# Patient Record
Sex: MALE | Race: WHITE | Employment: FULL TIME | URBAN - METROPOLITAN AREA
[De-identification: names, ages, dates, MRNs, and addresses within clinical notes are randomized per-mention and may not be internally consistent; named-entity substitution may affect disease eponyms.]

---

## 2017-07-17 ENCOUNTER — OFFICE VISIT (OUTPATIENT)
Dept: FAMILY MEDICINE CLINIC | Age: 27
End: 2017-07-17

## 2017-07-17 VITALS
WEIGHT: 209 LBS | RESPIRATION RATE: 18 BRPM | SYSTOLIC BLOOD PRESSURE: 132 MMHG | TEMPERATURE: 98.1 F | DIASTOLIC BLOOD PRESSURE: 84 MMHG | HEIGHT: 66 IN | BODY MASS INDEX: 33.59 KG/M2 | OXYGEN SATURATION: 98 % | HEART RATE: 84 BPM

## 2017-07-17 DIAGNOSIS — F41.9 ANXIETY: Primary | ICD-10-CM

## 2017-07-17 PROCEDURE — 99213 OFFICE O/P EST LOW 20 MIN: CPT | Performed by: FAMILY MEDICINE

## 2017-07-17 RX ORDER — SERTRALINE HYDROCHLORIDE 100 MG/1
200 TABLET, FILM COATED ORAL DAILY
Qty: 180 TABLET | Refills: 3 | Status: SHIPPED | OUTPATIENT
Start: 2017-07-17 | End: 2018-02-17 | Stop reason: CLARIF

## 2017-07-17 ASSESSMENT — ENCOUNTER SYMPTOMS
ALLERGIC/IMMUNOLOGIC NEGATIVE: 1
GASTROINTESTINAL NEGATIVE: 1
SHORTNESS OF BREATH: 0
RESPIRATORY NEGATIVE: 1
EYES NEGATIVE: 1

## 2017-10-10 ENCOUNTER — OFFICE VISIT (OUTPATIENT)
Dept: FAMILY MEDICINE CLINIC | Age: 27
End: 2017-10-10

## 2017-10-10 VITALS
DIASTOLIC BLOOD PRESSURE: 88 MMHG | SYSTOLIC BLOOD PRESSURE: 156 MMHG | RESPIRATION RATE: 18 BRPM | TEMPERATURE: 98.1 F | WEIGHT: 223 LBS | BODY MASS INDEX: 35.84 KG/M2 | OXYGEN SATURATION: 97 % | HEART RATE: 84 BPM | HEIGHT: 66 IN

## 2017-10-10 DIAGNOSIS — I10 ESSENTIAL HYPERTENSION: ICD-10-CM

## 2017-10-10 DIAGNOSIS — G43.009 NONINTRACTABLE MIGRAINE, UNSPECIFIED MIGRAINE TYPE: Primary | ICD-10-CM

## 2017-10-10 PROCEDURE — 99213 OFFICE O/P EST LOW 20 MIN: CPT | Performed by: FAMILY MEDICINE

## 2017-10-10 RX ORDER — SUMATRIPTAN 100 MG/1
100 TABLET, FILM COATED ORAL
Qty: 9 TABLET | Refills: 3 | Status: SHIPPED | OUTPATIENT
Start: 2017-10-10 | End: 2018-04-24 | Stop reason: ALTCHOICE

## 2017-10-10 ASSESSMENT — PATIENT HEALTH QUESTIONNAIRE - PHQ9
1. LITTLE INTEREST OR PLEASURE IN DOING THINGS: 0
SUM OF ALL RESPONSES TO PHQ9 QUESTIONS 1 & 2: 0
2. FEELING DOWN, DEPRESSED OR HOPELESS: 0
SUM OF ALL RESPONSES TO PHQ QUESTIONS 1-9: 0

## 2017-10-10 ASSESSMENT — ENCOUNTER SYMPTOMS
SHORTNESS OF BREATH: 0
RESPIRATORY NEGATIVE: 1
EYES NEGATIVE: 1
GASTROINTESTINAL NEGATIVE: 1
ALLERGIC/IMMUNOLOGIC NEGATIVE: 1

## 2017-10-10 NOTE — PROGRESS NOTES
Patient is seen in follow up for   Chief Complaint   Patient presents with    Migraine     chronic but getting worse x 1 mon     HPIhere with chronic migraines worse the last month. bp is also elevated ct negative last month. Past Medical History:   Diagnosis Date    Depression     Essential hypertension 10/10/2017    Jejunal intussusception ([address]     Nonintractable migraine 10/10/2017     Patient Active Problem List    Diagnosis Date Noted    Essential hypertension 10/10/2017    Nonintractable migraine 10/10/2017    Anxiety 07/17/2017     No past surgical history on file. Family History   Problem Relation Age of Onset    High Blood Pressure Father     Mental Illness Father     Other Father      gout     Social History     Social History    Marital status: Single     Spouse name: N/A    Number of children: N/A    Years of education: N/A     Social History Main Topics    Smoking status: Current Every Day Smoker     Packs/day: 0.50     Years: 5.00    Smokeless tobacco: Never Used    Alcohol use No    Drug use: Unknown    Sexual activity: Not Asked     Other Topics Concern    None     Social History Narrative    None     Current Outpatient Prescriptions   Medication Sig Dispense Refill    verapamil (CALAN SR) 180 MG extended release tablet Take 1 tablet by mouth daily 30 tablet 3    SUMAtriptan (IMITREX) 100 MG tablet Take 1 tablet by mouth once as needed for Migraine 9 tablet 3    sertraline (ZOLOFT) 50 MG tablet 50 mg See Admin Instructions Take 3 tablets every morning      sertraline (ZOLOFT) 100 MG tablet Take 2 tablets by mouth daily 180 tablet 3    LORazepam (ATIVAN) 1 MG tablet Take 1 mg by mouth every 6 hours as needed for Anxiety       No current facility-administered medications for this visit.       Current Outpatient Prescriptions on File Prior to Visit   Medication Sig Dispense Refill    sertraline (ZOLOFT) 50 MG tablet 50 mg See Admin Instructions Take 3 tablets every morning  sertraline (ZOLOFT) 100 MG tablet Take 2 tablets by mouth daily 180 tablet 3    LORazepam (ATIVAN) 1 MG tablet Take 1 mg by mouth every 6 hours as needed for Anxiety       No current facility-administered medications on file prior to visit. Allergies   Allergen Reactions    Amoxil [Amoxicillin]      unknow to pt happens happens when he takes it     Health Maintenance   Topic Date Due    HIV screen  06/15/2005    Pneumococcal med risk (1 of 1 - PPSV23) 06/15/2009    Flu vaccine (1) 09/01/2017    DTaP/Tdap/Td vaccine (2 - Td) 01/01/2022       Review of Systems    Review of Systems   Constitutional: Negative for activity change, appetite change, chills, fever and unexpected weight change. HENT: Negative. Eyes: Negative. Respiratory: Negative. Negative for shortness of breath. Cardiovascular: Negative. Negative for chest pain and palpitations. Gastrointestinal: Negative. Endocrine: Negative. Genitourinary: Negative. Musculoskeletal: Negative. Skin: Negative. Allergic/Immunologic: Negative. Neurological: Negative. Hematological: Negative. Psychiatric/Behavioral: Negative. Physical Exam  Vitals:    10/10/17 1415 10/10/17 1424   BP: (!) 156/86 (!) 156/88   Pulse: 84    Resp: 18    Temp: 98.1 °F (36.7 °C)    TempSrc: Tympanic    SpO2: 97%    Weight: 223 lb (101.2 kg)    Height: 5' 6\" (1.676 m)        Physical Exam   Constitutional: He appears well-developed and well-nourished. HENT:   Right Ear: External ear normal.   Left Ear: External ear normal.   Eyes: Conjunctivae are normal. Pupils are equal, round, and reactive to light. Neck: Normal range of motion. Neck supple. No thyromegaly present. Cardiovascular: Normal rate, regular rhythm and normal heart sounds. No murmur heard. Pulmonary/Chest: Effort normal and breath sounds normal.   Abdominal: Soft. Bowel sounds are normal.   Musculoskeletal: Normal range of motion.  He exhibits no edema or tenderness. Lymphadenopathy:     He has no cervical adenopathy. Assessment  1. Nonintractable migraine, unspecified migraine type     2. Essential hypertension       Problem List     Essential hypertension    Relevant Medications    verapamil (CALAN SR) 180 MG extended release tablet    Nonintractable migraine - Primary    Relevant Medications    sertraline (ZOLOFT) 50 MG tablet    sertraline (ZOLOFT) 100 MG tablet    verapamil (CALAN SR) 180 MG extended release tablet    SUMAtriptan (IMITREX) 100 MG tablet          Plan  No orders of the defined types were placed in this encounter. Orders Placed This Encounter   Medications    verapamil (CALAN SR) 180 MG extended release tablet     Sig: Take 1 tablet by mouth daily     Dispense:  30 tablet     Refill:  3    SUMAtriptan (IMITREX) 100 MG tablet     Sig: Take 1 tablet by mouth once as needed for Migraine     Dispense:  9 tablet     Refill:  3     No Follow-up on file.   Tyra Breaux MD

## 2017-11-30 ENCOUNTER — OFFICE VISIT (OUTPATIENT)
Dept: FAMILY MEDICINE CLINIC | Age: 27
End: 2017-11-30

## 2017-11-30 VITALS
RESPIRATION RATE: 18 BRPM | DIASTOLIC BLOOD PRESSURE: 82 MMHG | HEART RATE: 74 BPM | WEIGHT: 227 LBS | TEMPERATURE: 97.2 F | OXYGEN SATURATION: 97 % | BODY MASS INDEX: 36.48 KG/M2 | SYSTOLIC BLOOD PRESSURE: 130 MMHG | HEIGHT: 66 IN

## 2017-11-30 DIAGNOSIS — F41.9 ANXIETY: ICD-10-CM

## 2017-11-30 DIAGNOSIS — I10 ESSENTIAL HYPERTENSION: Primary | ICD-10-CM

## 2017-11-30 PROCEDURE — G8484 FLU IMMUNIZE NO ADMIN: HCPCS | Performed by: FAMILY MEDICINE

## 2017-11-30 PROCEDURE — G8417 CALC BMI ABV UP PARAM F/U: HCPCS | Performed by: FAMILY MEDICINE

## 2017-11-30 PROCEDURE — 4004F PT TOBACCO SCREEN RCVD TLK: CPT | Performed by: FAMILY MEDICINE

## 2017-11-30 PROCEDURE — G8427 DOCREV CUR MEDS BY ELIG CLIN: HCPCS | Performed by: FAMILY MEDICINE

## 2017-11-30 PROCEDURE — 99213 OFFICE O/P EST LOW 20 MIN: CPT | Performed by: FAMILY MEDICINE

## 2017-11-30 RX ORDER — ALPRAZOLAM 0.25 MG/1
0.25 TABLET ORAL 3 TIMES DAILY PRN
Qty: 60 TABLET | Refills: 1 | Status: SHIPPED | OUTPATIENT
Start: 2017-11-30 | End: 2018-03-09 | Stop reason: SDUPTHER

## 2017-11-30 ASSESSMENT — ENCOUNTER SYMPTOMS
ALLERGIC/IMMUNOLOGIC NEGATIVE: 1
EYES NEGATIVE: 1
RESPIRATORY NEGATIVE: 1
SHORTNESS OF BREATH: 0
GASTROINTESTINAL NEGATIVE: 1

## 2018-01-11 ENCOUNTER — OFFICE VISIT (OUTPATIENT)
Dept: FAMILY MEDICINE CLINIC | Age: 28
End: 2018-01-11

## 2018-01-11 VITALS
HEART RATE: 98 BPM | HEIGHT: 66 IN | TEMPERATURE: 98 F | SYSTOLIC BLOOD PRESSURE: 154 MMHG | WEIGHT: 230 LBS | DIASTOLIC BLOOD PRESSURE: 96 MMHG | BODY MASS INDEX: 36.96 KG/M2 | RESPIRATION RATE: 18 BRPM | OXYGEN SATURATION: 97 %

## 2018-01-11 DIAGNOSIS — F41.9 ANXIETY: ICD-10-CM

## 2018-01-11 DIAGNOSIS — I10 ESSENTIAL HYPERTENSION: Primary | ICD-10-CM

## 2018-01-11 PROCEDURE — G8427 DOCREV CUR MEDS BY ELIG CLIN: HCPCS | Performed by: FAMILY MEDICINE

## 2018-01-11 PROCEDURE — G8484 FLU IMMUNIZE NO ADMIN: HCPCS | Performed by: FAMILY MEDICINE

## 2018-01-11 PROCEDURE — 99213 OFFICE O/P EST LOW 20 MIN: CPT | Performed by: FAMILY MEDICINE

## 2018-01-11 PROCEDURE — G8417 CALC BMI ABV UP PARAM F/U: HCPCS | Performed by: FAMILY MEDICINE

## 2018-01-11 PROCEDURE — 4004F PT TOBACCO SCREEN RCVD TLK: CPT | Performed by: FAMILY MEDICINE

## 2018-01-11 RX ORDER — QUETIAPINE FUMARATE 100 MG/1
100 TABLET, FILM COATED ORAL NIGHTLY
Qty: 60 TABLET | Refills: 3 | Status: SHIPPED | OUTPATIENT
Start: 2018-01-11 | End: 2018-02-17 | Stop reason: CLARIF

## 2018-01-11 ASSESSMENT — ENCOUNTER SYMPTOMS
RESPIRATORY NEGATIVE: 1
SHORTNESS OF BREATH: 0
ALLERGIC/IMMUNOLOGIC NEGATIVE: 1
EYES NEGATIVE: 1
GASTROINTESTINAL NEGATIVE: 1

## 2018-01-11 NOTE — PROGRESS NOTES
Bowel sounds are normal.   Musculoskeletal: Normal range of motion. He exhibits no edema or tenderness. Lymphadenopathy:     He has no cervical adenopathy. Assessment  1. Essential hypertension     2. Anxiety       Problem List     Anxiety    Relevant Medications    LORazepam (ATIVAN) injection 0.5 mg (Completed)    sertraline (ZOLOFT) 100 MG tablet    Essential hypertension - Primary    Relevant Medications    verapamil (CALAN SR) 180 MG extended release tablet          Plan  No orders of the defined types were placed in this encounter. Orders Placed This Encounter   Medications    verapamil (CALAN SR) 180 MG extended release tablet     Sig: Take 1 tablet by mouth 2 times daily     Dispense:  60 tablet     Refill:  3    QUEtiapine (SEROQUEL) 100 MG tablet     Sig: Take 1 tablet by mouth nightly     Dispense:  60 tablet     Refill:  3     No Follow-up on file.   Jeaneth Wynn MD

## 2018-01-27 ENCOUNTER — OFFICE VISIT (OUTPATIENT)
Dept: FAMILY MEDICINE CLINIC | Age: 28
End: 2018-01-27
Payer: COMMERCIAL

## 2018-01-27 VITALS
HEART RATE: 70 BPM | WEIGHT: 237 LBS | DIASTOLIC BLOOD PRESSURE: 110 MMHG | TEMPERATURE: 98.1 F | SYSTOLIC BLOOD PRESSURE: 160 MMHG | HEIGHT: 66 IN | BODY MASS INDEX: 38.09 KG/M2 | RESPIRATION RATE: 16 BRPM

## 2018-01-27 DIAGNOSIS — G43.909 MIGRAINE WITHOUT STATUS MIGRAINOSUS, NOT INTRACTABLE, UNSPECIFIED MIGRAINE TYPE: ICD-10-CM

## 2018-01-27 DIAGNOSIS — I10 ESSENTIAL HYPERTENSION: Primary | ICD-10-CM

## 2018-01-27 PROCEDURE — G8417 CALC BMI ABV UP PARAM F/U: HCPCS | Performed by: FAMILY MEDICINE

## 2018-01-27 PROCEDURE — 99213 OFFICE O/P EST LOW 20 MIN: CPT | Performed by: FAMILY MEDICINE

## 2018-01-27 PROCEDURE — 4004F PT TOBACCO SCREEN RCVD TLK: CPT | Performed by: FAMILY MEDICINE

## 2018-01-27 PROCEDURE — G8484 FLU IMMUNIZE NO ADMIN: HCPCS | Performed by: FAMILY MEDICINE

## 2018-01-27 PROCEDURE — G8427 DOCREV CUR MEDS BY ELIG CLIN: HCPCS | Performed by: FAMILY MEDICINE

## 2018-01-27 RX ORDER — VERAPAMIL HYDROCHLORIDE 240 MG/1
240 TABLET, FILM COATED, EXTENDED RELEASE ORAL DAILY
Qty: 90 TABLET | Refills: 3 | Status: SHIPPED | OUTPATIENT
Start: 2018-01-27 | End: 2018-09-25 | Stop reason: SDUPTHER

## 2018-01-27 RX ORDER — BLOOD PRESSURE TEST KIT-LARGE
1 KIT MISCELLANEOUS DAILY
Qty: 1 DEVICE | Refills: 0 | Status: SHIPPED | OUTPATIENT
Start: 2018-01-27 | End: 2018-09-25

## 2018-01-27 ASSESSMENT — ENCOUNTER SYMPTOMS
SHORTNESS OF BREATH: 0
RESPIRATORY NEGATIVE: 1
EYES NEGATIVE: 1
ALLERGIC/IMMUNOLOGIC NEGATIVE: 1
GASTROINTESTINAL NEGATIVE: 1

## 2018-01-27 NOTE — PROGRESS NOTES
Patient is seen in follow up for   Chief Complaint   Patient presents with    Medication Check     bp     HPI here for follow up on htn he had some nausea with the calan. Past Medical History:   Diagnosis Date    Depression     Essential hypertension 10/10/2017    Jejunal intussusception ([address]     Nonintractable migraine 10/10/2017     Patient Active Problem List    Diagnosis Date Noted    Essential hypertension 10/10/2017    Nonintractable migraine 10/10/2017    Anxiety 07/17/2017     No past surgical history on file. Family History   Problem Relation Age of Onset    High Blood Pressure Father     Mental Illness Father     Other Father      gout     Social History     Social History    Marital status: Single     Spouse name: N/A    Number of children: N/A    Years of education: N/A     Social History Main Topics    Smoking status: Current Every Day Smoker     Packs/day: 0.50     Years: 5.00    Smokeless tobacco: Never Used    Alcohol use No    Drug use: Unknown    Sexual activity: Not Asked     Other Topics Concern    None     Social History Narrative    None     Current Outpatient Prescriptions   Medication Sig Dispense Refill    verapamil (CALAN SR) 240 MG extended release tablet Take 1 tablet by mouth daily 90 tablet 3    Blood Pressure Monitoring (5 SERIES BP MONITOR) NESTOR 1 Device by Does not apply route Daily 1 Device 0    QUEtiapine (SEROQUEL) 100 MG tablet Take 1 tablet by mouth nightly 60 tablet 3    SUMAtriptan (IMITREX) 100 MG tablet Take 1 tablet by mouth once as needed for Migraine 9 tablet 3    sertraline (ZOLOFT) 100 MG tablet Take 2 tablets by mouth daily 180 tablet 3     No current facility-administered medications for this visit.       Current Outpatient Prescriptions on File Prior to Visit   Medication Sig Dispense Refill    QUEtiapine (SEROQUEL) 100 MG tablet Take 1 tablet by mouth nightly 60 tablet 3    SUMAtriptan (IMITREX) 100 MG tablet Take 1 tablet by mouth

## 2018-02-17 ENCOUNTER — OFFICE VISIT (OUTPATIENT)
Dept: FAMILY MEDICINE CLINIC | Age: 28
End: 2018-02-17
Payer: COMMERCIAL

## 2018-02-17 VITALS
WEIGHT: 240 LBS | BODY MASS INDEX: 38.57 KG/M2 | RESPIRATION RATE: 15 BRPM | DIASTOLIC BLOOD PRESSURE: 84 MMHG | HEART RATE: 86 BPM | HEIGHT: 66 IN | SYSTOLIC BLOOD PRESSURE: 136 MMHG | TEMPERATURE: 98.1 F

## 2018-02-17 DIAGNOSIS — I10 ESSENTIAL HYPERTENSION: Primary | ICD-10-CM

## 2018-02-17 DIAGNOSIS — F41.9 ANXIETY: ICD-10-CM

## 2018-02-17 PROCEDURE — G8417 CALC BMI ABV UP PARAM F/U: HCPCS | Performed by: FAMILY MEDICINE

## 2018-02-17 PROCEDURE — 99213 OFFICE O/P EST LOW 20 MIN: CPT | Performed by: FAMILY MEDICINE

## 2018-02-17 PROCEDURE — 4004F PT TOBACCO SCREEN RCVD TLK: CPT | Performed by: FAMILY MEDICINE

## 2018-02-17 PROCEDURE — G8427 DOCREV CUR MEDS BY ELIG CLIN: HCPCS | Performed by: FAMILY MEDICINE

## 2018-02-17 PROCEDURE — G8484 FLU IMMUNIZE NO ADMIN: HCPCS | Performed by: FAMILY MEDICINE

## 2018-02-17 RX ORDER — QUETIAPINE FUMARATE 25 MG/1
25 TABLET, FILM COATED ORAL NIGHTLY
Qty: 60 TABLET | Refills: 3 | Status: SHIPPED | OUTPATIENT
Start: 2018-02-17 | End: 2018-09-25

## 2018-02-17 ASSESSMENT — ENCOUNTER SYMPTOMS
SHORTNESS OF BREATH: 0
ALLERGIC/IMMUNOLOGIC NEGATIVE: 1
RESPIRATORY NEGATIVE: 1
EYES NEGATIVE: 1
GASTROINTESTINAL NEGATIVE: 1

## 2018-02-17 NOTE — PROGRESS NOTES
Patient is seen in follow up for   Chief Complaint   Patient presents with    Hypertension     HPIhere for follow up on anxiety to sedated with the seroquel    Past Medical History:   Diagnosis Date    Depression     Essential hypertension 10/10/2017    Jejunal intussusception ([address]     Nonintractable migraine 10/10/2017     Patient Active Problem List    Diagnosis Date Noted    Essential hypertension 10/10/2017    Nonintractable migraine 10/10/2017    Anxiety 07/17/2017     No past surgical history on file. Family History   Problem Relation Age of Onset    High Blood Pressure Father     Mental Illness Father     Other Father      gout     Social History     Social History    Marital status: Single     Spouse name: N/A    Number of children: N/A    Years of education: N/A     Social History Main Topics    Smoking status: Current Every Day Smoker     Packs/day: 0.50     Years: 5.00    Smokeless tobacco: Never Used    Alcohol use No    Drug use: Unknown    Sexual activity: Not Asked     Other Topics Concern    None     Social History Narrative    None     Current Outpatient Prescriptions   Medication Sig Dispense Refill    QUEtiapine (SEROQUEL) 25 MG tablet Take 1 tablet by mouth nightly 60 tablet 3    verapamil (CALAN SR) 240 MG extended release tablet Take 1 tablet by mouth daily 90 tablet 3    Blood Pressure Monitoring (5 SERIES BP MONITOR) NESTOR 1 Device by Does not apply route Daily 1 Device 0    SUMAtriptan (IMITREX) 100 MG tablet Take 1 tablet by mouth once as needed for Migraine 9 tablet 3     No current facility-administered medications for this visit.       Current Outpatient Prescriptions on File Prior to Visit   Medication Sig Dispense Refill    verapamil (CALAN SR) 240 MG extended release tablet Take 1 tablet by mouth daily 90 tablet 3    Blood Pressure Monitoring (5 SERIES BP MONITOR) NESTOR 1 Device by Does not apply route Daily 1 Device 0    SUMAtriptan (IMITREX) 100 MG tablet He has no cervical adenopathy. Assessment  1. Essential hypertension     2. Anxiety       Problem List     Anxiety    Relevant Medications    LORazepam (ATIVAN) injection 0.5 mg (Completed)    Essential hypertension - Primary    Relevant Medications    verapamil (CALAN SR) 240 MG extended release tablet          Plan  No orders of the defined types were placed in this encounter.     Orders Placed This Encounter   Medications    QUEtiapine (SEROQUEL) 25 MG tablet     Sig: Take 1 tablet by mouth nightly     Dispense:  60 tablet     Refill:  3   follow up in three weeks will wean off zoloft and follow up and may add wellbutrin  Saleem Slade MD

## 2018-03-09 ENCOUNTER — OFFICE VISIT (OUTPATIENT)
Dept: FAMILY MEDICINE CLINIC | Age: 28
End: 2018-03-09
Payer: COMMERCIAL

## 2018-03-09 VITALS
HEIGHT: 66 IN | BODY MASS INDEX: 38.57 KG/M2 | SYSTOLIC BLOOD PRESSURE: 130 MMHG | HEART RATE: 74 BPM | OXYGEN SATURATION: 97 % | RESPIRATION RATE: 18 BRPM | DIASTOLIC BLOOD PRESSURE: 88 MMHG | WEIGHT: 240 LBS | TEMPERATURE: 98.2 F

## 2018-03-09 DIAGNOSIS — G43.909 MIGRAINE WITHOUT STATUS MIGRAINOSUS, NOT INTRACTABLE, UNSPECIFIED MIGRAINE TYPE: ICD-10-CM

## 2018-03-09 DIAGNOSIS — I10 ESSENTIAL HYPERTENSION: Primary | ICD-10-CM

## 2018-03-09 DIAGNOSIS — F41.9 ANXIETY: ICD-10-CM

## 2018-03-09 PROCEDURE — G8417 CALC BMI ABV UP PARAM F/U: HCPCS | Performed by: FAMILY MEDICINE

## 2018-03-09 PROCEDURE — G8484 FLU IMMUNIZE NO ADMIN: HCPCS | Performed by: FAMILY MEDICINE

## 2018-03-09 PROCEDURE — G8427 DOCREV CUR MEDS BY ELIG CLIN: HCPCS | Performed by: FAMILY MEDICINE

## 2018-03-09 PROCEDURE — 99213 OFFICE O/P EST LOW 20 MIN: CPT | Performed by: FAMILY MEDICINE

## 2018-03-09 PROCEDURE — 4004F PT TOBACCO SCREEN RCVD TLK: CPT | Performed by: FAMILY MEDICINE

## 2018-03-09 RX ORDER — ALPRAZOLAM 0.25 MG/1
0.25 TABLET ORAL 3 TIMES DAILY PRN
Qty: 60 TABLET | Refills: 2 | Status: SHIPPED | OUTPATIENT
Start: 2018-03-09 | End: 2018-09-25 | Stop reason: SDUPTHER

## 2018-03-09 NOTE — PROGRESS NOTES
Patient is seen in follow up for   Chief Complaint   Patient presents with    Hypertension     3 mon f/u      Hypertension   This is a new problem. The current episode started more than 1 month ago. The problem has been gradually improving since onset. The problem is controlled. Pertinent negatives include no chest pain, palpitations or shortness of breath. There are no associated agents to hypertension. There are no known risk factors for coronary artery disease. Past treatments include calcium channel blockers. The current treatment provides moderate improvement. Past Medical History:   Diagnosis Date    Depression     Essential hypertension 10/10/2017    Jejunal intussusception ([address]     Nonintractable migraine 10/10/2017     Patient Active Problem List    Diagnosis Date Noted    Essential hypertension 10/10/2017    Nonintractable migraine 10/10/2017    Anxiety 07/17/2017     No past surgical history on file. Family History   Problem Relation Age of Onset    High Blood Pressure Father     Mental Illness Father     Other Father      gout     Social History     Social History    Marital status: Single     Spouse name: N/A    Number of children: N/A    Years of education: N/A     Social History Main Topics    Smoking status: Current Every Day Smoker     Packs/day: 0.50     Years: 5.00    Smokeless tobacco: Never Used    Alcohol use No    Drug use: Unknown    Sexual activity: Not Asked     Other Topics Concern    None     Social History Narrative    None     Current Outpatient Prescriptions   Medication Sig Dispense Refill    ALPRAZolam (XANAX) 0.25 MG tablet Take 1 tablet by mouth 3 times daily as needed for Anxiety for up to 30 days.  60 tablet 2    QUEtiapine (SEROQUEL) 25 MG tablet Take 1 tablet by mouth nightly 60 tablet 3    verapamil (CALAN SR) 240 MG extended release tablet Take 1 tablet by mouth daily 90 tablet 3    Blood Pressure Monitoring (5 SERIES BP MONITOR) NESTOR 1 Device

## 2018-04-24 ENCOUNTER — OFFICE VISIT (OUTPATIENT)
Dept: INTERNAL MEDICINE CLINIC | Age: 28
End: 2018-04-24
Payer: COMMERCIAL

## 2018-04-24 VITALS
WEIGHT: 252 LBS | HEART RATE: 103 BPM | RESPIRATION RATE: 18 BRPM | BODY MASS INDEX: 40.5 KG/M2 | OXYGEN SATURATION: 98 % | HEIGHT: 66 IN | TEMPERATURE: 98 F | SYSTOLIC BLOOD PRESSURE: 118 MMHG | DIASTOLIC BLOOD PRESSURE: 78 MMHG

## 2018-04-24 DIAGNOSIS — I10 ESSENTIAL HYPERTENSION: Primary | ICD-10-CM

## 2018-04-24 DIAGNOSIS — F41.9 ANXIETY: ICD-10-CM

## 2018-04-24 DIAGNOSIS — G43.909 MIGRAINE WITHOUT STATUS MIGRAINOSUS, NOT INTRACTABLE, UNSPECIFIED MIGRAINE TYPE: ICD-10-CM

## 2018-04-24 PROCEDURE — G8417 CALC BMI ABV UP PARAM F/U: HCPCS | Performed by: FAMILY MEDICINE

## 2018-04-24 PROCEDURE — G8427 DOCREV CUR MEDS BY ELIG CLIN: HCPCS | Performed by: FAMILY MEDICINE

## 2018-04-24 PROCEDURE — 99213 OFFICE O/P EST LOW 20 MIN: CPT | Performed by: FAMILY MEDICINE

## 2018-04-24 PROCEDURE — 1036F TOBACCO NON-USER: CPT | Performed by: FAMILY MEDICINE

## 2018-04-24 ASSESSMENT — ENCOUNTER SYMPTOMS
SHORTNESS OF BREATH: 0
ALLERGIC/IMMUNOLOGIC NEGATIVE: 1
GASTROINTESTINAL NEGATIVE: 1
EYES NEGATIVE: 1
RESPIRATORY NEGATIVE: 1

## 2018-09-25 ENCOUNTER — OFFICE VISIT (OUTPATIENT)
Dept: FAMILY MEDICINE CLINIC | Age: 28
End: 2018-09-25
Payer: COMMERCIAL

## 2018-09-25 VITALS
SYSTOLIC BLOOD PRESSURE: 122 MMHG | WEIGHT: 224.8 LBS | BODY MASS INDEX: 36.13 KG/M2 | TEMPERATURE: 99.1 F | HEART RATE: 84 BPM | OXYGEN SATURATION: 99 % | HEIGHT: 66 IN | DIASTOLIC BLOOD PRESSURE: 92 MMHG | RESPIRATION RATE: 14 BRPM

## 2018-09-25 DIAGNOSIS — F41.9 ANXIETY: ICD-10-CM

## 2018-09-25 DIAGNOSIS — F32.A DEPRESSION, UNSPECIFIED DEPRESSION TYPE: ICD-10-CM

## 2018-09-25 DIAGNOSIS — I10 ESSENTIAL HYPERTENSION: Primary | ICD-10-CM

## 2018-09-25 DIAGNOSIS — Z13.31 POSITIVE DEPRESSION SCREENING: ICD-10-CM

## 2018-09-25 PROCEDURE — G0444 DEPRESSION SCREEN ANNUAL: HCPCS | Performed by: FAMILY MEDICINE

## 2018-09-25 PROCEDURE — 99213 OFFICE O/P EST LOW 20 MIN: CPT | Performed by: FAMILY MEDICINE

## 2018-09-25 PROCEDURE — G8431 POS CLIN DEPRES SCRN F/U DOC: HCPCS | Performed by: FAMILY MEDICINE

## 2018-09-25 PROCEDURE — 4004F PT TOBACCO SCREEN RCVD TLK: CPT | Performed by: FAMILY MEDICINE

## 2018-09-25 PROCEDURE — G8427 DOCREV CUR MEDS BY ELIG CLIN: HCPCS | Performed by: FAMILY MEDICINE

## 2018-09-25 PROCEDURE — G8417 CALC BMI ABV UP PARAM F/U: HCPCS | Performed by: FAMILY MEDICINE

## 2018-09-25 RX ORDER — ALPRAZOLAM 0.25 MG/1
0.25 TABLET ORAL 2 TIMES DAILY PRN
Qty: 30 TABLET | Refills: 1 | Status: SHIPPED | OUTPATIENT
Start: 2018-09-25 | End: 2018-11-28 | Stop reason: SDUPTHER

## 2018-09-25 RX ORDER — VERAPAMIL HYDROCHLORIDE 240 MG/1
240 TABLET, FILM COATED, EXTENDED RELEASE ORAL DAILY
Qty: 90 TABLET | Refills: 1 | Status: SHIPPED | OUTPATIENT
Start: 2018-09-25 | End: 2019-04-16 | Stop reason: DRUGHIGH

## 2018-09-25 ASSESSMENT — PATIENT HEALTH QUESTIONNAIRE - PHQ9
2. FEELING DOWN, DEPRESSED OR HOPELESS: 2
10. IF YOU CHECKED OFF ANY PROBLEMS, HOW DIFFICULT HAVE THESE PROBLEMS MADE IT FOR YOU TO DO YOUR WORK, TAKE CARE OF THINGS AT HOME, OR GET ALONG WITH OTHER PEOPLE: 3
SUM OF ALL RESPONSES TO PHQ QUESTIONS 1-9: 18
SUM OF ALL RESPONSES TO PHQ9 QUESTIONS 1 & 2: 5
1. LITTLE INTEREST OR PLEASURE IN DOING THINGS: 3
7. TROUBLE CONCENTRATING ON THINGS, SUCH AS READING THE NEWSPAPER OR WATCHING TELEVISION: 0
8. MOVING OR SPEAKING SO SLOWLY THAT OTHER PEOPLE COULD HAVE NOTICED. OR THE OPPOSITE, BEING SO FIGETY OR RESTLESS THAT YOU HAVE BEEN MOVING AROUND A LOT MORE THAN USUAL: 0
3. TROUBLE FALLING OR STAYING ASLEEP: 3
5. POOR APPETITE OR OVEREATING: 3
4. FEELING TIRED OR HAVING LITTLE ENERGY: 3
6. FEELING BAD ABOUT YOURSELF - OR THAT YOU ARE A FAILURE OR HAVE LET YOURSELF OR YOUR FAMILY DOWN: 3
SUM OF ALL RESPONSES TO PHQ QUESTIONS 1-9: 18
9. THOUGHTS THAT YOU WOULD BE BETTER OFF DEAD, OR OF HURTING YOURSELF: 1

## 2018-09-25 NOTE — PROGRESS NOTES
Subjective  Eris Patel, 29 y.o. male presents today with:  Chief Complaint   Patient presents with    Depression     getting worse; previous doctor was Dr Rick Key as PCP and Pysch but stopped going (pt has seen 4 different pysch)     Anxiety     getting worse     Hypertension    Medication Refill     No hi/si  reqs genesight  Is depressed  Wants to wait  Hx ssris not liking them-zoloft stopped working    Has seen psychiatry        Past Medical History:   Diagnosis Date    Depression     Essential hypertension 10/10/2017    Jejunal intussusception ([address]     Nonintractable migraine 10/10/2017     No past surgical history on file. Social History     Social History    Marital status: Single     Spouse name: N/A    Number of children: N/A    Years of education: N/A     Occupational History    Not on file. Social History Main Topics    Smoking status: Current Every Day Smoker     Packs/day: 0.50     Years: 5.00     Last attempt to quit: 12/20/2017    Smokeless tobacco: Never Used    Alcohol use No    Drug use: Unknown    Sexual activity: Not on file     Other Topics Concern    Not on file     Social History Narrative    No narrative on file     Family History   Problem Relation Age of Onset    High Blood Pressure Father     Mental Illness Father     Other Father         gout     Allergies   Allergen Reactions    Amoxil [Amoxicillin]      unknow to pt happens happens when he takes it     Current Outpatient Prescriptions   Medication Sig Dispense Refill    verapamil (CALAN SR) 240 MG extended release tablet Take 1 tablet by mouth daily 90 tablet 3     No current facility-administered medications for this visit. The patient denies any history of      seizures,             heart attack or KNOWN CAD        or stroke. No chest pain, shortness of breath, paroxysmal nocturnal dyspnea. No nausea, vomiting, diarrhea, hematochezia or melena. No paresthesias or headaches.      No rebound. Normo active bowel sounds. EXTREMITIES:  No edema in any extremity. No cyanosis or clubbing. 2+ dorsalis pedis pulses bilaterally      Has 2 boys w him today  Admits overwhelmed at times occ panic-not lately      Assessment & Plan    Diagnosis Orders   1. Essential hypertension  verapamil (CALAN SR) 240 MG extended release tablet   2. Anxiety  ALPRAZolam (XANAX) 0.25 MG tablet   3. Depression, unspecified depression type       No orders of the defined types were placed in this encounter. No orders of the defined types were placed in this encounter. Medications Discontinued During This Encounter   Medication Reason    QUEtiapine (SEROQUEL) 25 MG tablet     Blood Pressure Monitoring (5 SERIES BP MONITOR) NESTOR      No Follow-up on file. No hi/si  Uses xanax sparingly  In  w jaqueline  Consider perhaps viibryd  Discussed risks, benefits, and alternatives of this medicine and advised to call and discontinue if concerning side effects. Good support system    Park Tompkins MD      On the basis of positive PHQ-9 screening (PHQ-9 Total Score: 18), the following plan was implemented: referral for psychotherapy provided to address external stressors. Patient will follow-up in 12 week(s) with PCP.

## 2018-09-25 NOTE — PATIENT INSTRUCTIONS
social groups, or volunteer to help others. Being alone sometimes makes things seem worse than they are. · Get at least 30 minutes of exercise on most days of the week to relieve stress. Walking is a good choice. You also may want to do other activities, such as running, swimming, cycling, or playing tennis or team sports. Relaxation techniques  Do relaxation exercises 10 to 20 minutes a day. You can play soothing, relaxing music while you do them, if you wish. · Tell others in your house that you are going to do your relaxation exercises. Ask them not to disturb you. · Find a comfortable place, away from all distractions and noise. · Lie down on your back, or sit with your back straight. · Focus on your breathing. Make it slow and steady. · Breathe in through your nose. Breathe out through either your nose or mouth. · Breathe deeply, filling up the area between your navel and your rib cage. Breathe so that your belly goes up and down. · Do not hold your breath. · Breathe like this for 5 to 10 minutes. Notice the feeling of calmness throughout your whole body. As you continue to breathe slowly and deeply, relax by doing the following for another 5 to 10 minutes:  · Tighten and relax each muscle group in your body. You can begin at your toes and work your way up to your head. · Imagine your muscle groups relaxing and becoming heavy. · Empty your mind of all thoughts. · Let yourself relax more and more deeply. · Become aware of the state of calmness that surrounds you. · When your relaxation time is over, you can bring yourself back to alertness by moving your fingers and toes and then your hands and feet and then stretching and moving your entire body. Sometimes people fall asleep during relaxation, but they usually wake up shortly afterward. · Always give yourself time to return to full alertness before you drive a car or do anything that might cause an accident if you are not fully alert.  Never exercises. Ask them not to disturb you. · Find a comfortable place, away from all distractions and noise. · Lie down on your back, or sit with your back straight. · Focus on your breathing. Make it slow and steady. · Breathe in through your nose. Breathe out through either your nose or mouth. · Breathe deeply, filling up the area between your navel and your rib cage. Breathe so that your belly goes up and down. · Do not hold your breath. · Breathe like this for 5 to 10 minutes. Notice the feeling of calmness throughout your whole body. As you continue to breathe slowly and deeply, relax by doing the following for another 5 to 10 minutes:  · Tighten and relax each muscle group in your body. You can begin at your toes and work your way up to your head. · Imagine your muscle groups relaxing and becoming heavy. · Empty your mind of all thoughts. · Let yourself relax more and more deeply. · Become aware of the state of calmness that surrounds you. · When your relaxation time is over, you can bring yourself back to alertness by moving your fingers and toes and then your hands and feet and then stretching and moving your entire body. Sometimes people fall asleep during relaxation, but they usually wake up shortly afterward. · Always give yourself time to return to full alertness before you drive a car or do anything that might cause an accident if you are not fully alert. Never play a relaxation tape while driving a car. When should you call for help? Call 911 anytime you think you may need emergency care. For example, call if:    · You feel you cannot stop from hurting yourself or someone else.    Watch closely for changes in your health, and be sure to contact your doctor if:    · Your panic attacks get worse.     · You have new or different anxiety.     · You are not getting better as expected. Where can you learn more? Go to https://[employer]. org and sign in to your [employer] account.

## 2018-10-31 ENCOUNTER — OFFICE VISIT (OUTPATIENT)
Dept: FAMILY MEDICINE CLINIC | Age: 28
End: 2018-10-31
Payer: COMMERCIAL

## 2018-10-31 VITALS
HEART RATE: 92 BPM | OXYGEN SATURATION: 98 % | BODY MASS INDEX: 36.32 KG/M2 | SYSTOLIC BLOOD PRESSURE: 122 MMHG | RESPIRATION RATE: 15 BRPM | HEIGHT: 66 IN | WEIGHT: 226 LBS | TEMPERATURE: 97.6 F | DIASTOLIC BLOOD PRESSURE: 88 MMHG

## 2018-10-31 DIAGNOSIS — N63.20 LEFT BREAST MASS: Primary | ICD-10-CM

## 2018-10-31 PROCEDURE — 99213 OFFICE O/P EST LOW 20 MIN: CPT | Performed by: INTERNAL MEDICINE

## 2018-10-31 PROCEDURE — G8417 CALC BMI ABV UP PARAM F/U: HCPCS | Performed by: INTERNAL MEDICINE

## 2018-10-31 PROCEDURE — G8484 FLU IMMUNIZE NO ADMIN: HCPCS | Performed by: INTERNAL MEDICINE

## 2018-10-31 PROCEDURE — G8427 DOCREV CUR MEDS BY ELIG CLIN: HCPCS | Performed by: INTERNAL MEDICINE

## 2018-10-31 PROCEDURE — 4004F PT TOBACCO SCREEN RCVD TLK: CPT | Performed by: INTERNAL MEDICINE

## 2018-10-31 ASSESSMENT — ENCOUNTER SYMPTOMS
BACK PAIN: 0
SHORTNESS OF BREATH: 0
ABDOMINAL PAIN: 0
EYE PAIN: 0

## 2018-10-31 NOTE — PROGRESS NOTES
Negative for chest pain. Gastrointestinal: Negative for abdominal pain. Genitourinary: Negative for hematuria. Musculoskeletal: Negative for back pain. Skin:        \"bump\" by left breast   Allergic/Immunologic: Negative for immunocompromised state. Neurological: Negative for headaches. Psychiatric/Behavioral: Negative for hallucinations. Objective:   /88 (Site: Right Upper Arm, Position: Sitting, Cuff Size: Large Adult)   Pulse 92   Temp 97.6 °F (36.4 °C) (Tympanic)   Resp 15   Ht 5' 6\" (1.676 m)   Wt 226 lb (102.5 kg)   SpO2 98%   BMI 36.48 kg/m²     Physical Exam   Constitutional: He is oriented to person, place, and time. He appears well-developed and well-nourished. Mobile subcutaneous nodule deep to left areola  No discharge  No fluctuance  No erythema   HENT:   Head: Normocephalic. Eyes: Pupils are equal, round, and reactive to light. Neck: No tracheal deviation present. Cardiovascular: Normal rate, regular rhythm and normal heart sounds. Exam reveals no gallop and no friction rub. No murmur heard. Pulmonary/Chest: No respiratory distress. Abdominal: Soft. Bowel sounds are normal. He exhibits no distension. There is no rebound. Musculoskeletal: He exhibits no edema. Neurological: He is oriented to person, place, and time. Skin: Skin is warm and dry. Assessment:       Diagnosis Orders   1.  Left breast mass  [address], [place] [facility]         Plan:   Most likely lipoma  Get US given FH though  Referral to surgery for removal as well given discomfort with touch, etc.   Call asap if any s/s of infection, getting larger, etc.   Orders Placed This Encounter   Procedures    US BREASt COMPLETE LEFT     Standing Status:   Future     Standing Expiration Date:   10/31/2019     Order Specific Question:   Reason for exam:     Answer:   soft tissue mass under left areola   [id number] [facility],

## 2018-11-05 ENCOUNTER — OFFICE VISIT (OUTPATIENT)
Dept: SURGERY | Age: 28
End: 2018-11-05
Payer: COMMERCIAL

## 2018-11-05 VITALS
WEIGHT: 225 LBS | BODY MASS INDEX: 36.16 KG/M2 | DIASTOLIC BLOOD PRESSURE: 60 MMHG | HEIGHT: 66 IN | SYSTOLIC BLOOD PRESSURE: 124 MMHG | TEMPERATURE: 97.9 F

## 2018-11-05 DIAGNOSIS — N63.42 SUBAREOLAR MASS OF LEFT BREAST: ICD-10-CM

## 2018-11-05 PROCEDURE — G8484 FLU IMMUNIZE NO ADMIN: HCPCS | Performed by: SURGERY

## 2018-11-05 PROCEDURE — 4004F PT TOBACCO SCREEN RCVD TLK: CPT | Performed by: SURGERY

## 2018-11-05 PROCEDURE — G8417 CALC BMI ABV UP PARAM F/U: HCPCS | Performed by: SURGERY

## 2018-11-05 PROCEDURE — 99201 PR OFFICE OUTPATIENT NEW 10 MINUTES: CPT | Performed by: SURGERY

## 2018-11-05 PROCEDURE — G8427 DOCREV CUR MEDS BY ELIG CLIN: HCPCS | Performed by: SURGERY

## 2018-11-12 DIAGNOSIS — N63.20 LEFT BREAST MASS: Primary | ICD-10-CM

## 2018-11-28 ENCOUNTER — CLINICAL DOCUMENTATION (OUTPATIENT)
Dept: FAMILY MEDICINE CLINIC | Age: 28
End: 2018-11-28

## 2018-11-28 DIAGNOSIS — F41.9 ANXIETY: ICD-10-CM

## 2018-11-28 RX ORDER — ALPRAZOLAM 0.25 MG/1
0.25 TABLET ORAL 2 TIMES DAILY PRN
Qty: 30 TABLET | Refills: 0 | Status: SHIPPED | OUTPATIENT
Start: 2018-11-28 | End: 2018-12-28

## 2018-12-25 ENCOUNTER — HOSPITAL ENCOUNTER (EMERGENCY)
Age: 28
Discharge: HOME | End: 2018-12-26
Payer: COMMERCIAL

## 2018-12-25 ENCOUNTER — APPOINTMENT (OUTPATIENT)
Dept: GENERAL RADIOLOGY | Age: 28
End: 2018-12-25
Payer: COMMERCIAL

## 2018-12-25 VITALS
DIASTOLIC BLOOD PRESSURE: 68 MMHG | RESPIRATION RATE: 18 BRPM | HEART RATE: 96 BPM | SYSTOLIC BLOOD PRESSURE: 108 MMHG | TEMPERATURE: 99.1 F | WEIGHT: 240 LBS | BODY MASS INDEX: 38.74 KG/M2 | OXYGEN SATURATION: 95 %

## 2018-12-25 DIAGNOSIS — B34.9 VIRAL ILLNESS: Primary | ICD-10-CM

## 2018-12-25 DIAGNOSIS — R19.7 NAUSEA VOMITING AND DIARRHEA: ICD-10-CM

## 2018-12-25 DIAGNOSIS — R11.2 NAUSEA VOMITING AND DIARRHEA: ICD-10-CM

## 2018-12-25 LAB
ANION GAP SERPL CALCULATED.3IONS-SCNC: 15 MEQ/L (ref 7–13)
BASOPHILS ABSOLUTE: 0.1 K/UL (ref 0–0.2)
BASOPHILS RELATIVE PERCENT: 0.6 %
BUN BLDV-MCNC: 15 MG/DL (ref 6–20)
CALCIUM SERPL-MCNC: 8.9 MG/DL (ref 8.6–10.2)
CHLORIDE BLD-SCNC: 100 MEQ/L (ref 98–107)
CO2: 23 MEQ/L (ref 22–29)
CREAT SERPL-MCNC: 0.92 MG/DL (ref 0.7–1.2)
EOSINOPHILS ABSOLUTE: 0.1 K/UL (ref 0–0.7)
EOSINOPHILS RELATIVE PERCENT: 0.5 %
GFR AFRICAN AMERICAN: >60
GFR NON-AFRICAN AMERICAN: >60
GLUCOSE BLD-MCNC: 96 MG/DL (ref 74–109)
HCT VFR BLD CALC: 43 % (ref 42–52)
HEMOGLOBIN: 14.6 G/DL (ref 14–18)
LYMPHOCYTES ABSOLUTE: 0.8 K/UL (ref 1–4.8)
LYMPHOCYTES RELATIVE PERCENT: 6.3 %
MCH RBC QN AUTO: 27.9 PG (ref 27–31.3)
MCHC RBC AUTO-ENTMCNC: 34 % (ref 33–37)
MCV RBC AUTO: 82.1 FL (ref 80–100)
MONOCYTES ABSOLUTE: 0.5 K/UL (ref 0.2–0.8)
MONOCYTES RELATIVE PERCENT: 3.5 %
NEUTROPHILS ABSOLUTE: 11.9 K/UL (ref 1.4–6.5)
NEUTROPHILS RELATIVE PERCENT: 89.1 %
PDW BLD-RTO: 13.8 % (ref 11.5–14.5)
PLATELET # BLD: 335 K/UL (ref 130–400)
POTASSIUM SERPL-SCNC: 3.5 MEQ/L (ref 3.5–5.1)
RAPID INFLUENZA  B AGN: NEGATIVE
RAPID INFLUENZA A AGN: NEGATIVE
RBC # BLD: 5.23 M/UL (ref 4.7–6.1)
SODIUM BLD-SCNC: 138 MEQ/L (ref 132–144)
WBC # BLD: 13.4 K/UL (ref 4.8–10.8)

## 2018-12-25 PROCEDURE — 36415 COLL VENOUS BLD VENIPUNCTURE: CPT

## 2018-12-25 PROCEDURE — 71045 X-RAY EXAM CHEST 1 VIEW: CPT

## 2018-12-25 PROCEDURE — 99284 EMERGENCY DEPT VISIT MOD MDM: CPT

## 2018-12-25 PROCEDURE — 6370000000 HC RX 637 (ALT 250 FOR IP): Performed by: PERSONAL EMERGENCY RESPONSE ATTENDANT

## 2018-12-25 PROCEDURE — 2580000003 HC RX 258: Performed by: PERSONAL EMERGENCY RESPONSE ATTENDANT

## 2018-12-25 PROCEDURE — 80048 BASIC METABOLIC PNL TOTAL CA: CPT

## 2018-12-25 PROCEDURE — 87804 INFLUENZA ASSAY W/OPTIC: CPT

## 2018-12-25 PROCEDURE — 85025 COMPLETE CBC W/AUTO DIFF WBC: CPT

## 2018-12-25 PROCEDURE — 6360000002 HC RX W HCPCS: Performed by: PERSONAL EMERGENCY RESPONSE ATTENDANT

## 2018-12-25 PROCEDURE — 96374 THER/PROPH/DIAG INJ IV PUSH: CPT

## 2018-12-25 RX ORDER — ONDANSETRON 2 MG/ML
4 INJECTION INTRAMUSCULAR; INTRAVENOUS ONCE
Status: COMPLETED | OUTPATIENT
Start: 2018-12-25 | End: 2018-12-25

## 2018-12-25 RX ORDER — ONDANSETRON 4 MG/1
4 TABLET, ORALLY DISINTEGRATING ORAL EVERY 8 HOURS PRN
Qty: 20 TABLET | Refills: 0 | Status: SHIPPED | OUTPATIENT
Start: 2018-12-25 | End: 2019-04-03 | Stop reason: ALTCHOICE

## 2018-12-25 RX ORDER — 0.9 % SODIUM CHLORIDE 0.9 %
1000 INTRAVENOUS SOLUTION INTRAVENOUS ONCE
Status: COMPLETED | OUTPATIENT
Start: 2018-12-25 | End: 2018-12-25

## 2018-12-25 RX ORDER — IBUPROFEN 800 MG/1
800 TABLET ORAL ONCE
Status: COMPLETED | OUTPATIENT
Start: 2018-12-25 | End: 2018-12-25

## 2018-12-25 RX ADMIN — IBUPROFEN 800 MG: 800 TABLET, FILM COATED ORAL at 23:06

## 2018-12-25 RX ADMIN — SODIUM CHLORIDE 1000 ML: 9 INJECTION, SOLUTION INTRAVENOUS at 23:03

## 2018-12-25 RX ADMIN — ONDANSETRON 4 MG: 2 INJECTION INTRAMUSCULAR; INTRAVENOUS at 23:03

## 2018-12-25 ASSESSMENT — PAIN SCALES - GENERAL
PAINLEVEL_OUTOF10: 7
PAINLEVEL_OUTOF10: 7

## 2018-12-25 ASSESSMENT — ENCOUNTER SYMPTOMS
BLOOD IN STOOL: 0
SHORTNESS OF BREATH: 0
RHINORRHEA: 0
SORE THROAT: 0
NAUSEA: 1
DIARRHEA: 1
COLOR CHANGE: 0
COUGH: 0
ABDOMINAL PAIN: 1
VOMITING: 1

## 2018-12-25 ASSESSMENT — PAIN DESCRIPTION - PAIN TYPE: TYPE: ACUTE PAIN

## 2018-12-25 ASSESSMENT — PAIN DESCRIPTION - FREQUENCY: FREQUENCY: CONTINUOUS

## 2018-12-25 ASSESSMENT — PAIN DESCRIPTION - LOCATION: LOCATION: GENERALIZED

## 2018-12-25 ASSESSMENT — PAIN DESCRIPTION - DESCRIPTORS: DESCRIPTORS: ACHING

## 2018-12-26 ENCOUNTER — CLINICAL DOCUMENTATION (OUTPATIENT)
Dept: FAMILY MEDICINE CLINIC | Age: 28
End: 2018-12-26

## 2018-12-26 ENCOUNTER — OFFICE VISIT (OUTPATIENT)
Dept: FAMILY MEDICINE CLINIC | Age: 28
End: 2018-12-26
Payer: COMMERCIAL

## 2018-12-26 VITALS
TEMPERATURE: 98.1 F | HEIGHT: 66 IN | HEART RATE: 88 BPM | SYSTOLIC BLOOD PRESSURE: 106 MMHG | BODY MASS INDEX: 36.64 KG/M2 | RESPIRATION RATE: 16 BRPM | DIASTOLIC BLOOD PRESSURE: 64 MMHG | WEIGHT: 228 LBS

## 2018-12-26 DIAGNOSIS — F41.9 ANXIETY: ICD-10-CM

## 2018-12-26 DIAGNOSIS — F32.A DEPRESSION, UNSPECIFIED DEPRESSION TYPE: ICD-10-CM

## 2018-12-26 DIAGNOSIS — I10 ESSENTIAL HYPERTENSION: Primary | ICD-10-CM

## 2018-12-26 PROCEDURE — G8484 FLU IMMUNIZE NO ADMIN: HCPCS | Performed by: FAMILY MEDICINE

## 2018-12-26 PROCEDURE — G8427 DOCREV CUR MEDS BY ELIG CLIN: HCPCS | Performed by: FAMILY MEDICINE

## 2018-12-26 PROCEDURE — G8417 CALC BMI ABV UP PARAM F/U: HCPCS | Performed by: FAMILY MEDICINE

## 2018-12-26 PROCEDURE — 4004F PT TOBACCO SCREEN RCVD TLK: CPT | Performed by: FAMILY MEDICINE

## 2018-12-26 PROCEDURE — 99214 OFFICE O/P EST MOD 30 MIN: CPT | Performed by: FAMILY MEDICINE

## 2018-12-26 RX ORDER — BUSPIRONE HYDROCHLORIDE 10 MG/1
10 TABLET ORAL 2 TIMES DAILY
Qty: 60 TABLET | Refills: 3 | Status: SHIPPED | OUTPATIENT
Start: 2018-12-26 | End: 2019-01-25

## 2018-12-26 ASSESSMENT — PAIN DESCRIPTION - DESCRIPTORS: DESCRIPTORS: ACHING

## 2018-12-26 ASSESSMENT — PAIN DESCRIPTION - PAIN TYPE: TYPE: ACUTE PAIN

## 2018-12-26 ASSESSMENT — PAIN DESCRIPTION - LOCATION: LOCATION: GENERALIZED

## 2018-12-26 ASSESSMENT — PAIN SCALES - GENERAL: PAINLEVEL_OUTOF10: 3

## 2018-12-26 NOTE — PROGRESS NOTES
paresthesias or headaches. No dysuria, frequency or hematuria. Last labs  Admission on 12/25/2018, Discharged on 12/26/2018   Component Date Value Ref Range Status    Rapid Influenza A Ag 12/25/2018 Negative  Negative Final    Rapid Influenza B Ag 12/25/2018 Negative  Negative Final    Sodium 12/25/2018 138  132 - 144 mEq/L Final    Potassium 12/25/2018 3.5  3.5 - 5.1 mEq/L Final    Chloride 12/25/2018 100  98 - 107 mEq/L Final    CO2 12/25/2018 23  22 - 29 mEq/L Final    Anion Gap 12/25/2018 15* 7 - 13 mEq/L Final    Glucose 12/25/2018 96  74 - 109 mg/dL Final    BUN 12/25/2018 15  6 - 20 mg/dL Final    CREATININE 12/25/2018 0.92  0.70 - 1.20 mg/dL Final    GFR Non- 12/25/2018 >60.0  >60 Final    Comment: >60 mL/min/1.73m2 EGFR, calc. for ages 25 and older using the  MDRD formula (not corrected for weight), is valid for stable  renal function.  GFR  12/25/2018 >60.0  >60 Final    Comment: >60 mL/min/1.73m2 EGFR, calc. for ages 25 and older using the  MDRD formula (not corrected for weight), is valid for stable  renal function.       Calcium 12/25/2018 8.9  8.6 - 10.2 mg/dL Final    WBC 12/25/2018 13.4* 4.8 - 10.8 K/uL Final    RBC 12/25/2018 5.23  4.70 - 6.10 M/uL Final    Hemoglobin 12/25/2018 14.6  14.0 - 18.0 g/dL Final    Hematocrit 12/25/2018 43.0  42.0 - 52.0 % Final    MCV 12/25/2018 82.1  80.0 - 100.0 fL Final    MCH 12/25/2018 27.9  27.0 - 31.3 pg Final    MCHC 12/25/2018 34.0  33.0 - 37.0 % Final    RDW 12/25/2018 13.8  11.5 - 14.5 % Final    Platelets 25/16/3085 335  130 - 400 K/uL Final    Neutrophils % 12/25/2018 89.1  % Final    Lymphocytes % 12/25/2018 6.3  % Final    Monocytes % 12/25/2018 3.5  % Final    Eosinophils % 12/25/2018 0.5  % Final    Basophils % 12/25/2018 0.6  % Final    Neutrophils # 12/25/2018 11.9* 1.4 - 6.5 K/uL Final    Lymphocytes # 12/25/2018 0.8* 1.0 - 4.8 K/uL Final    Monocytes # 12/25/2018 0.5  0.2 - rebound. Normo active bowel sounds. EXTREMITIES:  No edema in any extremity. No cyanosis or clubbing. 2+ dorsalis pedis pulses bilaterally          Assessment & Plan    Diagnosis Orders   1. Essential hypertension     2. Anxiety     3. Depression, unspecified depression type       No orders of the defined types were placed in this encounter. No orders of the defined types were placed in this encounter. There are no discontinued medications. No Follow-up on file. Likely viral gastro and caused likely wbc elevation  Offered to recheck in a few mo  Hx zoloft  Mood issues  Often anxiety  More anxiety than anything else  Offered counseling  Start buspar qd bid prn  Discussed risks, benefits, and alternatives of this medicine and advised to call and discontinue if concerning side effects.   No hi/si    Aliyah Adorno MD

## 2018-12-26 NOTE — ED NOTES
Patient discharge instructions reviewed with patient and significant other at this time. Patient and significant other verbalized understanding of the instructions reviewed with them, need for further follow up, medications prescribed, and when to return to the ER for worsening or persistent symptoms. No distress noted at this time. Patient denies any further comments, questions, or concerns at this time.       Blanche Ingram RN  12/26/18 [id number]

## 2019-01-04 ENCOUNTER — HOSPITAL ENCOUNTER (EMERGENCY)
Age: 29
Discharge: HOME | End: 2019-01-05
Attending: EMERGENCY MEDICINE
Payer: COMMERCIAL

## 2019-01-04 VITALS
HEART RATE: 89 BPM | RESPIRATION RATE: 16 BRPM | BODY MASS INDEX: 35.36 KG/M2 | WEIGHT: 220 LBS | TEMPERATURE: 98.1 F | OXYGEN SATURATION: 97 % | DIASTOLIC BLOOD PRESSURE: 86 MMHG | HEIGHT: 66 IN | SYSTOLIC BLOOD PRESSURE: 123 MMHG

## 2019-01-04 DIAGNOSIS — L24.0 CONTACT DERMATITIS AND ECZEMA DUE TO DETERGENTS: Primary | ICD-10-CM

## 2019-01-04 PROCEDURE — 99282 EMERGENCY DEPT VISIT SF MDM: CPT

## 2019-01-04 RX ORDER — ALPRAZOLAM 0.5 MG/1
0.5 TABLET ORAL NIGHTLY PRN
COMMUNITY
End: 2019-03-26 | Stop reason: SDUPTHER

## 2019-01-04 RX ORDER — TRIAMCINOLONE ACETONIDE 1 MG/G
CREAM TOPICAL
Qty: 1 TUBE | Refills: 0 | Status: SHIPPED | OUTPATIENT
Start: 2019-01-04 | End: 2019-04-03 | Stop reason: ALTCHOICE

## 2019-01-04 RX ORDER — METHYLPREDNISOLONE SODIUM SUCCINATE 125 MG/2ML
80 INJECTION, POWDER, LYOPHILIZED, FOR SOLUTION INTRAMUSCULAR; INTRAVENOUS ONCE
Status: COMPLETED | OUTPATIENT
Start: 2019-01-04 | End: 2019-01-05

## 2019-01-04 ASSESSMENT — PAIN DESCRIPTION - DESCRIPTORS: DESCRIPTORS: CONSTANT;BURNING

## 2019-01-04 ASSESSMENT — PAIN DESCRIPTION - PAIN TYPE: TYPE: ACUTE PAIN

## 2019-01-04 ASSESSMENT — PAIN DESCRIPTION - LOCATION: LOCATION: HAND

## 2019-01-04 ASSESSMENT — PAIN DESCRIPTION - ORIENTATION: ORIENTATION: RIGHT;LEFT

## 2019-01-04 ASSESSMENT — PAIN SCALES - GENERAL: PAINLEVEL_OUTOF10: 7

## 2019-01-05 PROCEDURE — 96372 THER/PROPH/DIAG INJ SC/IM: CPT

## 2019-01-05 PROCEDURE — 6360000002 HC RX W HCPCS: Performed by: EMERGENCY MEDICINE

## 2019-01-05 RX ADMIN — METHYLPREDNISOLONE SODIUM SUCCINATE 80 MG: 125 INJECTION, POWDER, FOR SOLUTION INTRAMUSCULAR; INTRAVENOUS at 00:14

## 2019-02-25 ENCOUNTER — TELEPHONE (OUTPATIENT)
Dept: FAMILY MEDICINE CLINIC | Age: 29
End: 2019-02-25

## 2019-03-26 DIAGNOSIS — G47.00 INSOMNIA, UNSPECIFIED TYPE: Primary | ICD-10-CM

## 2019-03-26 RX ORDER — ALPRAZOLAM 0.25 MG/1
0.25 TABLET ORAL NIGHTLY PRN
Qty: 5 TABLET | Refills: 0 | Status: SHIPPED | OUTPATIENT
Start: 2019-03-26 | End: 2019-04-11 | Stop reason: SDUPTHER

## 2019-03-26 RX ORDER — ALPRAZOLAM 0.5 MG/1
0.5 TABLET ORAL NIGHTLY PRN
Qty: 5 TABLET | Refills: 0 | Status: SHIPPED | OUTPATIENT
Start: 2019-03-26 | End: 2019-03-26 | Stop reason: DRUGHIGH

## 2019-04-03 ENCOUNTER — OFFICE VISIT (OUTPATIENT)
Dept: FAMILY MEDICINE CLINIC | Age: 29
End: 2019-04-03
Payer: COMMERCIAL

## 2019-04-03 VITALS
SYSTOLIC BLOOD PRESSURE: 130 MMHG | WEIGHT: 226.2 LBS | OXYGEN SATURATION: 98 % | DIASTOLIC BLOOD PRESSURE: 88 MMHG | HEART RATE: 85 BPM | BODY MASS INDEX: 36.35 KG/M2 | RESPIRATION RATE: 15 BRPM | TEMPERATURE: 98.5 F | HEIGHT: 66 IN

## 2019-04-03 DIAGNOSIS — F41.9 ANXIETY: ICD-10-CM

## 2019-04-03 DIAGNOSIS — F32.A DEPRESSION, UNSPECIFIED DEPRESSION TYPE: Primary | ICD-10-CM

## 2019-04-03 DIAGNOSIS — R03.0 BLOOD PRESSURE ELEVATED WITHOUT HISTORY OF HTN: ICD-10-CM

## 2019-04-03 DIAGNOSIS — E66.09 CLASS 2 OBESITY DUE TO EXCESS CALORIES WITHOUT SERIOUS COMORBIDITY IN ADULT, UNSPECIFIED BMI: ICD-10-CM

## 2019-04-03 PROCEDURE — 99214 OFFICE O/P EST MOD 30 MIN: CPT | Performed by: INTERNAL MEDICINE

## 2019-04-03 PROCEDURE — G8417 CALC BMI ABV UP PARAM F/U: HCPCS | Performed by: INTERNAL MEDICINE

## 2019-04-03 PROCEDURE — 4004F PT TOBACCO SCREEN RCVD TLK: CPT | Performed by: INTERNAL MEDICINE

## 2019-04-03 PROCEDURE — G8427 DOCREV CUR MEDS BY ELIG CLIN: HCPCS | Performed by: INTERNAL MEDICINE

## 2019-04-03 RX ORDER — BUSPIRONE HYDROCHLORIDE 7.5 MG/1
7.5 TABLET ORAL 3 TIMES DAILY
Qty: 90 TABLET | Refills: 0 | Status: SHIPPED | OUTPATIENT
Start: 2019-04-03 | End: 2019-05-04 | Stop reason: SDUPTHER

## 2019-04-03 ASSESSMENT — ENCOUNTER SYMPTOMS
EYE PAIN: 0
BACK PAIN: 0
ABDOMINAL PAIN: 0
SHORTNESS OF BREATH: 0

## 2019-04-03 NOTE — PROGRESS NOTES
Subjective:      Patient ID: Esa Monzon is a 29 y.o. male who presents today with:  Chief Complaint   Patient presents with    Establish Care     prev PCP Estuardo Barba Hypertension    Depression    Anxiety    Discuss Medications     wnats to discuss his BP meds        HPI    Anxiety and depression-He takes xanax prn. No side effects. Mentions is not taking buspirone due to fear of side effects. Does not see other mental health professionals. No known manic symptoms. Has failed sertaline in the past.     elevated blood pressure-Intermittent issue, known obesity with BMI of 36.5. Not on treatment. Has co-existing anxiety and depression. Denies sweating, weight loss. Past Medical History:   Diagnosis Date    Anxiety     Depression     Essential hypertension 10/10/2017    Jejunal intussusception ([facility] [address]     Nonintractable migraine 10/10/2017     No past surgical history on file.   Social History     Socioeconomic History    Marital status: Single     Spouse name: Not on file    Number of children: Not on file    Years of education: Not on file    Highest education level: Not on file   Occupational History    Not on file   Social Needs    Financial resource strain: Not on file    Food insecurity:     Worry: Not on file     Inability: Not on file    Transportation needs:     Medical: Not on file     Non-medical: Not on file   Tobacco Use    Smoking status: Current Every Day Smoker     Packs/day: 0.50     Years: 5.00     Pack years: 2.50     Last attempt to quit: 2017     Years since quittin.2    Smokeless tobacco: Never Used   Substance and Sexual Activity    Alcohol use: No    Drug use: No    Sexual activity: Not on file   Lifestyle    Physical activity:     Days per week: Not on file     Minutes per session: Not on file    Stress: Not on file   Relationships    Social connections:     Talks on phone: Not on file     Gets together: Not on file     Attends Orthodox service: Not on file     Active member of club or organization: Not on file     Attends meetings of clubs or organizations: Not on file     Relationship status: Not on file    Intimate partner violence:     Fear of current or ex partner: Not on file     Emotionally abused: Not on file     Physically abused: Not on file     Forced sexual activity: Not on file   Other Topics Concern    Not on file   Social History Narrative    Not on file     Allergies   Allergen Reactions    Amoxil [Amoxicillin]      unknow to pt happens happens when he takes it     Current Outpatient Medications on File Prior to Visit   Medication Sig Dispense Refill    verapamil (CALAN SR) 240 MG extended release tablet Take 1 tablet by mouth daily 90 tablet 1     No current facility-administered medications on file prior to visit. I have personally reviewed the ROS, PMH, PFH, and social history     Review of Systems   Constitutional: Negative for chills. HENT: Negative for congestion. Eyes: Negative for pain. Respiratory: Negative for shortness of breath. Cardiovascular: Negative for chest pain. Gastrointestinal: Negative for abdominal pain. Genitourinary: Negative for hematuria. Musculoskeletal: Negative for back pain. Allergic/Immunologic: Negative for immunocompromised state. Neurological: Negative for headaches. Psychiatric/Behavioral: Negative for hallucinations. The patient is nervous/anxious. Objective:   /88 (Site: Left Upper Arm, Position: Sitting, Cuff Size: Large Adult)   Pulse 85   Temp 98.5 °F (36.9 °C) (Tympanic)   Resp 15   Ht 5' 6\" (1.676 m)   Wt 226 lb 3.2 oz (102.6 kg)   SpO2 98%   BMI 36.51 kg/m²     Physical Exam   Constitutional: He is oriented to person, place, and time. He appears well-developed and well-nourished. HENT:   Head: Normocephalic. Eyes: Pupils are equal, round, and reactive to light. Neck: No tracheal deviation present.    Cardiovascular: Normal rate, regular rhythm and normal heart sounds. Exam reveals no gallop and no friction rub. No murmur heard. Pulmonary/Chest: No respiratory distress. Abdominal: Soft. Bowel sounds are normal. He exhibits no distension. There is no rebound. Musculoskeletal: He exhibits no edema. Neurological: He is oriented to person, place, and time. Skin: Skin is warm and dry. Assessment:       Diagnosis Orders   1. Depression, unspecified depression type     2. Anxiety     3. Blood pressure elevated without history of HTN     4. Class 2 obesity due to excess calories without serious comorbidity in adult, unspecified BMI  [facility]         Plan:   CSA signed for xanax just in case  Risks explained of addiction, wd, seizure, death, etc.   Start buspirone (he had been reluctant to start given interactions and potential side effects)   Failed sertraline (2 years) kept increasing the doseage because it wasn't working. Start viibrid   NO si/hi  Follow up in 4 weeks   Understands SSI can increase SI and to call immediately if this should occur. Monitor bp for now,keep a log and call me in 2 weeks with a log   No treatment  Working on weight loss. Labs next visit Dietician referral.   Wants to wait on psychaitry. Orders Placed This Encounter   Procedures   [id number]     Referral Priority:   Routine     Referral Type:   Eval and Treat     Referral Reason:   Specialty Services Required     Requested Specialty:   Nutrition     Number of Visits Requested:   1     Orders Placed This Encounter   Medications    Vilazodone HCl 10 & 20 MG KIT     Sig: Use as directed on box     Dispense:  1 kit     Refill:  0    busPIRone (BUSPAR) 7.5 MG tablet     Sig: Take 1 tablet by mouth 3 times daily     Dispense:  90 tablet     Refill:  0       Return in about 1 month (around 5/1/2019) for regularly scheduled appointment with PCP, worsening symptoms, call ASAP for appointment.       Huey Das Janae Berry MD    If anything should change or worsen call ASAP, don't wait for next scheduled appointment.

## 2019-04-03 NOTE — PATIENT INSTRUCTIONS
Patient Education        vilazodone  Pronunciation:  emily [place] oh done  Brand:  Pabloveena Liraelle  What is the most important information I should know about vilazodone? You should not use vilazodone if you are being treated with methylene blue injection. Do not use this medicine if you have used an MAO inhibitor in the past 14 days, such as isocarboxazid, linezolid, methylene blue injection, phenelzine, rasagiline, selegiline, or tranylcypromine. Some young people have thoughts about suicide when first taking an antidepressant. Stay alert to changes in your mood or symptoms. Report any new or worsening symptoms to your doctor. Vilazodone is not approved for use in children. What is vilazodone? Vilazodone is an antidepressant in a group of drugs called selective serotonin reuptake inhibitors (SSRIs). Vilazodone is used to treat major depressive disorder (MDD). Vilazodone may also be used for purposes not listed in this medication guide. What should I discuss with my healthcare provider before taking vilazodone? You should not use vilazodone if you are being treated with methylene blue injection. Do not use vilazodone if you have used an MAO inhibitor in the past 14 days. A dangerous drug interaction could occur. MAO inhibitors include isocarboxazid, linezolid, methylene blue injection, phenelzine, rasagiline, selegiline, tranylcypromine, and others. After you stop taking vilazodone, you must wait at least 14 days before you start taking an MAOI. Some medicines can interact with vilazodone and cause a serious condition called serotonin syndrome. Be sure your doctor knows about all other medicines you use. Ask your doctor before making any changes in how or when you take your medications.    To make sure vilazodone is safe for you, tell your doctor if you have:  · liver or kidney disease;  · a bleeding or blood clotting disorder;  · narrow-angle glaucoma;  · seizures or epilepsy;  · bipolar disorder (manic depression);  · a history of drug abuse or suicidal thoughts; or  · if you drink alcohol. Some young people have thoughts about suicide when first taking an antidepressant. Your doctor should check your progress at regular visits. Your family or other caregivers should also be alert to changes in your mood or symptoms. Taking an SSRI antidepressant during pregnancy may cause serious lung problems or other complications in the baby. However, you may have a relapse of depression if you stop taking your antidepressant. Tell your doctor right away if you become pregnant. Do not start or stop taking this medicine during pregnancy without your doctor's advice. It is not known whether vilazodone passes into breast milk or if it could harm a nursing baby. Tell your doctor if you are breast-feeding a baby. Do not give this medicine to anyone under 25years old without medical advice. Vilazodone is not approved for use in children. How should I take vilazodone? Follow all directions on your prescription label. Your doctor may occasionally change your dose to make sure you get the best results. Do not take this medicine in larger or smaller amounts or for longer than recommended. Vilazodone works best if you take it with food. It may take several weeks or months before your symptoms improve. Keep using the medication as directed and tell your doctor if your symptoms do not improve, or if they get worse. Do not stop using vilazodone suddenly, or you could have unpleasant withdrawal symptoms. Ask your doctor how to safely stop using vilazodone. Store at room temperature away from moisture and heat. What happens if I miss a dose? Take the missed dose as soon as you remember. Skip the missed dose if it is almost time for your next scheduled dose. Do not take extra medicine to make up the missed dose. What happens if I overdose? Seek emergency medical attention or call the Poison Help line at 1-554.446.7278.  An overdose of vilazodone can be fatal.  What should I avoid while taking vilazodone? Drinking alcohol with this medicine can cause side effects. Ask your doctor before taking a nonsteroidal anti-inflammatory drug (NSAID) for pain, arthritis, fever, or swelling. This includes aspirin, ibuprofen (Advil, Motrin), naproxen (Aleve), celecoxib (Celebrex), diclofenac, indomethacin, meloxicam, and others. Using an NSAID with vilazodone may cause you to bruise or bleed easily. This medication may impair your thinking or reactions. Be careful if you drive or do anything that requires you to be alert. What are the possible side effects of vilazodone? Get emergency medical help if you have signs of an allergic reaction: skin rash or hives; difficulty breathing; swelling of your face, lips, tongue, or throat. Report any new or worsening symptoms to your doctor, such as: mood or behavior changes, anxiety, panic attacks, trouble sleeping, or if you feel impulsive, irritable, agitated, hostile, aggressive, restless, hyperactive (mentally or physically), more depressed, or have thoughts about suicide or hurting yourself. Call your doctor at once if you have:  · a seizure (convulsions);  · blurred vision, tunnel vision, eye pain or swelling, or seeing halos around lights;  · easy bruising, unusual bleeding (nose, mouth, vagina, or rectum), blood in your urine or stools, purple or red pinpoint spots under your skin;  · racing thoughts, unusual risk-taking behavior, decreased inhibitions, feelings of extreme happiness or sadness; or  · high levels of serotonin in the body --agitation, hallucinations, fever, fast heart rate, overactive reflexes, nausea, vomiting, diarrhea, fainting; or  · low levels of sodium in the body --headache, confusion, slurred speech, severe weakness, loss of coordination, feeling unsteady. Common side effects may include:  · vision changes;  · diarrhea, mild nausea; or  · sleep problems (insomnia).   This is not a complete list of side effects and others may occur. Call your doctor for medical advice about side effects. You may report side effects to FDA at 3-450-FDA-8204. What other drugs will affect vilazodone? Taking vilazodone with other drugs that make you sleepy or slow your breathing can cause dangerous side effects or death. Ask your doctor before taking a sleeping pill, narcotic pain medicine, prescription cough medicine, a muscle relaxer, or medicine for anxiety, depression, or seizures. Many drugs can interact with vilazodone. Not all possible interactions are listed here. Tell your doctor about all your current medicines and any you start or stop using, especially:  · any other antidepressant;  · a diuretic or \"water pill\";  · mephenytoin;  · Pippa's wort;  · a blood thinner --warfarin, Coumadin, Jantoven;  · medicine to treat anxiety, mood disorders, thought disorders, or mental illness --amitriptyline, buspirone, desipramine, lithium, nortriptyline, and many others;  · medicine to treat ADHD or narcolepsy --Adderall, Concerta, Ritalin, Vyvanse, Zenzedi, and others;  · migraine headache medicine --rizatriptan, sumatriptan, zolmitriptan, and others; or  · narcotic pain medicine --fentanyl, tramadol. This list is not complete and many other drugs can interact with vilazodone. This includes prescription and over-the-counter medicines, vitamins, and herbal products. Give a list of all your medicines to any healthcare provider who treats you. Where can I get more information? Your pharmacist can provide more information about vilazodone. Remember, keep this and all other medicines out of the reach of children, never share your medicines with others, and use this medication only for the indication prescribed. Every effort has been made to ensure that the information provided by [address] is accurate, up-to-date, and complete, but no guarantee is made to that effect.  Drug information contained herein may be time sensitive. [facility] information has been compiled for use by healthcare practitioners and consumers in the [facility] and therefore [facility] does not warrant that uses outside of the [facility] are appropriate, unless specifically indicated otherwise. [facility] drug information does not endorse drugs, diagnose patients or recommend therapy. [facility] drug information is an informational resource designed to assist licensed healthcare practitioners in caring for their patients and/or to serve consumers viewing this service as a supplement to, and not a substitute for, the expertise, skill, knowledge and judgment of healthcare practitioners. The absence of a warning for a given drug or drug combination in no way should be construed to indicate that the drug or drug combination is safe, effective or appropriate for any given patient. [facility] does not assume any responsibility for any aspect of healthcare administered with the aid of information [facility] provides. The information contained herein is not intended to cover all possible uses, directions, precautions, warnings, drug interactions, allergic reactions, or adverse effects. If you have questions about the drugs you are taking, check with your doctor, nurse or pharmacist.  Copyright 9820-2041 [address]. Version: 5.01. Revision date: 3/8/2017. Care instructions adapted under license by [facility] [address] If you have questions about a medical condition or this instruction, always ask your healthcare professional. [place] [address] any warranty or liability for your use of this information. Patient Education        buspirone  Pronunciation:  gaye rosario  Brand: BuSpar  What is the most important information I should know about buspirone? Do not use buspirone if you have taken an MAO inhibitor in the past 14 days. A dangerous drug interaction could occur.  MAO inhibitors include isocarboxazid, complete list of side effects and others may occur. Call your doctor for medical advice about side effects. You may report side effects to FDA at 5-440-JKG-3962. What other drugs will affect buspirone? Taking this medicine with other drugs that make you sleepy or slow your breathing can worsen these effects. Ask your doctor before taking buspirone with a sleeping pill, narcotic pain medicine, muscle relaxer, or medicine for anxiety, depression, or seizures. Other drugs may interact with buspirone, including prescription and over-the-counter medicines, vitamins, and herbal products. Tell each of your health care providers about all medicines you use now and any medicine you start or stop using. Where can I get more information? Your pharmacist can provide more information about buspirone. Remember, keep this and all other medicines out of the reach of children, never share your medicines with others, and use this medication only for the indication prescribed. Every effort has been made to ensure that the information provided by [address] is accurate, up-to-date, and complete, but no guarantee is made to that effect. Drug information contained herein may be time sensitive. [facility] information has been compiled for use by healthcare practitioners and consumers in the [place] and therefore [facility] does not warrant that uses outside of the [place] are appropriate, unless specifically indicated otherwise. [facility]'s drug information does not endorse drugs, diagnose patients or recommend therapy. [facility] drug information is an informational resource designed to assist licensed healthcare practitioners in caring for their patients and/or to serve consumers viewing this service as a supplement to, and not a substitute for, the expertise, skill, knowledge and judgment of healthcare practitioners.  The absence of a warning for a given drug or drug combination in no way should be construed to indicate that the drug or drug combination is safe, effective or appropriate for any given patient. [facility] does not assume any responsibility for any aspect of healthcare administered with the aid of information [facility] provides. The information contained herein is not intended to cover all possible uses, directions, precautions, warnings, drug interactions, allergic reactions, or adverse effects. If you have questions about the drugs you are taking, check with your doctor, nurse or pharmacist.  Copyright 3455-6781 [address]. Version: 5.01. Revision date: 12/14/2015. Care instructions adapted under license by [facility] ([facility]). If you have questions about a medical condition or this instruction, always ask your healthcare professional. [place] [address] any warranty or liability for your use of this information.

## 2019-04-10 ENCOUNTER — HOSPITAL ENCOUNTER (EMERGENCY)
Age: 29
Discharge: HOME | End: 2019-04-10
Attending: EMERGENCY MEDICINE
Payer: COMMERCIAL

## 2019-04-10 VITALS
DIASTOLIC BLOOD PRESSURE: 90 MMHG | RESPIRATION RATE: 19 BRPM | SYSTOLIC BLOOD PRESSURE: 118 MMHG | OXYGEN SATURATION: 96 % | BODY MASS INDEX: 36.32 KG/M2 | HEIGHT: 66 IN | HEART RATE: 61 BPM | TEMPERATURE: 98.5 F | WEIGHT: 226 LBS

## 2019-04-10 DIAGNOSIS — R07.89 ATYPICAL CHEST PAIN: Primary | ICD-10-CM

## 2019-04-10 DIAGNOSIS — F41.1 ANXIETY STATE: ICD-10-CM

## 2019-04-10 LAB
EKG ATRIAL RATE: 59 BPM
EKG P AXIS: 27 DEGREES
EKG P-R INTERVAL: 142 MS
EKG Q-T INTERVAL: 418 MS
EKG QRS DURATION: 90 MS
EKG QTC CALCULATION (BAZETT): 413 MS
EKG R AXIS: -4 DEGREES
EKG T AXIS: 5 DEGREES
EKG VENTRICULAR RATE: 59 BPM

## 2019-04-10 PROCEDURE — 99285 EMERGENCY DEPT VISIT HI MDM: CPT

## 2019-04-10 PROCEDURE — 93005 ELECTROCARDIOGRAM TRACING: CPT

## 2019-04-10 ASSESSMENT — ENCOUNTER SYMPTOMS
SHORTNESS OF BREATH: 0
ABDOMINAL PAIN: 0
NAUSEA: 0
SORE THROAT: 0
VOMITING: 0
CHEST TIGHTNESS: 1
EYE PAIN: 0

## 2019-04-10 ASSESSMENT — PAIN DESCRIPTION - PAIN TYPE: TYPE: ACUTE PAIN

## 2019-04-10 ASSESSMENT — PAIN SCALES - GENERAL: PAINLEVEL_OUTOF10: 0

## 2019-04-10 ASSESSMENT — PAIN DESCRIPTION - LOCATION: LOCATION: CHEST

## 2019-04-10 NOTE — ED PROVIDER NOTES
[address] [facility] ED  eMERGENCY dEPARTMENTeNCOUnter      Pt Name: Dejan Mcfadden  MRN: [id number]  [place] 1990  Date ofevaluation: 4/10/2019  Provider: Roland Hall DO    CHIEF COMPLAINT       Chief Complaint   Patient presents with    Chest Pain         HISTORY OF PRESENT ILLNESS   (Location/Symptom, Timing/Onset,Context/Setting, Quality, Duration, Modifying Factors, Severity)  Note limiting factors. Dejan Mcfadden is a 29 y.o. male who presents to the emergency department . Patient came in with mid chest discomfort which was transient. Then he got very nervous and started taking his pulse and thought his pulse was slow. He then panicked. That is why he is here. Patient has history of depression and anxiety. HPI    NursingNotes were reviewed. REVIEW OF SYSTEMS    (2-9 systems for level 4, 10 or more for level 5)     Review of Systems   Constitutional: Negative for activity change, appetite change and fatigue. HENT: Negative for congestion and sore throat. Eyes: Negative for pain and visual disturbance. Respiratory: Positive for chest tightness. Negative for shortness of breath. Cardiovascular: Negative for chest pain. Gastrointestinal: Negative for abdominal pain, nausea and vomiting. Endocrine: Negative for polydipsia. Genitourinary: Negative for flank pain and urgency. Musculoskeletal: Negative for gait problem and neck stiffness. Skin: Negative for rash. Neurological: Negative for weakness, light-headedness and headaches. Psychiatric/Behavioral: Negative for confusion and sleep disturbance. Except as noted above the remainder of the review of systems was reviewed and negative.        PAST MEDICAL HISTORY     Past Medical History:   Diagnosis Date    Anxiety     Depression     Essential hypertension 10/10/2017    Jejunal intussusception ([facility] [address]     Nonintractable migraine 10/10/2017         SURGICALHISTORY     No past surgical history on file.      CURRENT MEDICATIONS       Previous Medications    BUSPIRONE (BUSPAR) 7.5 MG TABLET    Take 1 tablet by mouth 3 times daily    VERAPAMIL (CALAN SR) 240 MG EXTENDED RELEASE TABLET    Take 1 tablet by mouth daily    VILAZODONE HCL 10 & 20 MG KIT    Use as directed on box       ALLERGIES     Amoxil [amoxicillin]    FAMILY HISTORY       Family History   Problem Relation Age of Onset    High Blood Pressure Father     Mental Illness Father     Other Father         gout          SOCIAL HISTORY       Social History     Socioeconomic History    Marital status: Single     Spouse name: Not on file    Number of children: Not on file    Years of education: Not on file    Highest education level: Not on file   Occupational History    Not on file   Social Needs    Financial resource strain: Not on file    Food insecurity:     Worry: Not on file     Inability: Not on file    Transportation needs:     Medical: Not on file     Non-medical: Not on file   Tobacco Use    Smoking status: Current Every Day Smoker     Packs/day: 0.50     Years: 5.00     Pack years: 2.50     Last attempt to quit: 2017     Years since quittin.3    Smokeless tobacco: Never Used   Substance and Sexual Activity    Alcohol use: No    Drug use: No    Sexual activity: Not on file   Lifestyle    Physical activity:     Days per week: Not on file     Minutes per session: Not on file    Stress: Not on file   Relationships    Social connections:     Talks on phone: Not on file     Gets together: Not on file     Attends Congregation service: Not on file     Active member of club or organization: Not on file     Attends meetings of clubs or organizations: Not on file     Relationship status: Not on file    Intimate partner violence:     Fear of current or ex partner: Not on file     Emotionally abused: Not on file     Physically abused: Not on file     Forced sexual activity: Not on file   Other Topics Concern    Not on file   Social History Narrative    Not on file       SCREENINGS              PHYSICAL EXAM    (up to 7 for level 4, 8 or more for level 5)     ED Triage Vitals [04/10/19 1346]   BP Temp Temp Source Pulse Resp SpO2 Height Weight   (!) 154/102 98.5 °F (36.9 °C) Oral 68 22 99 % 5' 6\" (1.676 m) 226 lb (102.5 kg)       Physical Exam   Constitutional: He is oriented to person, place, and time. He appears well-developed and well-nourished. No distress. HENT:   Head: Normocephalic and atraumatic. Right Ear: External ear normal.   Left Ear: External ear normal.   Mouth/Throat: Oropharynx is clear and moist. No oropharyngeal exudate. Eyes: Pupils are equal, round, and reactive to light. Conjunctivae are normal.   Neck: Normal range of motion. Neck supple. No JVD present. No tracheal deviation present. No thyromegaly present. Cardiovascular: Normal rate and normal heart sounds. No murmur heard. Pulmonary/Chest: Effort normal and breath sounds normal. No respiratory distress. He has no wheezes. Abdominal: Soft. Bowel sounds are normal. There is no tenderness. There is no guarding. Musculoskeletal: Normal range of motion. He exhibits no edema. Neurological: He is alert and oriented to person, place, and time. No cranial nerve deficit. Skin: Skin is warm and dry. No rash noted. He is not diaphoretic. Psychiatric: He has a normal mood and affect.  His behavior is normal.       DIAGNOSTIC RESULTS     EKG: All EKG's are interpreted by the Emergency Department Physician who either signs or Co-signsthis chart in the absence of a cardiologist.        RADIOLOGY:   Darliss Snow such as CT, Ultrasound and MRI are read by the radiologist. Plain radiographic images are visualized and preliminarily interpreted by the emergency physician with the below findings:        Interpretation per the Radiologist below, if available at the time ofthis note:    No orders to display         ED BEDSIDE ULTRASOUND:   Performed by ED

## 2019-04-11 ENCOUNTER — TELEPHONE (OUTPATIENT)
Dept: FAMILY MEDICINE CLINIC | Age: 29
End: 2019-04-11

## 2019-04-11 DIAGNOSIS — G47.00 INSOMNIA, UNSPECIFIED TYPE: ICD-10-CM

## 2019-04-11 NOTE — TELEPHONE ENCOUNTER
Pt said Xanax is working for him as a trial; however, had severe episode yesterday & went to ED and asking for 30-day supply.

## 2019-04-12 ENCOUNTER — TELEPHONE (OUTPATIENT)
Dept: FAMILY MEDICINE CLINIC | Age: 29
End: 2019-04-12

## 2019-04-12 PROCEDURE — 93010 ELECTROCARDIOGRAM REPORT: CPT | Performed by: INTERNAL MEDICINE

## 2019-04-12 RX ORDER — ALPRAZOLAM 0.25 MG/1
TABLET ORAL
Qty: 30 TABLET | Refills: 0 | Status: SHIPPED | OUTPATIENT
Start: 2019-04-12 | End: 2020-04-13 | Stop reason: SDUPTHER

## 2019-04-12 NOTE — TELEPHONE ENCOUNTER
oarrs reviewed. Xanax sent, he should have csa but don't see in chart, will need it if not, needs to follow up within 30 days.

## 2019-04-16 ENCOUNTER — OFFICE VISIT (OUTPATIENT)
Dept: FAMILY MEDICINE CLINIC | Age: 29
End: 2019-04-16
Payer: COMMERCIAL

## 2019-04-16 VITALS
SYSTOLIC BLOOD PRESSURE: 138 MMHG | HEART RATE: 112 BPM | DIASTOLIC BLOOD PRESSURE: 84 MMHG | TEMPERATURE: 98.7 F | WEIGHT: 223 LBS | BODY MASS INDEX: 35.84 KG/M2 | OXYGEN SATURATION: 98 % | HEIGHT: 66 IN | RESPIRATION RATE: 15 BRPM

## 2019-04-16 DIAGNOSIS — I10 ESSENTIAL HYPERTENSION: ICD-10-CM

## 2019-04-16 DIAGNOSIS — E66.09 CLASS 2 OBESITY DUE TO EXCESS CALORIES WITHOUT SERIOUS COMORBIDITY WITH BODY MASS INDEX (BMI) OF 35.0 TO 35.9 IN ADULT: ICD-10-CM

## 2019-04-16 DIAGNOSIS — R07.9 CHEST PAIN, UNSPECIFIED TYPE: ICD-10-CM

## 2019-04-16 DIAGNOSIS — K21.9 GASTROESOPHAGEAL REFLUX DISEASE WITHOUT ESOPHAGITIS: ICD-10-CM

## 2019-04-16 DIAGNOSIS — F32.A DEPRESSION, UNSPECIFIED DEPRESSION TYPE: ICD-10-CM

## 2019-04-16 DIAGNOSIS — F41.9 ANXIETY: Primary | ICD-10-CM

## 2019-04-16 PROCEDURE — G8417 CALC BMI ABV UP PARAM F/U: HCPCS | Performed by: INTERNAL MEDICINE

## 2019-04-16 PROCEDURE — 4004F PT TOBACCO SCREEN RCVD TLK: CPT | Performed by: INTERNAL MEDICINE

## 2019-04-16 PROCEDURE — 99214 OFFICE O/P EST MOD 30 MIN: CPT | Performed by: INTERNAL MEDICINE

## 2019-04-16 PROCEDURE — G8427 DOCREV CUR MEDS BY ELIG CLIN: HCPCS | Performed by: INTERNAL MEDICINE

## 2019-04-16 RX ORDER — AMLODIPINE BESYLATE 2.5 MG/1
2.5 TABLET ORAL DAILY
Qty: 14 TABLET | Refills: 0 | Status: SHIPPED | OUTPATIENT
Start: 2019-04-16 | End: 2019-04-29 | Stop reason: SDUPTHER

## 2019-04-16 RX ORDER — RANITIDINE 150 MG/1
150 TABLET ORAL 2 TIMES DAILY
Qty: 60 TABLET | Refills: 1 | Status: SHIPPED | OUTPATIENT
Start: 2019-04-16 | End: 2020-04-06 | Stop reason: CLARIF

## 2019-04-16 ASSESSMENT — ENCOUNTER SYMPTOMS
ABDOMINAL PAIN: 0
EYE PAIN: 0
BACK PAIN: 0
SHORTNESS OF BREATH: 0

## 2019-04-16 NOTE — PROGRESS NOTES
Orders Placed This Encounter   Procedures    (Gxt) Stress Test Exercise W Out Myoview     Standing Status:   Future     Standing Expiration Date:   4/16/2020     Order Specific Question:   Reason for Exam?     Answer:   Chest pain     Orders Placed This Encounter   Medications    amLODIPine (NORVASC) 2.5 MG tablet     Sig: Take 1 tablet by mouth daily for 14 days     Dispense:  14 tablet     Refill:  0    ranitidine (ZANTAC) 150 MG tablet     Sig: Take 1 tablet by mouth 2 times daily     Dispense:  60 tablet     Refill:  1       Return in about 6 weeks (around 5/28/2019) for regularly scheduled appointment with PCP, worsening symptoms, call ASAP for appointment.       Claudeen Fleming, MD

## 2019-04-22 ENCOUNTER — NURSE ONLY (OUTPATIENT)
Dept: FAMILY MEDICINE CLINIC | Age: 29
End: 2019-04-22
Payer: COMMERCIAL

## 2019-04-22 VITALS — DIASTOLIC BLOOD PRESSURE: 80 MMHG | SYSTOLIC BLOOD PRESSURE: 132 MMHG

## 2019-04-22 DIAGNOSIS — I10 ESSENTIAL HYPERTENSION: Primary | ICD-10-CM

## 2019-04-22 PROCEDURE — 99211 OFF/OP EST MAY X REQ PHY/QHP: CPT | Performed by: INTERNAL MEDICINE

## 2019-04-29 DIAGNOSIS — I10 ESSENTIAL HYPERTENSION: ICD-10-CM

## 2019-04-29 RX ORDER — AMLODIPINE BESYLATE 2.5 MG/1
2.5 TABLET ORAL DAILY
Qty: 90 TABLET | Refills: 0 | Status: SHIPPED | OUTPATIENT
Start: 2019-04-29 | End: 2019-05-28 | Stop reason: SDUPTHER

## 2019-04-29 NOTE — TELEPHONE ENCOUNTER
LAST OV 04/16/2019  NEXT APPT 05/28/2019    Pt states no adverse effects on New Rx AMLODIPINE  and will F/U as advised

## 2019-05-04 DIAGNOSIS — F41.9 ANXIETY: Primary | ICD-10-CM

## 2019-05-04 RX ORDER — BUSPIRONE HYDROCHLORIDE 7.5 MG/1
TABLET ORAL
Qty: 90 TABLET | Refills: 0 | Status: SHIPPED | OUTPATIENT
Start: 2019-05-04 | End: 2019-06-11 | Stop reason: SDUPTHER

## 2019-05-28 ENCOUNTER — OFFICE VISIT (OUTPATIENT)
Dept: FAMILY MEDICINE CLINIC | Age: 29
End: 2019-05-28
Payer: COMMERCIAL

## 2019-05-28 ENCOUNTER — TELEPHONE (OUTPATIENT)
Dept: FAMILY MEDICINE CLINIC | Age: 29
End: 2019-05-28

## 2019-05-28 VITALS
DIASTOLIC BLOOD PRESSURE: 78 MMHG | TEMPERATURE: 98.6 F | HEIGHT: 66 IN | OXYGEN SATURATION: 98 % | BODY MASS INDEX: 35.36 KG/M2 | HEART RATE: 98 BPM | RESPIRATION RATE: 15 BRPM | WEIGHT: 220 LBS | SYSTOLIC BLOOD PRESSURE: 120 MMHG

## 2019-05-28 DIAGNOSIS — Z72.0 TOBACCO USE: ICD-10-CM

## 2019-05-28 DIAGNOSIS — M79.602 LEFT ARM PAIN: ICD-10-CM

## 2019-05-28 DIAGNOSIS — E66.01 CLASS 2 SEVERE OBESITY DUE TO EXCESS CALORIES WITH SERIOUS COMORBIDITY AND BODY MASS INDEX (BMI) OF 35.0 TO 35.9 IN ADULT (HCC): ICD-10-CM

## 2019-05-28 DIAGNOSIS — R07.9 CHEST PAIN, UNSPECIFIED TYPE: Primary | ICD-10-CM

## 2019-05-28 DIAGNOSIS — I10 ESSENTIAL HYPERTENSION: Primary | ICD-10-CM

## 2019-05-28 PROCEDURE — 4004F PT TOBACCO SCREEN RCVD TLK: CPT | Performed by: INTERNAL MEDICINE

## 2019-05-28 PROCEDURE — G8427 DOCREV CUR MEDS BY ELIG CLIN: HCPCS | Performed by: INTERNAL MEDICINE

## 2019-05-28 PROCEDURE — 99214 OFFICE O/P EST MOD 30 MIN: CPT | Performed by: INTERNAL MEDICINE

## 2019-05-28 PROCEDURE — G8417 CALC BMI ABV UP PARAM F/U: HCPCS | Performed by: INTERNAL MEDICINE

## 2019-05-28 RX ORDER — ACETAMINOPHEN 500 MG
500 TABLET ORAL EVERY 6 HOURS PRN
Qty: 120 TABLET | Refills: 1 | Status: SHIPPED | OUTPATIENT
Start: 2019-05-28 | End: 2020-01-13 | Stop reason: SDUPTHER

## 2019-05-28 RX ORDER — AMLODIPINE BESYLATE 2.5 MG/1
2.5 TABLET ORAL DAILY
Qty: 90 TABLET | Refills: 1 | Status: SHIPPED | OUTPATIENT
Start: 2019-05-28 | End: 2019-09-23 | Stop reason: DRUGHIGH

## 2019-05-28 ASSESSMENT — ENCOUNTER SYMPTOMS
EYE PAIN: 0
ABDOMINAL PAIN: 0
BACK PAIN: 0
SHORTNESS OF BREATH: 0

## 2019-05-28 NOTE — TELEPHONE ENCOUNTER
Patient had stress test ordered on 4/16/19 as hospital performed,patient never contacted, can you please reorder and contact patient.

## 2019-05-28 NOTE — PROGRESS NOTES
Subjective:      Patient ID: Jose Luis Stroud is a 29 y.o. male who presents today with:  Chief Complaint   Patient presents with    Follow-up     has not had his stress test schedule as of yet     Hypertension    Arm Pain     left side arm pain intermittently for a few years but recently has become a sharp shooting pain     Nicotine Dependence       HPI     Hypertension-Chronic, essential. Not compliant with low sodium diet. Known obesity. Improved with amlodipine 2.5 mg once daily. Known obesity. Obesity-Chronic, BMI of 35   Not exercising regularly. Known co-existing hypertension. Not exercising. He mentions he gets some exercise at work. He is still smoking, he is trying to quit. He is down to only vaping a few times per day if that. Not interested in pharmacotherapy at this time. Left arm comes and goes. No chest pain currently. No left arm pain currently. Past Medical History:   Diagnosis Date    Anxiety     Depression     Essential hypertension 10/10/2017    Jejunal intussusception ([facility] [address]     Nonintractable migraine 10/10/2017     No past surgical history on file.   Social History     Socioeconomic History    Marital status: Single     Spouse name: Not on file    Number of children: Not on file    Years of education: Not on file    Highest education level: Not on file   Occupational History    Not on file   Social Needs    Financial resource strain: Not on file    Food insecurity:     Worry: Not on file     Inability: Not on file    Transportation needs:     Medical: Not on file     Non-medical: Not on file   Tobacco Use    Smoking status: Current Every Day Smoker     Packs/day: 0.50     Years: 5.00     Pack years: 2.50     Last attempt to quit: 2017     Years since quittin.4    Smokeless tobacco: Never Used   Substance and Sexual Activity    Alcohol use: No    Drug use: No    Sexual activity: Not on file   Lifestyle    Physical activity:     Days per week: Not on file 98%   BMI 35.51 kg/m²     Physical Exam   Constitutional: He is oriented to person, place, and time. He appears well-developed and well-nourished. HENT:   Head: Normocephalic. Eyes: Pupils are equal, round, and reactive to light. Neck: No tracheal deviation present. Cardiovascular: Normal rate, regular rhythm and normal heart sounds. Exam reveals no gallop and no friction rub. No murmur heard. 2 + radial pulse bl    Pulmonary/Chest: No respiratory distress. Abdominal: Soft. Bowel sounds are normal. He exhibits no distension. There is no rebound. Musculoskeletal: He exhibits no edema. Neurological: He is oriented to person, place, and time. 5/5 diffuse muscle strength in left upper extremity  2+ triceps on left  1+ br and biceps on left   Small area of sensory loss over proximal dorsal forearm to light touch and prick. Negative tinel and phalen's sign    Skin: Skin is warm and dry. Assessment:       Diagnosis Orders   1. Essential hypertension  amLODIPine (NORVASC) 2.5 MG tablet   2. Class 2 severe obesity due to excess calories with serious comorbidity and body mass index (BMI) of 35.0 to 35.9 in adult ([facility] [address]     3. Tobacco use     4. Left arm pain  EMG    acetaminophen (TYLENOL) 500 MG tablet         Plan:   Continue chronic meds  He's working on tobacco and weight loss  Will get EMG and duplex artery left upper extremity (will have staff help me order)  Work on weight loss  Follow up in 6 weeks  Also having staff re-reach out stress test  If you don't hear about all 3 tests in 2 weeks call my office. (Including stress test)   Follow up in 6 weeks. If he has some scalene hypertrophy? CTS? Reason for different bp in both arms? Orders Placed This Encounter   Procedures    EMG     Standing Status:   Future     Standing Expiration Date:   7/27/2019     Order Specific Question:   Which body part?      Answer:   left upper extremity     Orders Placed This Encounter   Medications  amLODIPine (NORVASC) 2.5 MG tablet     Sig: Take 1 tablet by mouth daily     Dispense:  90 tablet     Refill:  1    acetaminophen (TYLENOL) 500 MG tablet     Sig: Take 1 tablet by mouth every 6 hours as needed for Pain     Dispense:  120 tablet     Refill:  1       Return in about 6 weeks (around 7/9/2019) for regularly scheduled appointment with PCP, worsening symptoms, call ASAP for appointment. Ashleigh Presley MD    If anything should change or worsen call ASAP, don't wait for next scheduled appointment.

## 2019-06-11 DIAGNOSIS — F41.9 ANXIETY: ICD-10-CM

## 2019-06-11 RX ORDER — BUSPIRONE HYDROCHLORIDE 7.5 MG/1
TABLET ORAL
Qty: 90 TABLET | Refills: 2 | Status: SHIPPED | OUTPATIENT
Start: 2019-06-11 | End: 2019-09-23

## 2019-08-12 ENCOUNTER — TELEPHONE (OUTPATIENT)
Dept: FAMILY MEDICINE CLINIC | Age: 29
End: 2019-08-12

## 2019-08-12 ENCOUNTER — OFFICE VISIT (OUTPATIENT)
Dept: FAMILY MEDICINE CLINIC | Age: 29
End: 2019-08-12
Payer: COMMERCIAL

## 2019-08-12 VITALS
OXYGEN SATURATION: 97 % | HEART RATE: 82 BPM | RESPIRATION RATE: 15 BRPM | HEIGHT: 66 IN | SYSTOLIC BLOOD PRESSURE: 132 MMHG | DIASTOLIC BLOOD PRESSURE: 94 MMHG | WEIGHT: 225 LBS | TEMPERATURE: 98.2 F | BODY MASS INDEX: 36.16 KG/M2

## 2019-08-12 DIAGNOSIS — E66.09 CLASS 2 OBESITY DUE TO EXCESS CALORIES WITHOUT SERIOUS COMORBIDITY WITH BODY MASS INDEX (BMI) OF 36.0 TO 36.9 IN ADULT: ICD-10-CM

## 2019-08-12 DIAGNOSIS — M54.2 NECK PAIN: ICD-10-CM

## 2019-08-12 DIAGNOSIS — M25.512 LEFT SHOULDER PAIN, UNSPECIFIED CHRONICITY: ICD-10-CM

## 2019-08-12 DIAGNOSIS — I10 ESSENTIAL HYPERTENSION: Primary | ICD-10-CM

## 2019-08-12 PROCEDURE — 99214 OFFICE O/P EST MOD 30 MIN: CPT | Performed by: INTERNAL MEDICINE

## 2019-08-12 PROCEDURE — G8427 DOCREV CUR MEDS BY ELIG CLIN: HCPCS | Performed by: INTERNAL MEDICINE

## 2019-08-12 PROCEDURE — G8417 CALC BMI ABV UP PARAM F/U: HCPCS | Performed by: INTERNAL MEDICINE

## 2019-08-12 PROCEDURE — 4004F PT TOBACCO SCREEN RCVD TLK: CPT | Performed by: INTERNAL MEDICINE

## 2019-08-12 ASSESSMENT — ENCOUNTER SYMPTOMS
ABDOMINAL PAIN: 0
EYE PAIN: 0
SHORTNESS OF BREATH: 0
BACK PAIN: 0

## 2019-08-12 NOTE — PROGRESS NOTES
(36.8 °C) (Tympanic)   Resp 15   Ht 5' 6\" (1.676 m)   Wt 225 lb (102.1 kg)   SpO2 97%   BMI 36.32 kg/m²     Physical Exam   Constitutional: He is oriented to person, place, and time. He appears well-developed and well-nourished. HENT:   Head: Normocephalic. Eyes: Pupils are equal, round, and reactive to light. Neck: No tracheal deviation present. Cardiovascular: Normal rate, regular rhythm and normal heart sounds. Exam reveals no gallop and no friction rub. No murmur heard. 2+ radial pulse    Pulmonary/Chest: No respiratory distress. Abdominal: Soft. Bowel sounds are normal. He exhibits no distension. There is no rebound. Musculoskeletal: He exhibits no edema. Left shoulder without erythema, warmth or swelling  Negative drop arm test on left  No tenderness over spinous processes over cervical spine  Pain with giron  Negative job's test  Negative spurling test   Neurological: He is oriented to person, place, and time. He displays normal reflexes. No sensory deficit. He exhibits normal muscle tone. Skin: Skin is warm and dry. Assessment:       Diagnosis Orders   1. Essential hypertension     2. Class 2 obesity due to excess calories without serious comorbidity with body mass index (BMI) of 36.0 to 36.9 in adult     3. Neck pain  XR CERVICAL SPINE (4-5 VIEWS)    XR SHOULDER LEFT (MIN 2 VIEWS)    Ambulatory referral to Physical Therapy    EMG   4. Left shoulder pain, unspecified chronicity  XR CERVICAL SPINE (4-5 VIEWS)    XR SHOULDER LEFT (MIN 2 VIEWS)    Ambulatory referral to Physical Therapy    EMG         Plan:    Patient gave me permission to speak in front of friends/family, etc.   Will pursue stress testing. (Risk of MI explained) If you get cp, dyspnea go to ER. Left neck/shoulder pain still  Also missed last 2 doses of amlodipine (restart and come in 5 to 7 days for bp check)   MA visit in 5 to 7 days for bp check. Likely MSK or radicular  Will start with PT.    Orders per

## 2019-09-23 ENCOUNTER — OFFICE VISIT (OUTPATIENT)
Dept: FAMILY MEDICINE CLINIC | Age: 29
End: 2019-09-23
Payer: COMMERCIAL

## 2019-09-23 ENCOUNTER — TELEPHONE (OUTPATIENT)
Dept: FAMILY MEDICINE CLINIC | Age: 29
End: 2019-09-23

## 2019-09-23 VITALS
TEMPERATURE: 97.9 F | HEART RATE: 88 BPM | OXYGEN SATURATION: 98 % | WEIGHT: 227 LBS | BODY MASS INDEX: 36.48 KG/M2 | RESPIRATION RATE: 16 BRPM | DIASTOLIC BLOOD PRESSURE: 86 MMHG | HEIGHT: 66 IN | SYSTOLIC BLOOD PRESSURE: 108 MMHG

## 2019-09-23 DIAGNOSIS — M25.512 CHRONIC LEFT SHOULDER PAIN: Primary | ICD-10-CM

## 2019-09-23 DIAGNOSIS — F41.9 ANXIETY: ICD-10-CM

## 2019-09-23 DIAGNOSIS — I10 ESSENTIAL HYPERTENSION: ICD-10-CM

## 2019-09-23 DIAGNOSIS — G89.29 CHRONIC LEFT SHOULDER PAIN: Primary | ICD-10-CM

## 2019-09-23 PROCEDURE — G8427 DOCREV CUR MEDS BY ELIG CLIN: HCPCS | Performed by: INTERNAL MEDICINE

## 2019-09-23 PROCEDURE — G8417 CALC BMI ABV UP PARAM F/U: HCPCS | Performed by: INTERNAL MEDICINE

## 2019-09-23 PROCEDURE — 4004F PT TOBACCO SCREEN RCVD TLK: CPT | Performed by: INTERNAL MEDICINE

## 2019-09-23 PROCEDURE — 99214 OFFICE O/P EST MOD 30 MIN: CPT | Performed by: INTERNAL MEDICINE

## 2019-09-23 RX ORDER — AMLODIPINE BESYLATE 5 MG/1
5 TABLET ORAL DAILY
Qty: 14 TABLET | Refills: 0 | Status: SHIPPED | OUTPATIENT
Start: 2019-09-23 | End: 2019-09-27 | Stop reason: DRUGHIGH

## 2019-09-23 RX ORDER — ESCITALOPRAM OXALATE 10 MG/1
10 TABLET ORAL DAILY
Qty: 30 TABLET | Refills: 1 | Status: SHIPPED | OUTPATIENT
Start: 2019-09-23 | End: 2020-04-06 | Stop reason: CLARIF

## 2019-09-23 ASSESSMENT — ENCOUNTER SYMPTOMS
EYE PAIN: 0
SHORTNESS OF BREATH: 0
BACK PAIN: 0
ABDOMINAL PAIN: 0

## 2019-09-23 NOTE — PROGRESS NOTES
Subjective:      Patient ID: Susi Kellogg is a 34 y.o. male who presents today with:  Chief Complaint   Patient presents with    Follow-up    Hypertension    Anxiety    Neck Pain    Shoulder Pain       HPI     Anxiety-Chrnic, uncontrolled, stopped buspirone 2/2 to side effects. Viibrid not covered past starter pack per patient. No si/hi. Hypertension-Chronic, essential. Not compliant with low sodium diet. Known obesity. Male Sex. Compliant with therapy. Improved with amlodipine 2.5 mgonc edialy. Chronic shoulder and mostly left shoulder pain-Hasn't starte pt yet and then needs EMG. NO FALLS, no trauma, no new symptoms. Past Medical History:   Diagnosis Date    Anxiety     Depression     Essential hypertension 10/10/2017    Jejunal intussusception ([facility] [address]     Nonintractable migraine 10/10/2017     No past surgical history on file.   Social History     Socioeconomic History    Marital status:      Spouse name: Not on file    Number of children: Not on file    Years of education: Not on file    Highest education level: Not on file   Occupational History    Not on file   Social Needs    Financial resource strain: Not on file    Food insecurity:     Worry: Not on file     Inability: Not on file    Transportation needs:     Medical: Not on file     Non-medical: Not on file   Tobacco Use    Smoking status: Current Every Day Smoker     Packs/day: 0.50     Years: 5.00     Pack years: 2.50     Last attempt to quit: 2017     Years since quittin.7    Smokeless tobacco: Never Used   Substance and Sexual Activity    Alcohol use: No    Drug use: No    Sexual activity: Not on file   Lifestyle    Physical activity:     Days per week: Not on file     Minutes per session: Not on file    Stress: Not on file   Relationships    Social connections:     Talks on phone: Not on file     Gets together: Not on file     Attends Episcopal service: Not on file     Active member of club or

## 2019-09-27 ENCOUNTER — TELEPHONE (OUTPATIENT)
Dept: FAMILY MEDICINE CLINIC | Age: 29
End: 2019-09-27

## 2019-09-27 ENCOUNTER — NURSE ONLY (OUTPATIENT)
Dept: FAMILY MEDICINE CLINIC | Age: 29
End: 2019-09-27

## 2019-09-27 VITALS — DIASTOLIC BLOOD PRESSURE: 82 MMHG | SYSTOLIC BLOOD PRESSURE: 118 MMHG

## 2019-09-27 RX ORDER — AMLODIPINE BESYLATE 2.5 MG/1
2.5 TABLET ORAL DAILY
Qty: 90 TABLET | Refills: 0 | Status: SHIPPED | OUTPATIENT
Start: 2019-09-27 | End: 2020-03-24

## 2019-10-23 ENCOUNTER — TELEPHONE (OUTPATIENT)
Dept: FAMILY MEDICINE CLINIC | Age: 29
End: 2019-10-23

## 2019-10-23 ENCOUNTER — OFFICE VISIT (OUTPATIENT)
Dept: FAMILY MEDICINE CLINIC | Age: 29
End: 2019-10-23
Payer: COMMERCIAL

## 2019-10-23 VITALS
DIASTOLIC BLOOD PRESSURE: 80 MMHG | BODY MASS INDEX: 36.48 KG/M2 | RESPIRATION RATE: 15 BRPM | HEART RATE: 80 BPM | WEIGHT: 227 LBS | TEMPERATURE: 98.3 F | OXYGEN SATURATION: 99 % | HEIGHT: 66 IN | SYSTOLIC BLOOD PRESSURE: 110 MMHG

## 2019-10-23 DIAGNOSIS — Z23 ENCOUNTER FOR IMMUNIZATION: ICD-10-CM

## 2019-10-23 DIAGNOSIS — R07.9 CHEST PAIN, UNSPECIFIED TYPE: ICD-10-CM

## 2019-10-23 DIAGNOSIS — G89.29 CHRONIC LEFT SHOULDER PAIN: ICD-10-CM

## 2019-10-23 DIAGNOSIS — E66.09 CLASS 2 OBESITY DUE TO EXCESS CALORIES WITHOUT SERIOUS COMORBIDITY WITH BODY MASS INDEX (BMI) OF 36.0 TO 36.9 IN ADULT: ICD-10-CM

## 2019-10-23 DIAGNOSIS — I10 ESSENTIAL HYPERTENSION: Primary | ICD-10-CM

## 2019-10-23 DIAGNOSIS — F41.9 ANXIETY: ICD-10-CM

## 2019-10-23 DIAGNOSIS — M25.512 CHRONIC LEFT SHOULDER PAIN: ICD-10-CM

## 2019-10-23 DIAGNOSIS — I10 ESSENTIAL HYPERTENSION: ICD-10-CM

## 2019-10-23 LAB
ALBUMIN SERPL-MCNC: 4.7 G/DL (ref 3.5–4.6)
ALP BLD-CCNC: 20 U/L (ref 35–104)
ALT SERPL-CCNC: 19 U/L (ref 0–41)
ANION GAP SERPL CALCULATED.3IONS-SCNC: 14 MEQ/L (ref 9–15)
AST SERPL-CCNC: 17 U/L (ref 0–40)
BASOPHILS ABSOLUTE: 0.1 K/UL (ref 0–0.2)
BASOPHILS RELATIVE PERCENT: 1 %
BILIRUB SERPL-MCNC: 0.3 MG/DL (ref 0.2–0.7)
BUN BLDV-MCNC: 13 MG/DL (ref 6–20)
CALCIUM SERPL-MCNC: 9.4 MG/DL (ref 8.5–9.9)
CHLORIDE BLD-SCNC: 100 MEQ/L (ref 95–107)
CHOLESTEROL, TOTAL: 155 MG/DL (ref 0–199)
CO2: 26 MEQ/L (ref 20–31)
CREAT SERPL-MCNC: 0.72 MG/DL (ref 0.7–1.2)
EOSINOPHILS ABSOLUTE: 0.1 K/UL (ref 0–0.7)
EOSINOPHILS RELATIVE PERCENT: 1.1 %
GFR AFRICAN AMERICAN: >60
GFR NON-AFRICAN AMERICAN: >60
GLOBULIN: 3 G/DL (ref 2.3–3.5)
GLUCOSE BLD-MCNC: 92 MG/DL (ref 70–99)
HBA1C MFR BLD: 5.9 % (ref 4.8–5.9)
HCT VFR BLD CALC: 44.5 % (ref 42–52)
HDLC SERPL-MCNC: 33 MG/DL (ref 40–59)
HEMOGLOBIN: 14.7 G/DL (ref 14–18)
LDL CHOLESTEROL CALCULATED: 104 MG/DL (ref 0–129)
LYMPHOCYTES ABSOLUTE: 2 K/UL (ref 1–4.8)
LYMPHOCYTES RELATIVE PERCENT: 25.1 %
MCH RBC QN AUTO: 27.7 PG (ref 27–31.3)
MCHC RBC AUTO-ENTMCNC: 33 % (ref 33–37)
MCV RBC AUTO: 83.9 FL (ref 80–100)
MONOCYTES ABSOLUTE: 0.8 K/UL (ref 0.2–0.8)
MONOCYTES RELATIVE PERCENT: 9.6 %
NEUTROPHILS ABSOLUTE: 5 K/UL (ref 1.4–6.5)
NEUTROPHILS RELATIVE PERCENT: 63.2 %
PDW BLD-RTO: 14.3 % (ref 11.5–14.5)
PLATELET # BLD: 393 K/UL (ref 130–400)
POTASSIUM SERPL-SCNC: 4.1 MEQ/L (ref 3.4–4.9)
RBC # BLD: 5.3 M/UL (ref 4.7–6.1)
SODIUM BLD-SCNC: 140 MEQ/L (ref 135–144)
TOTAL PROTEIN: 7.7 G/DL (ref 6.3–8)
TRIGL SERPL-MCNC: 90 MG/DL (ref 0–150)
TSH REFLEX: 2.28 UIU/ML (ref 0.44–3.86)
VITAMIN D 25-HYDROXY: 14.3 NG/ML (ref 30–100)
WBC # BLD: 7.9 K/UL (ref 4.8–10.8)

## 2019-10-23 PROCEDURE — 4004F PT TOBACCO SCREEN RCVD TLK: CPT | Performed by: INTERNAL MEDICINE

## 2019-10-23 PROCEDURE — G8417 CALC BMI ABV UP PARAM F/U: HCPCS | Performed by: INTERNAL MEDICINE

## 2019-10-23 PROCEDURE — 90471 IMMUNIZATION ADMIN: CPT | Performed by: INTERNAL MEDICINE

## 2019-10-23 PROCEDURE — 90688 IIV4 VACCINE SPLT 0.5 ML IM: CPT | Performed by: INTERNAL MEDICINE

## 2019-10-23 PROCEDURE — G8482 FLU IMMUNIZE ORDER/ADMIN: HCPCS | Performed by: INTERNAL MEDICINE

## 2019-10-23 PROCEDURE — G8427 DOCREV CUR MEDS BY ELIG CLIN: HCPCS | Performed by: INTERNAL MEDICINE

## 2019-10-23 PROCEDURE — 99214 OFFICE O/P EST MOD 30 MIN: CPT | Performed by: INTERNAL MEDICINE

## 2019-10-23 ASSESSMENT — ENCOUNTER SYMPTOMS
BACK PAIN: 0
ABDOMINAL PAIN: 0
EYE PAIN: 0
SHORTNESS OF BREATH: 0

## 2019-10-28 DIAGNOSIS — E55.9 VITAMIN D DEFICIENCY: Primary | ICD-10-CM

## 2019-10-28 RX ORDER — ERGOCALCIFEROL 1.25 MG/1
50000 CAPSULE ORAL WEEKLY
Qty: 12 CAPSULE | Refills: 0 | Status: SHIPPED | OUTPATIENT
Start: 2019-10-28 | End: 2020-04-06 | Stop reason: CLARIF

## 2020-01-15 ENCOUNTER — OFFICE VISIT (OUTPATIENT)
Dept: FAMILY MEDICINE CLINIC | Age: 30
End: 2020-01-15
Payer: COMMERCIAL

## 2020-01-15 VITALS
TEMPERATURE: 98.1 F | HEIGHT: 66 IN | DIASTOLIC BLOOD PRESSURE: 88 MMHG | BODY MASS INDEX: 36.8 KG/M2 | SYSTOLIC BLOOD PRESSURE: 122 MMHG | HEART RATE: 94 BPM | OXYGEN SATURATION: 98 % | WEIGHT: 229 LBS

## 2020-01-15 PROCEDURE — 96372 THER/PROPH/DIAG INJ SC/IM: CPT | Performed by: FAMILY MEDICINE

## 2020-01-15 PROCEDURE — G8427 DOCREV CUR MEDS BY ELIG CLIN: HCPCS | Performed by: FAMILY MEDICINE

## 2020-01-15 PROCEDURE — G8482 FLU IMMUNIZE ORDER/ADMIN: HCPCS | Performed by: FAMILY MEDICINE

## 2020-01-15 PROCEDURE — G8417 CALC BMI ABV UP PARAM F/U: HCPCS | Performed by: FAMILY MEDICINE

## 2020-01-15 PROCEDURE — 99214 OFFICE O/P EST MOD 30 MIN: CPT | Performed by: FAMILY MEDICINE

## 2020-01-15 PROCEDURE — 4004F PT TOBACCO SCREEN RCVD TLK: CPT | Performed by: FAMILY MEDICINE

## 2020-01-15 RX ORDER — ACETAMINOPHEN 500 MG
500 TABLET ORAL EVERY 6 HOURS PRN
Qty: 120 TABLET | Refills: 5 | Status: SHIPPED | OUTPATIENT
Start: 2020-01-15 | End: 2021-07-20 | Stop reason: CLARIF

## 2020-01-15 RX ORDER — METHYLPREDNISOLONE ACETATE 80 MG/ML
80 INJECTION, SUSPENSION INTRA-ARTICULAR; INTRALESIONAL; INTRAMUSCULAR; SOFT TISSUE ONCE
Status: COMPLETED | OUTPATIENT
Start: 2020-01-15 | End: 2020-01-15

## 2020-01-15 RX ADMIN — METHYLPREDNISOLONE ACETATE 80 MG: 80 INJECTION, SUSPENSION INTRA-ARTICULAR; INTRALESIONAL; INTRAMUSCULAR; SOFT TISSUE at 13:05

## 2020-01-15 NOTE — PROGRESS NOTES
Chief Complaint   Patient presents with    Back Pain     pt has back pain towards his Rt side , pt was seen with [facility] , he was prescribed flexeril that did not help        HPI: Srinivasan Rivas 34 y.o. male presenting for     Back Pain  Patient presents for evaluation of low back problems. Symptoms have been present for 3 days  and include pain in left lower back  (aching and throbbing in character; 8/10 in severity). 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 . Symptoms are worse with movement. Alleviating factors identifiable by the patient are none. Aggravating factors identifiable by the patient are bending backwards, bending forwards and bending sideways. Treatments initiated by the patient: muscle relaxants, NSAIDS. Previous lower back problems: none. Previous work up: none. Previous treatments: Muscle relaxants and NSAIDs. [place] NSAIDs hurt his stomach. Current Outpatient Medications   Medication Sig Dispense Refill    acetaminophen (TYLENOL) 500 MG tablet Take 1 tablet by mouth every 6 hours as needed for Pain 120 tablet 5    vitamin D (ERGOCALCIFEROL) 1.25 MG (28398 UT) CAPS capsule Take 1 capsule by mouth once a week 12 capsule 0    amLODIPine (NORVASC) 2.5 MG tablet Take 1 tablet by mouth daily 90 tablet 0    escitalopram (LEXAPRO) 10 MG tablet Take 1 tablet by mouth daily 30 tablet 1    ranitidine (ZANTAC) 150 MG tablet Take 1 tablet by mouth 2 times daily 60 tablet 1     No current facility-administered medications for this visit. ROS  CONSTITUTIONAL: The patient denies fevers, chills, sweats and body ache. HEENT: Denies headache, blurry vision, eye pain, tinnitus, vertigo,  sore throat, neck or thyroid masses. RESPIRATORY: Denies cough, sputum, hemoptysis. CARDIAC: Denies chest pain, pressure, palpitations, Denies lower extremity edema.   GASTROINTESTINAL: Denies abdominal pain, constipation, diarrhea, bleeding in the stools,   GENITOURINARY: Denies dysuria, hematuria, nocturia or frequency, urinary incontinence. NEUROLOGIC: Denies headaches, dizziness, syncope, weakness  MUSCULOSKELETAL: Reports lower back pain. ENDOCRINOLOGY: Denies heat or cold intolerance. HEMATOLOGY: Denies easy bleeding or blood transfusion,anemia  DERMATOLOGY: Denies changes in moles or pigmentation changes. PSYCHIATRY: Denies depression, agitation or anxiety. Past Medical History:   Diagnosis Date    Anxiety     Depression     Essential hypertension 10/10/2017    Jejunal intussusception ([facility] [address]     Nonintractable migraine 10/10/2017        No past surgical history on file.      Family History   Problem Relation Age of Onset    High Blood Pressure Father     Mental Illness Father     Other Father         gout        Social History     Socioeconomic History    Marital status:      Spouse name: Not on file    Number of children: Not on file    Years of education: Not on file    Highest education level: Not on file   Occupational History    Not on file   Social Needs    Financial resource strain: Not on file    Food insecurity:     Worry: Not on file     Inability: Not on file    Transportation needs:     Medical: Not on file     Non-medical: Not on file   Tobacco Use    Smoking status: Current Every Day Smoker     Packs/day: 0.50     Years: 5.00     Pack years: 2.50     Last attempt to quit: 2017     Years since quittin.0    Smokeless tobacco: Never Used   Substance and Sexual Activity    Alcohol use: No    Drug use: No    Sexual activity: Not on file   Lifestyle    Physical activity:     Days per week: Not on file     Minutes per session: Not on file    Stress: Not on file   Relationships    Social connections:     Talks on phone: Not on file     Gets together: Not on file     Attends Yazidi service: Not on file     Active member of club or organization: Not on file     Attends meetings of clubs or organizations: Not on file     Relationship status: Not on

## 2020-01-15 NOTE — PATIENT INSTRUCTIONS
and sign in to your [employer] account. Enter D850 in the [employer] box to learn more about \"Back Stretches: Exercises. \"     If you do not have an account, please click on the \"Sign Up Now\" link. Current as of: June 26, 2019  Content Version: 12.3  © 1013-1625 [employer]. Care instructions adapted under license by [facility] ([facility]). If you have questions about a medical condition or this instruction, always ask your healthcare professional. [address] any warranty or liability for your use of this information.

## 2020-03-24 ENCOUNTER — VIRTUAL VISIT (OUTPATIENT)
Dept: FAMILY MEDICINE CLINIC | Age: 30
End: 2020-03-24
Payer: COMMERCIAL

## 2020-03-24 PROCEDURE — G8427 DOCREV CUR MEDS BY ELIG CLIN: HCPCS | Performed by: INTERNAL MEDICINE

## 2020-03-24 PROCEDURE — 99214 OFFICE O/P EST MOD 30 MIN: CPT | Performed by: INTERNAL MEDICINE

## 2020-03-24 RX ORDER — FEXOFENADINE HCL 180 MG/1
180 TABLET ORAL DAILY
Qty: 30 TABLET | Refills: 1 | Status: SHIPPED | OUTPATIENT
Start: 2020-03-24 | End: 2020-03-24

## 2020-03-24 RX ORDER — VILAZODONE HYDROCHLORIDE 10 MG/1
10 TABLET ORAL DAILY
Qty: 14 TABLET | Refills: 0 | Status: SHIPPED | OUTPATIENT
Start: 2020-03-24 | End: 2020-04-06 | Stop reason: DRUGHIGH

## 2020-03-24 RX ORDER — FLUTICASONE PROPIONATE 50 MCG
1 SPRAY, SUSPENSION (ML) NASAL DAILY
Qty: 1 BOTTLE | Refills: 1 | Status: SHIPPED | OUTPATIENT
Start: 2020-03-24 | End: 2020-04-06 | Stop reason: CLARIF

## 2020-03-24 RX ORDER — AMLODIPINE BESYLATE 2.5 MG/1
2.5 TABLET ORAL DAILY
Qty: 90 TABLET | Refills: 3 | Status: SHIPPED | OUTPATIENT
Start: 2020-03-24 | End: 2020-12-24 | Stop reason: SDUPTHER

## 2020-03-24 RX ORDER — FEXOFENADINE HCL 180 MG/1
180 TABLET ORAL DAILY
Qty: 30 TABLET | Refills: 1 | Status: SHIPPED | OUTPATIENT
Start: 2020-03-24 | End: 2020-04-06 | Stop reason: CLARIF

## 2020-03-24 RX ORDER — FLUTICASONE PROPIONATE 50 MCG
1 SPRAY, SUSPENSION (ML) NASAL DAILY
Qty: 1 BOTTLE | Refills: 1 | Status: SHIPPED | OUTPATIENT
Start: 2020-03-24 | End: 2020-03-24

## 2020-03-24 RX ORDER — AMLODIPINE BESYLATE 2.5 MG/1
2.5 TABLET ORAL DAILY
Qty: 90 TABLET | Refills: 3 | Status: SHIPPED | OUTPATIENT
Start: 2020-03-24 | End: 2020-03-24

## 2020-03-24 ASSESSMENT — ENCOUNTER SYMPTOMS
EYE PAIN: 0
SORE THROAT: 0
ABDOMINAL PAIN: 0
SHORTNESS OF BREATH: 0
SINUS PAIN: 0
SINUS PRESSURE: 0
BACK PAIN: 0

## 2020-03-24 NOTE — PROGRESS NOTES
Subjective:      Patient ID: Marc Day is a 34 y.o. male who presents today with:  No chief complaint on file. HPI    Cough and \"runny nose\". Cough only in the morning when he first wakes up. \"mucesey\". No fevers or chills. Anxiety as well. He mentions he will get short of breath only when he is anxious. No chest pains. No si/hi. Hypertension-Chronic, essential. Not compliant with low sodium diet. Known obesity. Male Sex. Compliant with therapy. Improved with amlodipine. He is not taking \"anything\" for anxiety. Felt groggy on lexapro. Used to be on buspar. (same side effect)   Past Medical History:   Diagnosis Date    Anxiety     Depression     Essential hypertension 10/10/2017    Jejunal intussusception ([facility] [address]     Nonintractable migraine 10/10/2017     No past surgical history on file.   Social History     Socioeconomic History    Marital status:      Spouse name: Not on file    Number of children: Not on file    Years of education: Not on file    Highest education level: Not on file   Occupational History    Not on file   Social Needs    Financial resource strain: Not on file    Food insecurity     Worry: Not on file     Inability: Not on file    Transportation needs     Medical: Not on file     Non-medical: Not on file   Tobacco Use    Smoking status: Current Every Day Smoker     Packs/day: 0.50     Years: 5.00     Pack years: 2.50     Last attempt to quit: 2017     Years since quittin.2    Smokeless tobacco: Never Used   Substance and Sexual Activity    Alcohol use: No    Drug use: No    Sexual activity: Not on file   Lifestyle    Physical activity     Days per week: Not on file     Minutes per session: Not on file    Stress: Not on file   Relationships    Social connections     Talks on phone: Not on file     Gets together: Not on file     Attends Yarsanism service: Not on file     Active member of club or organization: Not on file     Attends meetings of clubs or 3/24/2021    Vitamin D 25 Hydroxy     Standing Status:   Future     Standing Expiration Date:   3/24/2021    CBC Auto Differential     Standing Status:   Future     Standing Expiration Date:   3/24/2021    Comprehensive Metabolic Panel     Standing Status:   Future     Standing Expiration Date:   3/24/2021    Hemoglobin A1C     Standing Status:   Future     Standing Expiration Date:   3/24/2021    Lipid Panel     Standing Status:   Future     Standing Expiration Date:   3/24/2021     Order Specific Question:   Is Patient Fasting?/# of Hours     Answer:   10     Orders Placed This Encounter   Medications    DISCONTD: fexofenadine (ALLEGRA) 180 MG tablet     Sig: Take 1 tablet by mouth daily     Dispense:  30 tablet     Refill:  1    DISCONTD: fluticasone (FLONASE) 50 MCG/ACT nasal spray     Si spray by Each Nostril route daily     Dispense:  1 Bottle     Refill:  1    DISCONTD: amLODIPine (NORVASC) 2.5 MG tablet     Sig: Take 1 tablet by mouth daily     Dispense:  90 tablet     Refill:  3    amLODIPine (NORVASC) 2.5 MG tablet     Sig: Take 1 tablet by mouth daily     Dispense:  90 tablet     Refill:  3    fexofenadine (ALLEGRA) 180 MG tablet     Sig: Take 1 tablet by mouth daily     Dispense:  30 tablet     Refill:  1    fluticasone (FLONASE) 50 MCG/ACT nasal spray     Si spray by Each Nostril route daily     Dispense:  1 Bottle     Refill:  1    vilazodone HCl (VILAZODONE HCL) 10 MG TABS     Sig: Take 1 tablet by mouth daily for 14 days     Dispense:  14 tablet     Refill:  0       No follow-ups on file.       Roslyn Boss MD

## 2020-04-03 ENCOUNTER — E-VISIT (OUTPATIENT)
Dept: FAMILY MEDICINE CLINIC | Age: 30
End: 2020-04-03

## 2020-04-06 ENCOUNTER — VIRTUAL VISIT (OUTPATIENT)
Dept: FAMILY MEDICINE CLINIC | Age: 30
End: 2020-04-06
Payer: COMMERCIAL

## 2020-04-06 ENCOUNTER — TELEPHONE (OUTPATIENT)
Dept: FAMILY MEDICINE CLINIC | Age: 30
End: 2020-04-06

## 2020-04-06 PROCEDURE — G8427 DOCREV CUR MEDS BY ELIG CLIN: HCPCS | Performed by: INTERNAL MEDICINE

## 2020-04-06 PROCEDURE — 99214 OFFICE O/P EST MOD 30 MIN: CPT | Performed by: INTERNAL MEDICINE

## 2020-04-06 RX ORDER — GUAIFENESIN 600 MG/1
600 TABLET, EXTENDED RELEASE ORAL 2 TIMES DAILY
Qty: 30 TABLET | Refills: 0 | Status: SHIPPED | OUTPATIENT
Start: 2020-04-06 | End: 2020-04-21

## 2020-04-06 RX ORDER — VILAZODONE HYDROCHLORIDE 20 MG/1
20 TABLET ORAL DAILY
Qty: 30 TABLET | Refills: 1 | Status: SHIPPED | OUTPATIENT
Start: 2020-04-06 | End: 2020-04-20 | Stop reason: CLARIF

## 2020-04-06 RX ORDER — FEXOFENADINE HCL 180 MG/1
180 TABLET ORAL DAILY
Qty: 30 TABLET | Refills: 0 | Status: SHIPPED | OUTPATIENT
Start: 2020-04-06 | End: 2020-05-06

## 2020-04-06 RX ORDER — FLUTICASONE PROPIONATE 50 MCG
1 SPRAY, SUSPENSION (ML) NASAL DAILY
Qty: 1 BOTTLE | Refills: 0 | Status: SHIPPED | OUTPATIENT
Start: 2020-04-06 | End: 2021-07-20 | Stop reason: CLARIF

## 2020-04-06 ASSESSMENT — ENCOUNTER SYMPTOMS
ABDOMINAL PAIN: 0
BACK PAIN: 0
SHORTNESS OF BREATH: 0
EYE PAIN: 0

## 2020-04-06 NOTE — TELEPHONE ENCOUNTER
Please prepare a letter stating he has anxiety and depression and is being treated by me. Prepare letter and then contact him with where he wants it sent to.

## 2020-04-06 NOTE — PROGRESS NOTES
Subjective:      Patient ID: Joel Acevedo is a 34 y.o. male who presents today with:  No chief complaint on file. HPI        Anxiety-Chronic. On Viibrid. Feels a little better, mentions some fatigue from it. He mentions it has been helping with his anxiety. Mentions feels like a sinus headache 5 minutes before his call. No fevers. No falls, no trauma. Not worst head ache of your life. Swallowing okay. Tylenol does help. No fevers, no chills. Past Medical History:   Diagnosis Date    Anxiety     Depression     Essential hypertension 10/10/2017    Jejunal intussusception ([facility] [address]     Nonintractable migraine 10/10/2017     No past surgical history on file.   Social History     Socioeconomic History    Marital status:      Spouse name: Not on file    Number of children: Not on file    Years of education: Not on file    Highest education level: Not on file   Occupational History    Not on file   Social Needs    Financial resource strain: Not on file    Food insecurity     Worry: Not on file     Inability: Not on file    Transportation needs     Medical: Not on file     Non-medical: Not on file   Tobacco Use    Smoking status: Current Every Day Smoker     Packs/day: 0.50     Years: 5.00     Pack years: 2.50     Last attempt to quit: 2017     Years since quittin.2    Smokeless tobacco: Never Used   Substance and Sexual Activity    Alcohol use: No    Drug use: No    Sexual activity: Not on file   Lifestyle    Physical activity     Days per week: Not on file     Minutes per session: Not on file    Stress: Not on file   Relationships    Social connections     Talks on phone: Not on file     Gets together: Not on file     Attends Restorationism service: Not on file     Active member of club or organization: Not on file     Attends meetings of clubs or organizations: Not on file     Relationship status: Not on file    Intimate partner violence     Fear of current or ex partner: Not on Plan:   No SI/HI  Mild depression. Given gene sight results would either stick with off label use of viibrid vs switching to pristique  Sinus symptoms started 5 minutes before I called him  If they don't improve call me, call asap if gets worse or any fevers, etc.   He mentions some headache tinnitus recommends in office visit this week  Feels \"more sinusey\"  Will add flonase he never took it and mucinex (he never took them from last visit)   If it's not better get in to be seen asap. To ER if it worsen. Video visit done because of coronavirus pandemic. Visit done via doxy. me   Telephone visit done because of coronavirus pandemic.   explained billeable visit, he understands and agrees to continue  I was at home and patient was at home during this visit. Understands SSI can increase SI and to call immediately if this should occur. Never completed stress test from last year. (risk of MI)   emg not completed   Duplex arterial not done   Labs after pandemic. Pneumovax 23 to pharmacy. No orders of the defined types were placed in this encounter. Orders Placed This Encounter   Medications    fluticasone (FLONASE) 50 MCG/ACT nasal spray     Si spray by Nasal route daily     Dispense:  1 Bottle     Refill:  0    fexofenadine (ALLEGRA) 180 MG tablet     Sig: Take 1 tablet by mouth daily     Dispense:  30 tablet     Refill:  0    guaiFENesin (MUCINEX) 600 MG extended release tablet     Sig: Take 1 tablet by mouth 2 times daily for 15 days     Dispense:  30 tablet     Refill:  0    vilazodone HCl (VIIBRYD) 20 MG TABS     Sig: Take 1 tablet by mouth daily     Dispense:  30 tablet     Refill:  1       Return in about 6 weeks (around 2020) for Chronic condition management/appointment, worsening symptoms, call ASAP for appointment. Guilherme Santoyo MD    If anything should change or worsen call ASAP, don't wait for next scheduled appointment.

## 2020-04-08 RX ORDER — DESVENLAFAXINE 25 MG/1
25 TABLET, EXTENDED RELEASE ORAL DAILY
Qty: 30 TABLET | Refills: 0 | Status: SHIPPED | OUTPATIENT
Start: 2020-04-08 | End: 2020-06-22

## 2020-04-09 ENCOUNTER — TELEPHONE (OUTPATIENT)
Dept: ADMINISTRATIVE | Age: 30
End: 2020-04-09

## 2020-04-09 NOTE — TELEPHONE ENCOUNTER
Patient has ringing in his ears and wanted to know if he was able to have an office appt instead of a video/telephone appointment. Please advise and contact the patient if this would be okay.

## 2020-04-10 ENCOUNTER — OFFICE VISIT (OUTPATIENT)
Dept: FAMILY MEDICINE CLINIC | Age: 30
End: 2020-04-10
Payer: COMMERCIAL

## 2020-04-10 VITALS
HEIGHT: 66 IN | SYSTOLIC BLOOD PRESSURE: 124 MMHG | RESPIRATION RATE: 18 BRPM | HEART RATE: 98 BPM | WEIGHT: 221 LBS | OXYGEN SATURATION: 98 % | TEMPERATURE: 98.5 F | BODY MASS INDEX: 35.52 KG/M2 | DIASTOLIC BLOOD PRESSURE: 88 MMHG

## 2020-04-10 PROCEDURE — 99213 OFFICE O/P EST LOW 20 MIN: CPT | Performed by: NURSE PRACTITIONER

## 2020-04-10 PROCEDURE — G8427 DOCREV CUR MEDS BY ELIG CLIN: HCPCS | Performed by: NURSE PRACTITIONER

## 2020-04-10 PROCEDURE — 4004F PT TOBACCO SCREEN RCVD TLK: CPT | Performed by: NURSE PRACTITIONER

## 2020-04-10 PROCEDURE — G8417 CALC BMI ABV UP PARAM F/U: HCPCS | Performed by: NURSE PRACTITIONER

## 2020-04-10 RX ORDER — CITALOPRAM 10 MG/1
10 TABLET ORAL DAILY
Qty: 30 TABLET | Refills: 0 | Status: SHIPPED | OUTPATIENT
Start: 2020-04-10 | End: 2020-05-04 | Stop reason: SDUPTHER

## 2020-04-10 ASSESSMENT — ENCOUNTER SYMPTOMS
SINUS PAIN: 0
BACK PAIN: 0
SHORTNESS OF BREATH: 0
SINUS PRESSURE: 0
COUGH: 0
SORE THROAT: 0
WHEEZING: 0
RHINORRHEA: 1

## 2020-04-13 ENCOUNTER — VIRTUAL VISIT (OUTPATIENT)
Dept: FAMILY MEDICINE CLINIC | Age: 30
End: 2020-04-13
Payer: COMMERCIAL

## 2020-04-13 PROCEDURE — G8428 CUR MEDS NOT DOCUMENT: HCPCS | Performed by: FAMILY MEDICINE

## 2020-04-13 PROCEDURE — 99442 PR PHYS/QHP TELEPHONE EVALUATION 11-20 MIN: CPT | Performed by: FAMILY MEDICINE

## 2020-04-13 RX ORDER — ESOMEPRAZOLE MAGNESIUM 40 MG/1
40 CAPSULE, DELAYED RELEASE ORAL DAILY
Qty: 30 CAPSULE | Refills: 3 | Status: SHIPPED | OUTPATIENT
Start: 2020-04-13 | End: 2020-05-29 | Stop reason: ALTCHOICE

## 2020-04-13 RX ORDER — ALPRAZOLAM 0.25 MG/1
TABLET ORAL
Qty: 30 TABLET | Refills: 0 | Status: SHIPPED | OUTPATIENT
Start: 2020-04-13 | End: 2020-09-14

## 2020-04-13 ASSESSMENT — ENCOUNTER SYMPTOMS
RESPIRATORY NEGATIVE: 1
EYES NEGATIVE: 1
ALLERGIC/IMMUNOLOGIC NEGATIVE: 1
HEARTBURN: 1
SHORTNESS OF BREATH: 0

## 2020-04-20 ENCOUNTER — TELEMEDICINE (OUTPATIENT)
Dept: FAMILY MEDICINE CLINIC | Age: 30
End: 2020-04-20
Payer: COMMERCIAL

## 2020-04-20 ENCOUNTER — TELEPHONE (OUTPATIENT)
Dept: FAMILY MEDICINE CLINIC | Age: 30
End: 2020-04-20

## 2020-04-20 PROCEDURE — G8427 DOCREV CUR MEDS BY ELIG CLIN: HCPCS | Performed by: INTERNAL MEDICINE

## 2020-04-20 PROCEDURE — 99214 OFFICE O/P EST MOD 30 MIN: CPT | Performed by: INTERNAL MEDICINE

## 2020-04-20 RX ORDER — FAMOTIDINE 40 MG/1
TABLET, FILM COATED ORAL
Qty: 90 TABLET | Refills: 0 | Status: SHIPPED | OUTPATIENT
Start: 2020-04-20 | End: 2020-07-14

## 2020-04-20 ASSESSMENT — ENCOUNTER SYMPTOMS
EYE PAIN: 0
SHORTNESS OF BREATH: 0
ABDOMINAL PAIN: 0
BACK PAIN: 0

## 2020-04-20 NOTE — PROGRESS NOTES
Not on file     Active member of club or organization: Not on file     Attends meetings of clubs or organizations: Not on file     Relationship status: Not on file    Intimate partner violence     Fear of current or ex partner: Not on file     Emotionally abused: Not on file     Physically abused: Not on file     Forced sexual activity: Not on file   Other Topics Concern    Not on file   Social History Narrative    Not on file     Allergies   Allergen Reactions    Amoxil [Amoxicillin]      unknow to pt happens happens when he takes it   [place] Buspar [Buspirone]      Groggy     Lexapro [Escitalopram Oxalate]      groggy    Zoloft [Sertraline Hcl]      Lost effectiveness      Current Outpatient Medications on File Prior to Visit   Medication Sig Dispense Refill    esomeprazole ([address]) 40 MG delayed release capsule Take 1 capsule by mouth daily 30 capsule 3    ALPRAZolam (XANAX) 0.25 MG tablet Take 1 tab po once daily as needed for anxiety 30 tablet 0    citalopram (CELEXA) 10 MG tablet Take 1 tablet by mouth daily 30 tablet 0    fluticasone (FLONASE) 50 MCG/ACT nasal spray 1 spray by Nasal route daily 1 Bottle 0    fexofenadine (ALLEGRA) 180 MG tablet Take 1 tablet by mouth daily 30 tablet 0    guaiFENesin (MUCINEX) 600 MG extended release tablet Take 1 tablet by mouth 2 times daily for 15 days 30 tablet 0    amLODIPine (NORVASC) 2.5 MG tablet Take 1 tablet by mouth daily 90 tablet 3    desvenlafaxine succinate (PRISTIQ) 25 MG TB24 extended release tablet Take 1 tablet by mouth daily 30 tablet 0    acetaminophen (TYLENOL) 500 MG tablet Take 1 tablet by mouth every 6 hours as needed for Pain 120 tablet 5     No current facility-administered medications on file prior to visit. I have personally reviewed the ROS, PMH, PFH, and social history     Review of Systems   Constitutional: Negative for chills. HENT: Negative for congestion. Eyes: Negative for pain.    Respiratory: Negative for shortness of

## 2020-05-04 ENCOUNTER — VIRTUAL VISIT (OUTPATIENT)
Dept: FAMILY MEDICINE CLINIC | Age: 30
End: 2020-05-04
Payer: COMMERCIAL

## 2020-05-04 PROCEDURE — G8428 CUR MEDS NOT DOCUMENT: HCPCS | Performed by: FAMILY MEDICINE

## 2020-05-04 PROCEDURE — 99213 OFFICE O/P EST LOW 20 MIN: CPT | Performed by: FAMILY MEDICINE

## 2020-05-04 RX ORDER — CITALOPRAM 10 MG/1
10 TABLET ORAL DAILY
Qty: 90 TABLET | Refills: 3 | Status: SHIPPED | OUTPATIENT
Start: 2020-05-04 | End: 2021-05-10

## 2020-05-04 ASSESSMENT — ENCOUNTER SYMPTOMS
EYES NEGATIVE: 1
ALLERGIC/IMMUNOLOGIC NEGATIVE: 1
SHORTNESS OF BREATH: 0
RESPIRATORY NEGATIVE: 1
GASTROINTESTINAL NEGATIVE: 1

## 2020-05-04 ASSESSMENT — PATIENT HEALTH QUESTIONNAIRE - PHQ9
SUM OF ALL RESPONSES TO PHQ QUESTIONS 1-9: 0
SUM OF ALL RESPONSES TO PHQ9 QUESTIONS 1 & 2: 0
1. LITTLE INTEREST OR PLEASURE IN DOING THINGS: 0
2. FEELING DOWN, DEPRESSED OR HOPELESS: 0
SUM OF ALL RESPONSES TO PHQ QUESTIONS 1-9: 0

## 2020-05-04 NOTE — PROGRESS NOTES
Patient is seen in follow up for   Chief Complaint   Patient presents with    Depression     HPIFollow up on celexa doing well. Past Medical History:   Diagnosis Date    Anxiety     Depression     Essential hypertension 10/10/2017    Jejunal intussusception ([address]     Nonintractable migraine 10/10/2017     Patient Active Problem List    Diagnosis Date Noted    Depression 2018    Subareolar mass of left breast 2018    Essential hypertension 10/10/2017    Nonintractable migraine 10/10/2017    Anxiety 2017     No past surgical history on file.   Family History   Problem Relation Age of Onset    High Blood Pressure Father     Mental Illness Father     Other Father         gout     Social History     Socioeconomic History    Marital status:      Spouse name: Not on file    Number of children: Not on file    Years of education: Not on file    Highest education level: Not on file   Occupational History    Not on file   Social Needs    Financial resource strain: Not on file    Food insecurity     Worry: Not on file     Inability: Not on file    Transportation needs     Medical: Not on file     Non-medical: Not on file   Tobacco Use    Smoking status: Current Every Day Smoker     Packs/day: 0.50     Years: 5.00     Pack years: 2.50     Last attempt to quit: 2017     Years since quittin.3    Smokeless tobacco: Never Used   Substance and Sexual Activity    Alcohol use: No    Drug use: No    Sexual activity: Not on file   Lifestyle    Physical activity     Days per week: Not on file     Minutes per session: Not on file    Stress: Not on file   Relationships    Social connections     Talks on phone: Not on file     Gets together: Not on file     Attends Christianity service: Not on file     Active member of club or organization: Not on file     Attends meetings of clubs or organizations: Not on file     Relationship status: Not on file    Intimate partner violence tablet by mouth daily 90 tablet 3    acetaminophen (TYLENOL) 500 MG tablet Take 1 tablet by mouth every 6 hours as needed for Pain 120 tablet 5     No current facility-administered medications on file prior to visit. Allergies   Allergen Reactions    Amoxil [Amoxicillin]      unknow to pt happens happens when he takes it    Buspar [Buspirone]      Groggy     Lexapro [Escitalopram Oxalate]      groggy    Zoloft [Sertraline Hcl]      Lost effectiveness      Health Maintenance   Topic Date Due    Varicella vaccine (1 of 2 - 2-dose childhood series) 06/15/1991    Pneumococcal 0-64 years Vaccine (1 of 1 - PPSV23) 06/15/1996    HIV screen  06/15/2005    A1C test (Diabetic or Prediabetic)  10/23/2020    Potassium monitoring  10/23/2020    Creatinine monitoring  10/23/2020    DTaP/Tdap/Td vaccine (2 - Td) 01/01/2022    Flu vaccine  Completed    Hepatitis A vaccine  Aged Out    Hepatitis B vaccine  Aged Out    Hib vaccine  Aged Out    Meningococcal (ACWY) vaccine  Aged Out       Review of Systems     Review of Systems   Constitutional: Negative for activity change, appetite change, chills, fever and unexpected weight change. HENT: Negative. Eyes: Negative. Respiratory: Negative. Negative for shortness of breath. Cardiovascular: Negative. Negative for chest pain and palpitations. Gastrointestinal: Negative. Endocrine: Negative. Genitourinary: Negative. Musculoskeletal: Negative. Skin: Negative. Allergic/Immunologic: Negative. Neurological: Negative. Hematological: Negative. Psychiatric/Behavioral: Negative. Physical Exam  There were no vitals filed for this visit. Physical Exam    Assessment   Diagnosis Orders   1. Depression, unspecified depression type     2.  Anxiety       Problem List     Anxiety    Relevant Medications    desvenlafaxine succinate (PRISTIQ) 25 MG TB24 extended release tablet    ALPRAZolam (XANAX) 0.25 MG tablet    citalopram (CELEXA) 10

## 2020-05-07 ENCOUNTER — TELEPHONE (OUTPATIENT)
Dept: FAMILY MEDICINE CLINIC | Age: 30
End: 2020-05-07

## 2020-05-29 ENCOUNTER — HOSPITAL ENCOUNTER (OUTPATIENT)
Dept: NON INVASIVE DIAGNOSTICS | Age: 30
Discharge: HOME | End: 2020-05-29
Payer: COMMERCIAL

## 2020-05-29 PROCEDURE — 93017 CV STRESS TEST TRACING ONLY: CPT

## 2020-05-29 PROCEDURE — 93226 XTRNL ECG REC<48 HR SCAN A/R: CPT

## 2020-05-29 PROCEDURE — 93225 XTRNL ECG REC<48 HRS REC: CPT

## 2020-05-29 NOTE — PROGRESS NOTES
11:04 Hx,allergies and medications reviewed. Procedure explained and informed consent obtained. Tolerated treadmill well for 9:30 minutes. Reached 95 % target HR. SOB noted. Returned to baseline in recovery. Denies chest pain or pressure. EKG shows No ectopy.

## 2020-06-01 NOTE — PROCEDURES
[address]                      [facility], [address] [facility]                              CARDIAC STRESS TEST    PATIENT NAME: Jillian Gupta                    :        1990  MED REC NO:   [id number]                            ROOM:  ACCOUNT NO:   [de-identified]                           ADMIT DATE: 2020  PROVIDER:     Arlin Ace DO    CARDIOVASCULAR DIAGNOSTIC DEPARTMENT    DATE OF STUDY:  2020    TREADMILL STRESS EKG    ORDERING PROVIDER:  Lilly Powell MD    PRIMARY CARE PROVIDER:  Ryan Horne MD    REASON FOR EXAM:  Chest pain. DESCRIPTION OF PROCEDURE:  The patient was exercised on Vitor protocol  for 9 minutes and 30 seconds achieving 10.5 METs of activity. Resting  heart rate of 83 and maximum heart rate of 182 beats per minute, which  represents 95% of maximum age-predicted heart rate. Resting blood  pressure was 127/90, maximum blood pressure of 150/75. Exercise stress  test was stopped secondary to fatigue and end of protocol. There was no  chest pain, chest pressure, or syncope reported. Baseline EKG showed  normal sinus rhythm, heart rate of 86 beats per minute with nonspecific  ST changes. EKG at peak exercise in recovery did not show any  significant changes from baseline. There was no evidence of any ST  elevation, ST depression, or T-wave inversion. There was evidence of  any malignant tachy or bradyarrhythmias. IMPRESSION:  1. Negative maximal treadmill stress EKG. 2.  Good functional capacity for age. 3.  Normal hemodynamic response to exercise. 4.  No evidence of any malignant tachy or bradyarrhythmias. 5.  No reported chest pain, chest pressure, or syncope.         Leslie Barnhart DO    D: 2020 #8:17:29       T: 2020 9:21:51     [email or web address]  Job#: [id number]     Doc#: [id number]    CC:

## 2020-06-23 ENCOUNTER — VIRTUAL VISIT (OUTPATIENT)
Dept: FAMILY MEDICINE CLINIC | Age: 30
End: 2020-06-23
Payer: COMMERCIAL

## 2020-06-23 PROCEDURE — 99213 OFFICE O/P EST LOW 20 MIN: CPT | Performed by: FAMILY MEDICINE

## 2020-06-23 PROCEDURE — G8427 DOCREV CUR MEDS BY ELIG CLIN: HCPCS | Performed by: FAMILY MEDICINE

## 2020-06-23 ASSESSMENT — ENCOUNTER SYMPTOMS
RESPIRATORY NEGATIVE: 1
EYES NEGATIVE: 1
GASTROINTESTINAL NEGATIVE: 1
SHORTNESS OF BREATH: 0
ALLERGIC/IMMUNOLOGIC NEGATIVE: 1

## 2020-06-23 ASSESSMENT — PATIENT HEALTH QUESTIONNAIRE - PHQ9
SUM OF ALL RESPONSES TO PHQ QUESTIONS 1-9: 0
SUM OF ALL RESPONSES TO PHQ QUESTIONS 1-9: 0
SUM OF ALL RESPONSES TO PHQ9 QUESTIONS 1 & 2: 0
2. FEELING DOWN, DEPRESSED OR HOPELESS: 0
1. LITTLE INTEREST OR PLEASURE IN DOING THINGS: 0

## 2020-06-23 NOTE — PROGRESS NOTES
file   Occupational History    Not on file   Social Needs    Financial resource strain: Not on file    Food insecurity     Worry: Not on file     Inability: Not on file    Transportation needs     Medical: Not on file     Non-medical: Not on file   Tobacco Use    Smoking status: Former Smoker     Packs/day: 0.50     Years: 5.00     Pack years: 2.50     Last attempt to quit: 2019     Years since quittin.5    Smokeless tobacco: Never Used   Substance and Sexual Activity    Alcohol use: No    Drug use: No    Sexual activity: Not on file   Lifestyle    Physical activity     Days per week: Not on file     Minutes per session: Not on file    Stress: Not on file   Relationships    Social connections     Talks on phone: Not on file     Gets together: Not on file     Attends Anglican service: Not on file     Active member of club or organization: Not on file     Attends meetings of clubs or organizations: Not on file     Relationship status: Not on file    Intimate partner violence     Fear of current or ex partner: Not on file     Emotionally abused: Not on file     Physically abused: Not on file     Forced sexual activity: Not on file   Other Topics Concern    Not on file   Social History Narrative    Not on file     Current Outpatient Medications   Medication Sig Dispense Refill    citalopram (CELEXA) 10 MG tablet Take 1 tablet by mouth daily 90 tablet 3    famotidine (PEPCID) 40 MG tablet Take 1/2 tab every 12 hours as needed for acid reflux 90 tablet 0    fluticasone (FLONASE) 50 MCG/ACT nasal spray 1 spray by Nasal route daily 1 Bottle 0    amLODIPine (NORVASC) 2.5 MG tablet Take 1 tablet by mouth daily 90 tablet 3    acetaminophen (TYLENOL) 500 MG tablet Take 1 tablet by mouth every 6 hours as needed for Pain 120 tablet 5     No current facility-administered medications for this visit.       Current Outpatient Medications on File Prior to Visit   Medication Sig Dispense Refill    citalopram (CELEXA) 10 MG tablet Take 1 tablet by mouth daily 90 tablet 3    famotidine (PEPCID) 40 MG tablet Take 1/2 tab every 12 hours as needed for acid reflux 90 tablet 0    fluticasone (FLONASE) 50 MCG/ACT nasal spray 1 spray by Nasal route daily 1 Bottle 0    amLODIPine (NORVASC) 2.5 MG tablet Take 1 tablet by mouth daily 90 tablet 3    acetaminophen (TYLENOL) 500 MG tablet Take 1 tablet by mouth every 6 hours as needed for Pain 120 tablet 5     No current facility-administered medications on file prior to visit. Allergies   Allergen Reactions    Amoxil [Amoxicillin]      unknow to pt happens happens when he takes it    Buspar [Buspirone]      Groggy     Lexapro [Escitalopram Oxalate]      groggy    Zoloft [Sertraline Hcl]      Lost effectiveness      Health Maintenance   Topic Date Due    Varicella vaccine (1 of 2 - 2-dose childhood series) 06/15/1991    HIV screen  06/15/2005    A1C test (Diabetic or Prediabetic)  10/23/2020    Potassium monitoring  10/23/2020    Creatinine monitoring  10/23/2020    DTaP/Tdap/Td vaccine (2 - Td) 01/01/2022    Flu vaccine  Completed    Hepatitis A vaccine  Aged Out    Hepatitis B vaccine  Aged Out    Hib vaccine  Aged Out    Meningococcal (ACWY) vaccine  Aged Out    Pneumococcal 0-64 years Vaccine  Aged Out       Review of Systems     Review of Systems   Constitutional: Positive for fatigue. Negative for activity change, appetite change, chills, fever and unexpected weight change. HENT: Negative. Eyes: Negative. Respiratory: Negative. Negative for shortness of breath. Cardiovascular: Positive for palpitations. Negative for chest pain. Gastrointestinal: Negative. Endocrine: Negative. Genitourinary: Negative. Musculoskeletal: Negative. Skin: Negative. Allergic/Immunologic: Negative. Neurological: Negative. Hematological: Negative. Psychiatric/Behavioral: Negative.         Physical Exam  There were no vitals filed for this visit. Physical Exam    Assessment   Diagnosis Orders   1. Fatigue, unspecified type  Sleep Study with PAP Titration   2. Tachy-jae syndrome ([facility] [address]       Problem List     None          Plan  Orders Placed This Encounter   Procedures    Sleep Study with PAP Titration     Patients with abnormal results will be assessed and referred to the sleep  as needed. Standing Status:   Future     Standing Expiration Date:   8/23/2020     Scheduling Instructions:      Scheduling and Pre-Certification: [phone]      The following must be completed and FAXED to [phone]      1) Sleep Evaluation Orders Form      2) Office note justifying study      3) Demographic info / insurance card     Order Specific Question:   Sleep Study Titration Type     Answer:   CPAP     Order Specific Question:   Location For Sleep Study     Answer:   [place]     Order Specific Question:   Select Sleep Lab Location     Answer:   [facility]     Comments:   [place]     No orders of the defined types were placed in this encounter. Return in about 3 months (around 9/23/2020), or if symptoms worsen or fail to improve.   Vishnu Raymond MD

## 2020-06-24 ENCOUNTER — TELEPHONE (OUTPATIENT)
Dept: FAMILY MEDICINE CLINIC | Age: 30
End: 2020-06-24

## 2020-07-08 ENCOUNTER — OFFICE VISIT (OUTPATIENT)
Dept: FAMILY MEDICINE CLINIC | Age: 30
End: 2020-07-08
Payer: COMMERCIAL

## 2020-07-08 VITALS
BODY MASS INDEX: 35.36 KG/M2 | DIASTOLIC BLOOD PRESSURE: 78 MMHG | HEART RATE: 84 BPM | SYSTOLIC BLOOD PRESSURE: 118 MMHG | OXYGEN SATURATION: 98 % | TEMPERATURE: 97.4 F | HEIGHT: 66 IN | WEIGHT: 220 LBS

## 2020-07-08 PROCEDURE — 1036F TOBACCO NON-USER: CPT | Performed by: NURSE PRACTITIONER

## 2020-07-08 PROCEDURE — G8417 CALC BMI ABV UP PARAM F/U: HCPCS | Performed by: NURSE PRACTITIONER

## 2020-07-08 PROCEDURE — 99213 OFFICE O/P EST LOW 20 MIN: CPT | Performed by: NURSE PRACTITIONER

## 2020-07-08 PROCEDURE — G8427 DOCREV CUR MEDS BY ELIG CLIN: HCPCS | Performed by: NURSE PRACTITIONER

## 2020-07-08 RX ORDER — METHYLPREDNISOLONE 4 MG/1
TABLET ORAL
Qty: 1 KIT | Refills: 0 | Status: SHIPPED | OUTPATIENT
Start: 2020-07-08 | End: 2021-07-20 | Stop reason: CLARIF

## 2020-07-08 SDOH — ECONOMIC STABILITY: FOOD INSECURITY: WITHIN THE PAST 12 MONTHS, YOU WORRIED THAT YOUR FOOD WOULD RUN OUT BEFORE YOU GOT MONEY TO BUY MORE.: NEVER TRUE

## 2020-07-08 SDOH — ECONOMIC STABILITY: FOOD INSECURITY: WITHIN THE PAST 12 MONTHS, THE FOOD YOU BOUGHT JUST DIDN'T LAST AND YOU DIDN'T HAVE MONEY TO GET MORE.: NEVER TRUE

## 2020-07-08 NOTE — PROGRESS NOTES
Subjective  [place], 27 y.o. male presents today with:  Chief Complaint   Patient presents with    Leg Pain     patient here today with left leg pain woke up with it doesn't have full motion, mainly around knee        HPI   Presents today for left knee pain   Pain to left knee when he woke up this morning  He is having difficulty walking   No injury occurred   Afebrile  Denies other joint pain at this time   Stated in the past he woke up with low back pain. No injury at that time. This was responsive to steroid IM injection & NSAID           Past Medical History:   Diagnosis Date    Anxiety     Depression     Essential hypertension 10/10/2017    Jejunal intussusception ([address]     Nonintractable migraine 10/10/2017      No past surgical history on file. Family History   Problem Relation Age of Onset    High Blood Pressure Father     Mental Illness Father     Other Father         gout       Review of Systems   Constitutional: Positive for activity change. Negative for chills, diaphoresis, fatigue and fever. Musculoskeletal: Positive for arthralgias. Skin: Negative for pallor. Neurological: Negative for dizziness, light-headedness and headaches. PMH, Surgical Hx, Family Hx, and Social Hx reviewed and updated. Objective  Vitals:    07/08/20 1132   BP: 118/78   Site: Left Upper Arm   Position: Sitting   Cuff Size: Large Adult   Pulse: 84   Temp: 97.4 °F (36.3 °C)   TempSrc: Temporal   SpO2: 98%   Weight: 220 lb (99.8 kg)   Height: 5' 6\" (1.676 m)     BP Readings from Last 3 Encounters:   07/08/20 118/78   04/10/20 124/88   01/15/20 122/88     Wt Readings from Last 3 Encounters:   07/08/20 220 lb (99.8 kg)   04/10/20 221 lb (100.2 kg)   01/15/20 229 lb (103.9 kg)       Physical Exam  Vitals signs reviewed. Constitutional:       Appearance: Normal appearance. He is well-developed. He is not diaphoretic. HENT:      Head: Normocephalic.    Eyes:      General: Lids are normal. Conjunctiva/sclera: Conjunctivae normal.   Neck:      Musculoskeletal: Normal range of motion. No neck rigidity or pain with movement. Cardiovascular:      Rate and Rhythm: Normal rate. Pulses:           Dorsalis pedis pulses are 2+ on the left side. Posterior tibial pulses are 2+ on the left side. Pulmonary:      Effort: Pulmonary effort is normal.   Musculoskeletal:      Left knee: He exhibits decreased range of motion. He exhibits no swelling, no ecchymosis, no deformity, no laceration, no erythema, normal alignment, no LCL laxity, no bony tenderness and no MCL laxity. Legs:    Skin:     General: Skin is warm and dry. Capillary Refill: Capillary refill takes less than 2 seconds. Coloration: Skin is not pale. Neurological:      General: No focal deficit present. Mental Status: He is alert and oriented to person, place, and time. Psychiatric:         Mood and Affect: Mood normal.         Behavior: Behavior normal.         Assessment & Plan    Diagnosis Orders   1. Acute pain of left knee  XR KNEE LEFT (3 VIEWS)    methylPREDNISolone (MEDROL, MARY,) 4 MG tablet   2. Impaired mobility       Orders Placed This Encounter   Procedures    XR KNEE LEFT (3 VIEWS)     Standing Status:   Future     Number of Occurrences:   1     Standing Expiration Date:   7/8/2021     Orders Placed This Encounter   Medications    methylPREDNISolone (MEDROL, MARY,) 4 MG tablet     Sig: Take by mouth. Dispense:  1 kit     Refill:  0     Return if symptoms worsen or fail to improve, for follow up with PCP. Reviewed with the patient: current clinical status & medications. Side effects, adverse effects of the medication prescribed today, as well as treatment plan/rationale and result expectations have been discussed with the patient who expresses understanding. Pt aware of xray result. Oral Steroid Instructions:  · Take each dose with a small snack or meal to lessen potential GI upset. · Follow dosing instructions provided with prescription. · Common side effects include difficulty sleeping and irritability. · Take full course as ordered. Close follow up to evaluate treatment results and for coordination of care. I have reviewed the patient's medical history in detail and updated the computerized patient record.     APARNA Yo NP

## 2020-07-14 RX ORDER — FAMOTIDINE 40 MG/1
TABLET, FILM COATED ORAL
Qty: 90 TABLET | Refills: 0 | Status: SHIPPED | OUTPATIENT
Start: 2020-07-14 | End: 2021-07-20 | Stop reason: CLARIF

## 2020-09-14 RX ORDER — ALPRAZOLAM 0.25 MG/1
TABLET ORAL
Qty: 30 TABLET | Refills: 5 | Status: SHIPPED | OUTPATIENT
Start: 2020-09-14 | End: 2020-09-14

## 2020-11-17 ENCOUNTER — VIRTUAL VISIT (OUTPATIENT)
Dept: FAMILY MEDICINE CLINIC | Age: 30
End: 2020-11-17
Payer: COMMERCIAL

## 2020-11-17 DIAGNOSIS — J06.9 VIRAL URI: ICD-10-CM

## 2020-11-17 PROCEDURE — 99212 OFFICE O/P EST SF 10 MIN: CPT | Performed by: NURSE PRACTITIONER

## 2020-11-17 PROCEDURE — G8427 DOCREV CUR MEDS BY ELIG CLIN: HCPCS | Performed by: NURSE PRACTITIONER

## 2020-11-17 RX ORDER — ACETAMINOPHEN 500 MG
TABLET ORAL
COMMUNITY
Start: 2020-01-15 | End: 2021-07-20 | Stop reason: CLARIF

## 2020-11-17 RX ORDER — ALPRAZOLAM 0.25 MG/1
TABLET ORAL
COMMUNITY
Start: 2020-10-29 | End: 2021-07-20 | Stop reason: CLARIF

## 2020-11-17 ASSESSMENT — ENCOUNTER SYMPTOMS
SHORTNESS OF BREATH: 1
ABDOMINAL PAIN: 0
SINUS PRESSURE: 1
COUGH: 1
NAUSEA: 0
COLOR CHANGE: 0
VOMITING: 0
WHEEZING: 0
DIARRHEA: 0

## 2020-11-17 NOTE — LETTER
NOTIFICATION RETURN TO WORK / SCHOOL    11/17/2020    Mr. [facility] [id number]      To Whom It May Concern:    Cierra Morocho was tested for COVID-19 on 11/17, and the results can take 3-10 days to come back. He may return to work if his test is negative and he remains without symptoms. .  I recommend:return without restrictions if his testing is negative. If there are questions or concerns, please have the patient contact our office.         Sincerely,      Dyana Woodall, APRN - CNP

## 2020-11-17 NOTE — PROGRESS NOTES
2020    TELEHEALTH EVALUATION -- Audio/Visual (During [id number] public health emergency)    Due to COVID 19 outbreak, patient's office visit was converted to a virtual visit. Patient was contacted and agreed to proceed with a virtual visit via Telephone Visit approx 10-15 mins  The risks and benefits of converting to a virtual visit were discussed in light of the current infectious disease epidemic. Patient also understood that insurance coverage and co-pays are up to their individual insurance plans. 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 Negative for activity change, chills, fatigue (reduced ) and fever. HENT: Positive for congestion, postnasal drip (has not been using flonase or allergy medication) and sinus pressure. Has bad allergies, about the same as its been for the last 2 months    Respiratory: Positive for cough (sometimes ) and shortness of breath (from his anxiety ). Negative for wheezing. Gastrointestinal: Negative for abdominal pain, diarrhea, nausea and vomiting. Musculoskeletal: Positive for myalgias (chronic). Skin: Negative for color change and rash. Allergic/Immunologic: Positive for environmental allergies. Neurological: Positive for headaches (due to not taking his medication on time ). Negative for dizziness and syncope. Prior to Visit Medications    Medication Sig Taking?  Authorizing Provider   acetaminophen (TYLENOL) 500 MG tablet Take by mouth Yes Historical Provider, MD   ALPRAZolam Lili Nones) 0.25 MG tablet Historical Provider, MD   famotidine (PEPCID) 40 MG tablet TAKE 1/2 TAB EVERY 12 HOURS AS NEEDED FOR ACID REFLUX  Quoc Maloney MD   methylPREDNISolone (MEDROL, MARY,) 4 MG tablet Take by mouth. APARNA Navarro NP   citalopram (CELEXA) 10 MG tablet Take 1 tablet by mouth daily  Gus Linder MD   fluticasone (FLONASE) 50 MCG/ACT nasal spray 1 spray by Nasal route daily  Quoc Maloney MD   amLODIPine (NORVASC) 2.5 MG tablet Take 1 tablet by mouth daily  Quoc Maloney MD   acetaminophen (TYLENOL) 500 MG tablet Take 1 tablet by mouth every 6 hours as needed for Pain  Quoc Maloney MD       Social History     Tobacco Use    Smoking status: Former Smoker     Packs/day: 0.50     Years: 5.00     Pack years: 2.50     Last attempt to quit: 2019     Years since quittin.9    Smokeless tobacco: Never Used   Substance Use Topics    Alcohol use: No    Drug use: No        Allergies   Allergen Reactions    Amoxil [Amoxicillin]      unknow to pt happens happens when he takes it   [facility] [Buspirone]      Groggy     Lexapro [Escitalopram Oxalate]      groggy    Zoloft [Sertraline Hcl]      Lost effectiveness    ,   Past Medical History:   Diagnosis Date    Anxiety     Depression     Essential hypertension 10/10/2017    Jejunal intussusception ([facility] [address]     Nonintractable migraine 10/10/2017   , History reviewed. No pertinent surgical history. ,   Social History     Tobacco Use    Smoking status: Former Smoker     Packs/day: 0.50     Years: 5.00     Pack years: 2.50     Last attempt to quit: 2019     Years since quittin.9    Smokeless tobacco: Never Used   Substance Use Topics    Alcohol use: No    Drug use: No   ,   Family History   Problem Relation Age of Onset    High Blood Pressure Father     Mental Illness Father     Other Father         gout   ,   Immunization History   Administered Date(s) Administered    Influenza Virus Vaccine 10/21/2014    Influenza, Lima Jacks, 11/17/2020 at 2:06 PM        Pursuant to the emergency declaration under the [address] [place] [address] and the [employer] Act, this Virtual  Visit was conducted, with patient's consent, to reduce the patient's risk of exposure to COVID-19 and provide continuity of care for an established patient. Services were provided through a video synchronous discussion virtually to substitute for in-person clinic visit.

## 2020-11-18 ENCOUNTER — TELEPHONE (OUTPATIENT)
Dept: FAMILY MEDICINE CLINIC | Age: 30
End: 2020-11-18

## 2020-11-20 LAB
SARS-COV-2: NOT DETECTED
SOURCE: NORMAL

## 2020-12-24 RX ORDER — AMLODIPINE BESYLATE 2.5 MG/1
2.5 TABLET ORAL DAILY
Qty: 90 TABLET | Refills: 3 | Status: SHIPPED | OUTPATIENT
Start: 2020-12-24 | End: 2021-03-30 | Stop reason: SDUPTHER

## 2021-03-30 ENCOUNTER — OFFICE VISIT (OUTPATIENT)
Dept: FAMILY MEDICINE CLINIC | Age: 31
End: 2021-03-30
Payer: COMMERCIAL

## 2021-03-30 VITALS
DIASTOLIC BLOOD PRESSURE: 78 MMHG | TEMPERATURE: 97.2 F | HEART RATE: 90 BPM | WEIGHT: 247.8 LBS | HEIGHT: 66 IN | OXYGEN SATURATION: 97 % | SYSTOLIC BLOOD PRESSURE: 126 MMHG | BODY MASS INDEX: 39.82 KG/M2

## 2021-03-30 DIAGNOSIS — M54.6 ACUTE LEFT-SIDED THORACIC BACK PAIN: Primary | ICD-10-CM

## 2021-03-30 PROCEDURE — G8484 FLU IMMUNIZE NO ADMIN: HCPCS | Performed by: NURSE PRACTITIONER

## 2021-03-30 PROCEDURE — 1036F TOBACCO NON-USER: CPT | Performed by: NURSE PRACTITIONER

## 2021-03-30 PROCEDURE — G8427 DOCREV CUR MEDS BY ELIG CLIN: HCPCS | Performed by: NURSE PRACTITIONER

## 2021-03-30 PROCEDURE — G8417 CALC BMI ABV UP PARAM F/U: HCPCS | Performed by: NURSE PRACTITIONER

## 2021-03-30 PROCEDURE — 99213 OFFICE O/P EST LOW 20 MIN: CPT | Performed by: NURSE PRACTITIONER

## 2021-03-30 PROCEDURE — 96372 THER/PROPH/DIAG INJ SC/IM: CPT | Performed by: NURSE PRACTITIONER

## 2021-03-30 RX ORDER — KETOROLAC TROMETHAMINE 30 MG/ML
60 INJECTION, SOLUTION INTRAMUSCULAR; INTRAVENOUS ONCE
Status: COMPLETED | OUTPATIENT
Start: 2021-03-30 | End: 2021-03-30

## 2021-03-30 RX ORDER — METHYLPREDNISOLONE ACETATE 80 MG/ML
80 INJECTION, SUSPENSION INTRA-ARTICULAR; INTRALESIONAL; INTRAMUSCULAR; SOFT TISSUE ONCE
Status: COMPLETED | OUTPATIENT
Start: 2021-03-30 | End: 2021-03-30

## 2021-03-30 RX ORDER — AMLODIPINE BESYLATE 2.5 MG/1
2.5 TABLET ORAL DAILY
Qty: 30 TABLET | Refills: 1 | Status: SHIPPED | OUTPATIENT
Start: 2021-03-30 | End: 2021-06-03

## 2021-03-30 RX ORDER — KETOROLAC TROMETHAMINE 10 MG/1
10 TABLET, FILM COATED ORAL EVERY 6 HOURS PRN
Qty: 20 TABLET | Refills: 0 | Status: SHIPPED | OUTPATIENT
Start: 2021-03-30 | End: 2021-07-20 | Stop reason: CLARIF

## 2021-03-30 RX ADMIN — KETOROLAC TROMETHAMINE 60 MG: 30 INJECTION, SOLUTION INTRAMUSCULAR; INTRAVENOUS at 11:42

## 2021-03-30 RX ADMIN — METHYLPREDNISOLONE ACETATE 80 MG: 80 INJECTION, SUSPENSION INTRA-ARTICULAR; INTRALESIONAL; INTRAMUSCULAR; SOFT TISSUE at 11:45

## 2021-03-30 ASSESSMENT — ENCOUNTER SYMPTOMS
ABDOMINAL PAIN: 0
RHINORRHEA: 0
TROUBLE SWALLOWING: 0
COUGH: 0
SHORTNESS OF BREATH: 0
SORE THROAT: 0
CHEST TIGHTNESS: 0
NAUSEA: 0
BACK PAIN: 1
DIARRHEA: 0
VOMITING: 0
WHEEZING: 0

## 2021-03-30 NOTE — PROGRESS NOTES
Subjective:      Patient ID: Jun Garcia is a 27 y.o. male who presents today for:  Chief Complaint   Patient presents with    Back Pain     patient presents today c/o Lt side back pain x3 days. HPI    hes been having back pain- Left side  Cant turn, move or bend  Pain is an 8/10  No numbness or tingling in the legs  No involuntary stool  No burning with urination or difficulty urinating   Had this same pain almost exactly a year ago   He had gotten a shot in the but and within 15 minutes the pain was better  He woke up like this, no injury that he is aware of  About 2 days ago was a very small sensation of something   Yesterday got worse  This morning unbearable         Past Medical History:   Diagnosis Date    Anxiety     Depression     Essential hypertension 10/10/2017    [facility]uss[facility]ception ([facility] [address]     Nonintractable migraine 10/10/2017     History reviewed. No pertinent surgical history.   Social History     Socioeconomic History    Marital status:      Spouse name: Not on file    Number of children: Not on file    Years of education: Not on file    Highest education level: Not on file   Occupational History    Not on file   Social Needs    Financial resource strain: Not on file    Food insecurity     Worry: Never true     Inability: Never true   Access Information Management needs     Medical: Not on file     Non-medical: Not on file   Tobacco Use    Smoking status: Former Smoker     Packs/day: 0.50     Years: 5.00     Pack years: 2.50     Quit date: 2019     Years since quittin.2    Smokeless tobacco: Never Used   Substance and Sexual Activity    Alcohol use: No    Drug use: Yes     Types: Marijuana    Sexual activity: Not on file   Lifestyle    Physical activity     Days per week: Not on file     Minutes per session: Not on file    Stress: Not on file   Relationships    Social connections     Talks on phone: Not on file     Gets together: Not on file     Attends Alevism service: Not on file     Active member of club or organization: Not on file     Attends meetings of clubs or organizations: Not on file     Relationship status: Not on file    Intimate partner violence     Fear of current or ex partner: Not on file     Emotionally abused: Not on file     Physically abused: Not on file     Forced sexual activity: Not on file   Other Topics Concern    Not on file   Social History Narrative    Not on file     Family History   Problem Relation Age of Onset    High Blood Pressure Father     Mental Illness Father     Other Father         gout     Allergies   Allergen Reactions    Amoxil [Amoxicillin]      unknow to pt happens happens when he takes it    Buspar [Buspirone]      Groggy     Lexapro [Escitalopram Oxalate]      groggy    Zoloft [Sertraline Hcl]      Lost effectiveness      Current Outpatient Medications   Medication Sig Dispense Refill    ketorolac (TORADOL) 10 MG tablet Take 1 tablet by mouth every 6 hours as needed for Pain 20 tablet 0    amLODIPine (NORVASC) 2.5 MG tablet Take 1 tablet by mouth daily 90 tablet 3    citalopram (CELEXA) 10 MG tablet Take 1 tablet by mouth daily 90 tablet 3    fluticasone (FLONASE) 50 MCG/ACT nasal spray 1 spray by Nasal route daily 1 Bottle 0    acetaminophen (TYLENOL) 500 MG tablet Take 1 tablet by mouth every 6 hours as needed for Pain 120 tablet 5    ALPRAZolam (XANAX) 0.25 MG tablet       acetaminophen (TYLENOL) 500 MG tablet Take by mouth      famotidine (PEPCID) 40 MG tablet TAKE 1/2 TAB EVERY 12 HOURS AS NEEDED FOR ACID REFLUX (Patient not taking: Reported on 3/30/2021) 90 tablet 0    methylPREDNISolone (MEDROL, MARY,) 4 MG tablet Take by mouth. (Patient not taking: Reported on 3/30/2021) 1 kit 0     No current facility-administered medications for this visit. Review of Systems   Constitutional: Negative for chills, fatigue and fever.    HENT: Negative for congestion, rhinorrhea, sore throat and trouble swallowing. Respiratory: Negative for cough, chest tightness, shortness of breath and wheezing. Gastrointestinal: Negative for abdominal pain, diarrhea, nausea and vomiting. Genitourinary: Negative for difficulty urinating, dysuria and frequency. Musculoskeletal: Positive for back pain. Negative for myalgias. Skin: Negative for rash. Allergic/Immunologic: Positive for environmental allergies. Neurological: Negative for dizziness, light-headedness and headaches. Psychiatric/Behavioral: Negative for agitation, confusion and hallucinations. Objective:   /78 (Site: Left Upper Arm, Position: Sitting, Cuff Size: Large Adult)   Pulse 90   Temp 97.2 °F (36.2 °C) (Temporal)   Ht 5' 6\" (1.676 m)   Wt 247 lb 12.8 oz (112.4 kg)   SpO2 97%   BMI 40.00 kg/m²     Physical Exam  Vitals signs reviewed. Constitutional:       Appearance: Normal appearance. HENT:      Head: Normocephalic and atraumatic. Nose: Nose normal.      Mouth/Throat:      Lips: Pink. Mouth: Mucous membranes are moist.   Eyes:      General: Lids are normal.      Conjunctiva/sclera: Conjunctivae normal.   Neck:      Musculoskeletal: Normal range of motion. Cardiovascular:      Rate and Rhythm: Normal rate and regular rhythm. Heart sounds: Normal heart sounds and S2 normal.   Pulmonary:      Effort: Pulmonary effort is normal. No accessory muscle usage or respiratory distress. Breath sounds: Decreased breath sounds present. No wheezing or rhonchi. Abdominal:      Tenderness: There is no guarding. Musculoskeletal:      Thoracic back: He exhibits decreased range of motion and pain. He exhibits no edema. Back:       Comments: There is more pain with movement and with palpation   Skin:     General: Skin is warm and dry. Neurological:      General: No focal deficit present. Mental Status: He is alert and oriented to person, place, and time.    Psychiatric:         Mood and Affect: Mood normal.         Behavior: Behavior normal. Behavior is cooperative. Assessment:       Diagnosis Orders   1. Acute left-sided thoracic back pain  methylPREDNISolone acetate (DEPO-MEDROL) injection 80 mg    ketorolac (TORADOL) 10 MG tablet    ketorolac (TORADOL) injection 60 mg     No results found for this visit on 03/30/21. Plan:   Likely this is a thoracic back strain. Hes on Pepcid and will take it if he takes the Toradol at home. Gave exercises to do once feeling a little better. He is afraid to take pills, has a fear of them therefore will give IM Toradol as well. Assessment & Plan   Alisha Jeffries was seen today for back pain. Diagnoses and all orders for this visit:    Acute left-sided thoracic back pain  -     methylPREDNISolone acetate (DEPO-MEDROL) injection 80 mg  -     ketorolac (TORADOL) 10 MG tablet; Take 1 tablet by mouth every 6 hours as needed for Pain  -     ketorolac (TORADOL) injection 60 mg      No orders of the defined types were placed in this encounter. Orders Placed This Encounter   Medications    methylPREDNISolone acetate (DEPO-MEDROL) injection 80 mg    ketorolac (TORADOL) 10 MG tablet     Sig: Take 1 tablet by mouth every 6 hours as needed for Pain     Dispense:  20 tablet     Refill:  0    ketorolac (TORADOL) injection 60 mg     There are no discontinued medications. Return if symptoms worsen or fail to improve. Reviewed with the patient/family: current clinical status & medications. Side effects of the medication prescribed today, as well as treatment plan/rationale and result expectations have been discussed with the patient/family who expresses understanding. Patient will be discharged home in stable condition. Follow up with PCP to evaluate treatment results or return if symptoms worsen or fail to improve. Discussed signs and symptoms which require immediate follow-up in ED/call to 911. Understanding verbalized.      I have reviewed the patient's medical history in detail and updated the computerized patient record.     Ashish Alfaro, APARNA - CNP

## 2021-03-30 NOTE — PATIENT INSTRUCTIONS
Patient Education        Back Pain: Care Instructions  Your Care Instructions     Back pain has many possible causes. It is often related to problems with muscles and ligaments of the back. It may also be related to problems with the nerves, discs, or bones of the back. Moving, lifting, standing, sitting, or sleeping in an awkward way can strain the back. Sometimes you don't notice the injury until later. Arthritis is another common cause of back pain. Although it may hurt a lot, back pain usually improves on its own within several weeks. Most people recover in 12 weeks or less. Using good home treatment and being careful not to stress your back can help you feel better sooner. Follow-up care is a key part of your treatment and safety. Be sure to make and go to all appointments, and call your doctor if you are having problems. It's also a good idea to know your test results and keep a list of the medicines you take. How can you care for yourself at home? · Sit or lie in positions that are most comfortable and reduce your pain. Try one of these positions when you lie down:  ? Lie on your back with your knees bent and supported by large pillows. ? Lie on the floor with your legs on the seat of a sofa or chair. ? Lie on your side with your knees and hips bent and a pillow between your legs. ? Lie on your stomach if it does not make pain worse. · Do not sit up in bed, and avoid soft couches and twisted positions. Bed rest can help relieve pain at first, but it delays healing. Avoid bed rest after the first day of back pain. · Change positions every 30 minutes. If you must sit for long periods of time, take breaks from sitting. Get up and walk around, or lie in a comfortable position. · Try using a heating pad on a low or medium setting for 15 to 20 minutes every 2 or 3 hours. Try a warm shower in place of one session with the heating pad.   · You can also try an ice pack for 10 to 15 minutes every 2 to 3 hours. Put a thin cloth between the ice pack and your skin. · Take pain medicines exactly as directed. ? If the doctor gave you a prescription medicine for pain, take it as prescribed. ? If you are not taking a prescription pain medicine, ask your doctor if you can take an over-the-counter medicine. · Take short walks several times a day. You can start with 5 to 10 minutes, 3 or 4 times a day, and work up to longer walks. Walk on level surfaces and avoid hills and stairs until your back is better. · Return to work and other activities as soon as you can. Continued rest without activity is usually not good for your back. · To prevent future back pain, do exercises to stretch and strengthen your back and stomach. Learn how to use good posture, safe lifting techniques, and proper body mechanics. When should you call for help? Call your doctor now or seek immediate medical care if:    · You have new or worsening numbness in your legs.     · You have new or worsening weakness in your legs. (This could make it hard to stand up.)     · You lose control of your bladder or bowels. Watch closely for changes in your health, and be sure to contact your doctor if:    · You have a fever, lose weight, or don't feel well.     · You do not get better as expected. Where can you learn more? Go to https://Catarizm.[employer]. org and sign in to your [email or web address] account. Enter O207 in the [facility] box to learn more about \"Back Pain: Care Instructions. \"     If you do not have an account, please click on the \"Sign Up Now\" link. Current as of: November 16, 2020               Content Version: 12.8  © 7431-8881 Healthwise, Incorporated. Care instructions adapted under license by [facility] ([facility]). If you have questions about a medical condition or this instruction, always ask your healthcare professional. [address] any warranty or liability for your use of this information. Patient Education        Healthy Upper Back: Exercises  Introduction  Here are some examples of exercises for your upper back. Start each exercise slowly. Ease off the exercise if you start to have pain. Your doctor or physical therapist will tell you when you can start these exercises and which ones will work best for you. How to do the exercises  Lower neck and upper back stretch   1. Stretch your arms out in front of your body. Clasp one hand on top of your other hand. 2. Gently reach out so that you feel your shoulder blades stretching away from each other. 3. Gently bend your head forward. 4. Hold for 15 to 30 seconds. 5. Repeat 2 to 4 times. Midback stretch   If you have knee pain, do not do this exercise. 1. Kneel on the floor, and sit back on your ankles. 2. Lean forward, place your hands on the floor, and stretch your arms out in front of you. Rest your head between your arms. 3. Gently push your chest toward the floor, reaching as far in front of you as possible. 4. Hold for 15 to 30 seconds. 5. Repeat 2 to 4 times. Shoulder rolls   1. Sit comfortably with your feet shoulder-width apart. You can also do this exercise while standing. 2. Roll your shoulders up, then back, and then down in a smooth, circular motion. 3. Repeat 2 to 4 times. Wall push-up   1. Stand against a wall with your feet about 12 to 24 inches back from the wall. If you feel any pain when you do this exercise, stand closer to the wall. 2. Place your hands on the wall slightly wider apart than your shoulders, and lean forward. 3. Gently lean your body toward the wall. Then push back to your starting position. Keep the motion smooth and controlled. 4. Repeat 8 to 12 times. Resisted shoulder blade squeeze   For this exercise, you will need elastic exercise material, such as surgical tubing or Thera-Band. 1. Sit or stand, holding the band in both hands in front of you.  Keep your elbows close to your sides, bent at a 90-degree angle. Your palms should face up. 2. Squeeze your shoulder blades together, and move your arms to the outside, stretching the band. Be sure to keep your elbows at your sides while you do this. 3. Relax. 4. Repeat 8 to 12 times. Resisted rows   For this exercise, you will need elastic exercise material, such as surgical tubing or Thera-Band. 1. Put the band around a solid object, such as a bedpost, at about waist level. Hold one end of the band in each hand. 2. With your elbows at your sides and bent to 90 degrees, pull the band back to move your shoulder blades toward each other. Return to the starting position. 3. Repeat 8 to 12 times. Follow-up care is a key part of your treatment and safety. Be sure to make and go to all appointments, and call your doctor if you are having problems. It's also a good idea to know your test results and keep a list of the medicines you take. Where can you learn more? Go to https://PlaySquare.[employer]. org and sign in to your [employer] account. Enter H078 in the [employer] box to learn more about \"Healthy Upper Back: Exercises. \"     If you do not have an account, please click on the \"Sign Up Now\" link. Current as of: November 16, 2020               Content Version: 12.8  © 4897-6542 Healthwise, Incorporated. Care instructions adapted under license by [facility] ([facility]). If you have questions about a medical condition or this instruction, always ask your healthcare professional. [place] [address] any warranty or liability for your use of this information.

## 2021-05-10 RX ORDER — CITALOPRAM 10 MG/1
TABLET ORAL
Qty: 30 TABLET | Refills: 0 | Status: SHIPPED | OUTPATIENT
Start: 2021-05-10 | End: 2021-06-11 | Stop reason: SDUPTHER

## 2021-06-03 RX ORDER — AMLODIPINE BESYLATE 2.5 MG/1
TABLET ORAL
Qty: 30 TABLET | Refills: 0 | Status: SHIPPED | OUTPATIENT
Start: 2021-06-03 | End: 2021-07-05

## 2021-06-10 RX ORDER — CITALOPRAM 10 MG/1
TABLET ORAL
Qty: 30 TABLET | Refills: 0 | OUTPATIENT
Start: 2021-06-10

## 2021-06-11 RX ORDER — CITALOPRAM 10 MG/1
TABLET ORAL
Qty: 30 TABLET | Refills: 0 | OUTPATIENT
Start: 2021-06-11

## 2021-06-11 RX ORDER — CITALOPRAM 10 MG/1
TABLET ORAL
Qty: 30 TABLET | Refills: 0 | Status: SHIPPED | OUTPATIENT
Start: 2021-06-11 | End: 2021-07-12 | Stop reason: SDUPTHER

## 2021-06-11 NOTE — TELEPHONE ENCOUNTER
needs refill before next scheduled visit of 06/30 sent, citalopram (CELEXA) 10 MG tablet, to [address] [place] [address] [address] - P [phone] - F [phone],, [phone], [facility]

## 2021-06-30 ENCOUNTER — TELEPHONE (OUTPATIENT)
Dept: FAMILY MEDICINE CLINIC | Age: 31
End: 2021-06-30

## 2021-07-05 RX ORDER — AMLODIPINE BESYLATE 2.5 MG/1
TABLET ORAL
Qty: 30 TABLET | Refills: 0 | Status: SHIPPED | OUTPATIENT
Start: 2021-07-05 | End: 2021-07-12 | Stop reason: SDUPTHER

## 2021-07-05 NOTE — TELEPHONE ENCOUNTER
Rx requested:  Requested Prescriptions     Pending Prescriptions Disp Refills    amLODIPine (NORVASC) 2.5 MG tablet [Pharmacy Med Name: amLODIPine Besylate Oral Tablet 2.5 MG] 30 tablet 0     Sig: TAKE 1 TABLET BY MOUTH EVERY DAY       Last Office Visit:   6/23/2020        Next Visit Date:  No future appointments.

## 2021-07-12 ENCOUNTER — E-VISIT (OUTPATIENT)
Dept: FAMILY MEDICINE CLINIC | Age: 31
End: 2021-07-12
Payer: COMMERCIAL

## 2021-07-12 PROCEDURE — 99421 OL DIG E/M SVC 5-10 MIN: CPT | Performed by: FAMILY MEDICINE

## 2021-07-12 RX ORDER — CITALOPRAM 10 MG/1
TABLET ORAL
Qty: 30 TABLET | Refills: 3 | Status: SHIPPED | OUTPATIENT
Start: 2021-07-12 | End: 2021-07-20 | Stop reason: SDUPTHER

## 2021-07-12 RX ORDER — AMLODIPINE BESYLATE 2.5 MG/1
TABLET ORAL
Qty: 30 TABLET | Refills: 3 | Status: SHIPPED | OUTPATIENT
Start: 2021-07-12 | End: 2021-07-20 | Stop reason: SDUPTHER

## 2021-07-12 ASSESSMENT — ANXIETY QUESTIONNAIRES
6. BECOMING EASILY ANNOYED OR IRRITABLE: 1-SEVERAL DAYS
3. WORRYING TOO MUCH ABOUT DIFFERENT THINGS: SEVERAL DAYS
7. FEELING AFRAID AS IF SOMETHING AWFUL MIGHT HAPPEN: 1-SEVERAL DAYS
2. NOT BEING ABLE TO STOP OR CONTROL WORRYING: NOT AT ALL
1. FEELING NERVOUS, ANXIOUS, OR ON EDGE: 3-NEARLY EVERY DAY
GAD7 TOTAL SCORE: 0
5. BEING SO RESTLESS THAT IT IS HARD TO SIT STILL: NOT AT ALL
4. TROUBLE RELAXING: 0-NOT AT ALL
6. BECOMING EASILY ANNOYED OR IRRITABLE: SEVERAL DAYS
1. FEELING NERVOUS, ANXIOUS, OR ON EDGE: NEARLY EVERY DAY
7. FEELING AFRAID AS IF SOMETHING AWFUL MIGHT HAPPEN: SEVERAL DAYS
3. WORRYING TOO MUCH ABOUT DIFFERENT THINGS: 1-SEVERAL DAYS
2. NOT BEING ABLE TO STOP OR CONTROL WORRYING: 0-NOT AT ALL
5. BEING SO RESTLESS THAT IT IS HARD TO SIT STILL: 0-NOT AT ALL
GAD7 TOTAL SCORE: 6
4. TROUBLE RELAXING: NOT AT ALL

## 2021-07-12 ASSESSMENT — PATIENT HEALTH QUESTIONNAIRE - PHQ9
SUM OF ALL RESPONSES TO PHQ QUESTIONS 1-9: 6
9. THOUGHTS THAT YOU WOULD BE BETTER OFF DEAD, OR OF HURTING YOURSELF: 0
SUM OF ALL RESPONSES TO PHQ9 QUESTIONS 1 & 2: 2
8. MOVING OR SPEAKING SO SLOWLY THAT OTHER PEOPLE COULD HAVE NOTICED. OR THE OPPOSITE - BEING SO FIDGETY OR RESTLESS THAT YOU HAVE BEEN MOVING AROUND A LOT MORE THAN USUAL: NOT AT ALL
8. MOVING OR SPEAKING SO SLOWLY THAT OTHER PEOPLE COULD HAVE NOTICED. OR THE OPPOSITE, BEING SO FIGETY OR RESTLESS THAT YOU HAVE BEEN MOVING AROUND A LOT MORE THAN USUAL: 0
SUM OF ALL RESPONSES TO PHQ QUESTIONS 1-9: 6
5. POOR APPETITE OR OVEREATING: NEARLY EVERY DAY
10. IF YOU CHECKED OFF ANY PROBLEMS, HOW DIFFICULT HAVE THESE PROBLEMS MADE IT FOR YOU TO DO YOUR WORK, TAKE CARE OF THINGS AT HOME, OR GET ALONG WITH OTHER PEOPLE: SOMEWHAT DIFFICULT
2. FEELING DOWN, DEPRESSED OR HOPELESS: SEVERAL DAYS
4. FEELING TIRED OR HAVING LITTLE ENERGY: NOT AT ALL
7. TROUBLE CONCENTRATING ON THINGS, SUCH AS READING THE NEWSPAPER OR WATCHING TELEVISION: 1
5. POOR APPETITE OR OVEREATING: 3
3. TROUBLE FALLING OR STAYING ASLEEP: NOT AT ALL
1. LITTLE INTEREST OR PLEASURE IN DOING THINGS: SEVERAL DAYS
4. FEELING TIRED OR HAVING LITTLE ENERGY: 0
2. FEELING DOWN, DEPRESSED OR HOPELESS: 1
1. LITTLE INTEREST OR PLEASURE IN DOING THINGS: 1
9. THOUGHTS THAT YOU WOULD BE BETTER OFF DEAD, OR OF HURTING YOURSELF: NOT AT ALL
SUM OF ALL RESPONSES TO PHQ QUESTIONS 1-9: 6
7. TROUBLE CONCENTRATING ON THINGS, SUCH AS READING THE NEWSPAPER OR WATCHING TELEVISION: SEVERAL DAYS
6. FEELING BAD ABOUT YOURSELF - OR THAT YOU ARE A FAILURE OR HAVE LET YOURSELF OR YOUR FAMILY DOWN: NOT AT ALL
6. FEELING BAD ABOUT YOURSELF - OR THAT YOU ARE A FAILURE OR HAVE LET YOURSELF OR YOUR FAMILY DOWN: 0

## 2021-07-12 ASSESSMENT — PATIENT HEALTH QUESTIONNAIRE - GENERAL
HAVE YOU BEEN EXPERIENCING NEW OR WORSENING MUSCLE TWITCHING, SHAKINESS, OR OTHER UNUSUAL CHANGES IN YOUR MUSCLE MOVEMENT: N
SINCE YOUR LAST VISIT, HAVE YOU EXPERIENCED EPISODES OF FAINTING OR NEAR FAINTING: N
DO YOU HAVE ANY NEW RASHES OR UNEXPLAINED ITCHING OR SWELLING: N
HAVE YOU BEEN EXPERIENCING RACING THOUGHTS OR IMPULSIVE BEHAVIOR: N
HAVE YOU BEEN EXPERIENCING AN UNUSUALLY DRY MOUTH: N
HAVE YOU BEEN EXPERIENCING ABDOMINAL DISCOMFORT, NAUSEA OR CHANGES IN YOUR BOWEL PATTERN: N
HAVE YOU BEEN EXPERIENCING NEW OR WORSENING DIFFICULTY WITH URINATION OR CHANGE IN URINARY PATTERN: N
HAVE YOU BEEN EXPERIENCING NEW OR WORSENING HEADACHES: N
HAVE YOU BEEN EXPERIENCING VERY LOW OR VERY HIGH MOODS: N
HAVE YOU BEEN EXPERIENCING NEW OR WORSENING VISUAL CHANGES: N
HAVE YOU BEEN EXPERIENCING INVOLUNTARY WEIGHT GAIN OR LOSS: N
HAVE YOU BEEN EXPERIENCING VIVID DREAMS OR NIGHTMARES THAT INTERFERE WITH YOUR SLEEP: N
HAVE YOU BEEN EXPERIENCING HALLUCINATIONS OR CONFUSION: N
HAVE YOU BEEN EXPERIENCING LIGHT HEADEDNESS, WOOZINESS, OR DIZZINESS, ESPECIALLY UPON STANDING FROM SITTING OR LYING POSITIONS: N
DO YOU HAVE A FASTER HEART RATE THAN USUAL, DOES YOUR HEART RATE FEEL IRREGULAR OR DO YOU FEEL LIKE YOUR HEART IS POUNDING AT REST: N
HAVE YOU BEEN EXPERIENCING UNEXPLAINED HIGH FEVERS OR EXCESSIVE SWEATING: N
HAVE YOU BEEN EXPERIENCING NEW OR WORSENING PROBLEMS WITH YOUR SEXUAL FUNCTION: N

## 2021-07-20 ENCOUNTER — VIRTUAL VISIT (OUTPATIENT)
Dept: FAMILY MEDICINE CLINIC | Age: 31
End: 2021-07-20
Payer: COMMERCIAL

## 2021-07-20 DIAGNOSIS — E78.6 LOW HDL (UNDER 40): ICD-10-CM

## 2021-07-20 DIAGNOSIS — R79.89 LOW VITAMIN D LEVEL: ICD-10-CM

## 2021-07-20 DIAGNOSIS — F32.A DEPRESSION, UNSPECIFIED DEPRESSION TYPE: Primary | ICD-10-CM

## 2021-07-20 DIAGNOSIS — I10 ESSENTIAL HYPERTENSION: ICD-10-CM

## 2021-07-20 DIAGNOSIS — L85.3 XEROSIS OF SKIN: ICD-10-CM

## 2021-07-20 PROCEDURE — G8427 DOCREV CUR MEDS BY ELIG CLIN: HCPCS | Performed by: INTERNAL MEDICINE

## 2021-07-20 PROCEDURE — 99214 OFFICE O/P EST MOD 30 MIN: CPT | Performed by: INTERNAL MEDICINE

## 2021-07-20 RX ORDER — AMMONIUM LACTATE 12 G/100G
LOTION TOPICAL
Qty: 1 BOTTLE | Refills: 0 | Status: SHIPPED | OUTPATIENT
Start: 2021-07-20 | End: 2021-09-21 | Stop reason: CLARIF

## 2021-07-20 RX ORDER — CITALOPRAM 10 MG/1
TABLET ORAL
Qty: 90 TABLET | Refills: 0 | Status: SHIPPED | OUTPATIENT
Start: 2021-07-20 | End: 2021-11-03

## 2021-07-20 RX ORDER — AMLODIPINE BESYLATE 2.5 MG/1
TABLET ORAL
Qty: 90 TABLET | Refills: 0 | Status: SHIPPED | OUTPATIENT
Start: 2021-07-20 | End: 2021-11-03

## 2021-07-20 NOTE — PROGRESS NOTES
Subjective:      Patient ID: [place] Herb is a 32 y.o. male who presents today with:  No chief complaint on file. HPI    Depression f/u  Citalopram helping  Helps with panic attacks  Still some depression  No recent panic attacks  No si/hi  HTN  On amlodipine. Dry on feet  No redness  No warmth  Thinks it's dry skin  But not going away. Past Medical History:   Diagnosis Date    Anxiety     Depression     Essential hypertension 10/10/2017    Jejunal intussusception ([address]     Nonintractable migraine 10/10/2017     No past surgical history on file. Social History     Socioeconomic History    Marital status:      Spouse name: Not on file    Number of children: Not on file    Years of education: Not on file    Highest education level: Not on file   Occupational History    Not on file   Tobacco Use    Smoking status: Former Smoker     Packs/day: 0.50     Years: 5.00     Pack years: 2.50     Quit date: 2019     Years since quittin.5    Smokeless tobacco: Never Used   Vaping Use    Vaping Use: Every day    Start date: 2019    Substances: Always   Substance and Sexual Activity    Alcohol use: No    Drug use: Yes     Types: Marijuana    Sexual activity: Not on file   Other Topics Concern    Not on file   Social History Narrative    Not on file     Social Determinants of Health     Financial Resource Strain:     Difficulty of Paying Living Expenses:    Food Insecurity:     Worried About Running Out of Food in the Last Year:     [address] in the Last Year:    Transportation Needs:     Lack of Transportation (Medical):      Lack of Transportation (Non-Medical):    Physical Activity:     Days of Exercise per Week:     Minutes of Exercise per Session:    Stress:     Feeling of Stress :    Social Connections:     Frequency of Communication with Friends and Family:     Frequency of Social Gatherings with Friends and Family:     Attends Confucianism Services:     Active Member of Clubs or Organizations:     Attends Club or Organization Meetings:     Marital Status:    Intimate Partner Violence:     Fear of Current or Ex-Partner:     Emotionally Abused:     Physically Abused:     Sexually Abused: Allergies   Allergen Reactions    Amoxil [Amoxicillin]      unknow to pt happens happens when he takes it    Buspar [Buspirone]      Groggy     Lexapro [Escitalopram Oxalate]      groggy    Zoloft [Sertraline Hcl]      Lost effectiveness      Current Outpatient Medications on File Prior to Visit   Medication Sig Dispense Refill    amLODIPine (NORVASC) 2.5 MG tablet TAKE 1 TABLET BY MOUTH EVERY DAY 30 tablet 3    citalopram (CELEXA) 10 MG tablet TAKE 1 TABLET BY MOUTH EVERY DAY 30 tablet 3     No current facility-administered medications on file prior to visit. I have personally reviewed the ROS, PMH, PFH, and social history     Review of Systems    Objective: There were no vitals taken for this visit. Physical Exam  Constitutional:       General: He is not in acute distress. Appearance: He is not ill-appearing, toxic-appearing or diaphoretic. Pulmonary:      Effort: Pulmonary effort is normal. No respiratory distress. Assessment:       Diagnosis Orders   1. Depression, unspecified depression type     2. Essential hypertension           Plan:     Video visit  visit done because of coronavirus pandemic. Doxy. Me used  explained billeable visit, patient understands and agrees to continue  I was at home and patient was at home during this visit. Refusing covid vaccine for now. Continue chronic medications. Verify FH   . If not better see derm.                        No orders of the defined types were placed in this encounter. No orders of the defined types were placed in this encounter. No SI/HI.    Doesn't want to change citalopram   Because he wants to work out   Please stop in and a get a blood pressure check  Last bp check was 130/82   If anything should change or worsen call ASAP, don't wait for next scheduled appointment. Return in about 6 weeks (around 8/31/2021) for Chronic condition management/appointment, worsening symptoms, call ASAP for appointment.       Kwesi Jenkins MD

## 2021-09-04 ENCOUNTER — OFFICE VISIT (OUTPATIENT)
Dept: FAMILY MEDICINE CLINIC | Age: 31
End: 2021-09-04
Payer: COMMERCIAL

## 2021-09-04 VITALS
DIASTOLIC BLOOD PRESSURE: 72 MMHG | SYSTOLIC BLOOD PRESSURE: 142 MMHG | TEMPERATURE: 98.5 F | RESPIRATION RATE: 18 BRPM | HEART RATE: 72 BPM

## 2021-09-04 DIAGNOSIS — S76.912A MUSCLE STRAIN OF LEFT THIGH, INITIAL ENCOUNTER: Primary | ICD-10-CM

## 2021-09-04 PROBLEM — G43.909 MIGRAINE HEADACHE: Status: ACTIVE | Noted: 2017-10-10

## 2021-09-04 PROBLEM — F32.A DEPRESSIVE DISORDER: Status: ACTIVE | Noted: 2017-11-10

## 2021-09-04 PROCEDURE — 96372 THER/PROPH/DIAG INJ SC/IM: CPT

## 2021-09-04 PROCEDURE — 1036F TOBACCO NON-USER: CPT

## 2021-09-04 PROCEDURE — G8417 CALC BMI ABV UP PARAM F/U: HCPCS

## 2021-09-04 PROCEDURE — 99213 OFFICE O/P EST LOW 20 MIN: CPT

## 2021-09-04 PROCEDURE — G8427 DOCREV CUR MEDS BY ELIG CLIN: HCPCS

## 2021-09-04 RX ORDER — KETOROLAC TROMETHAMINE 30 MG/ML
30 INJECTION, SOLUTION INTRAMUSCULAR; INTRAVENOUS ONCE
Status: COMPLETED | OUTPATIENT
Start: 2021-09-04 | End: 2021-09-04

## 2021-09-04 RX ORDER — CYCLOBENZAPRINE HCL 10 MG
10 TABLET ORAL 3 TIMES DAILY PRN
Qty: 12 TABLET | Refills: 0 | Status: SHIPPED | OUTPATIENT
Start: 2021-09-04 | End: 2021-09-07 | Stop reason: CLARIF

## 2021-09-04 RX ORDER — IBUPROFEN 600 MG/1
600 TABLET ORAL 3 TIMES DAILY PRN
Qty: 21 TABLET | Refills: 0 | Status: SHIPPED | OUTPATIENT
Start: 2021-09-04 | End: 2021-09-07 | Stop reason: CLARIF

## 2021-09-04 RX ADMIN — KETOROLAC TROMETHAMINE 30 MG: 30 INJECTION, SOLUTION INTRAMUSCULAR; INTRAVENOUS at 13:28

## 2021-09-04 ASSESSMENT — ENCOUNTER SYMPTOMS
BACK PAIN: 0
COLOR CHANGE: 0

## 2021-09-04 NOTE — PROGRESS NOTES
[address] [facility] Encounter  CHIEF COMPLAINT       Chief Complaint   Patient presents with   Jamari Boston is a 32 y.o. male who presents with:  Leg Pain   The incident occurred 12 to 24 hours ago. The incident occurred at home. There was no injury mechanism. The pain is present in the right leg. The quality of the pain is described as cramping and shooting. The pain is at a severity of 8/10. Associated symptoms include an inability to bear weight. Pertinent negatives include no numbness. Nothing aggravates the symptoms. He has tried acetaminophen for the symptoms. REVIEW OF SYSTEMS     Review of Systems   Constitutional: Negative for activity change, chills, diaphoresis, fatigue and fever. Musculoskeletal: Positive for arthralgias and myalgias. Negative for back pain, gait problem, joint swelling, neck pain and neck stiffness. Skin: Negative for color change, pallor, rash and wound. Neurological: Negative for dizziness, tremors, syncope, weakness, light-headedness, numbness and headaches. PAST MEDICAL HISTORY         Diagnosis Date    Anxiety     Depression     Essential hypertension 10/10/2017    [address]unal [address]ception ([facility] [address]     Nonintractable migraine 10/10/2017     SURGICAL HISTORY     Patient  has no past surgical history on file. CURRENT MEDICATIONS       Previous Medications    AMLODIPINE (NORVASC) 2.5 MG TABLET    TAKE 1 TABLET BY MOUTH EVERY DAY    AMMONIUM LACTATE (LAC-HYDRIN) 12 % LOTION    Apply  Thin layer topically to dry areas on feet    CITALOPRAM (CELEXA) 10 MG TABLET    TAKE 1 TABLET BY MOUTH EVERY DAY     ALLERGIES     Patient is is allergic to amoxil [amoxicillin], buspar [buspirone], lexapro [escitalopram oxalate], and zoloft [sertraline hcl]. FAMILY HISTORY     Patient'sfamily history includes High Blood Pressure in his father; Mental Illness in his father;  Other in his father. HISTORY     Patient  reports that he quit smoking about 20 months ago. He has a 2.50 pack-year smoking history. He has never used smokeless tobacco. He reports current drug use. Drug: Marijuana. He reports that he does not drink alcohol. PHYSICAL EXAM     VITALS  BP: (!) 142/72, Temp: 98.5 °F (36.9 °C), Pulse: 72, Resp: 18,    Physical Exam  Constitutional:       General: He is not in acute distress. Appearance: Normal appearance. He is not ill-appearing or diaphoretic. Musculoskeletal:         General: Tenderness present. No swelling, deformity or signs of injury. Normal range of motion. Skin:     General: Skin is warm and dry. Capillary Refill: Capillary refill takes less than 2 seconds. Coloration: Skin is not jaundiced or pale. Findings: No bruising, erythema, lesion or rash. Neurological:      Mental Status: He is alert and oriented to person, place, and time. Mental status is at baseline. Sensory: No sensory deficit. Motor: No weakness. Coordination: Coordination normal.      Gait: Gait normal.       READY CARE COURSE   No orders of the defined types were placed in this encounter. Labs:  No results found for this visit on 09/04/21. IMAGING:  No orders to display     Scheduled Meds:  Continuous Infusions:  PRN Meds:. PROCEDURES:  FINAL IMPRESSION      1. Muscle strain of left thigh, initial encounter        DISPOSITION/PLAN   59-year-old male reporting pain in left leg beginning last night that is moderate continuous, reports pain radiates all the way to lower leg from the thigh. Also reporting muscle cramping in the thigh. Patient reports repetative motion at work that involves bending. Pt is afebrile has nontoxic appearance and VS are stable. On physical exam patient has point tenderness over the left upper thigh area. Full range of motion, there are no signs of injury no abrasions no bruising. Consistent with muscle strain.  Patient given Toradol IM at time of visit, prescription for ibuprofen and Flexeril sent to patient pharmacy. Patient educated on use of NSAIDs. To take with food use minimum dose necessary. PATIENT REFERRED TO:  Return if symptoms worsen or fail to improve, for Follow up with PCP. DISCHARGE MEDICATIONS:  New Prescriptions    CYCLOBENZAPRINE (FLEXERIL) 10 MG TABLET    Take 1 tablet by mouth 3 times daily as needed for Muscle spasms    IBUPROFEN (ADVIL;MOTRIN) 600 MG TABLET    Take 1 tablet by mouth 3 times daily as needed for Pain     Cannot display discharge medications since this is not an admission.        Carolynn Arias, APRN - CNP

## 2021-09-04 NOTE — PATIENT INSTRUCTIONS
Patient Education        Muscle Strain: Care Instructions  Your Care Instructions     A muscle strain happens when you overstretch, or pull, a muscle. It can happen when you exercise or lift something or when you have an accident. Rest and other home care can help the muscle heal.  Follow-up care is a key part of your treatment and safety. Be sure to make and go to all appointments, and call your doctor if you are having problems. It's also a good idea to know your test results and keep a list of the medicines you take. How can you care for yourself at home? · Rest the strained muscle. Do not put weight on it for a day or two. If your doctor advises you to, use crutches or a sling to rest a sore limb. · Put ice or a cold pack on the sore muscle for 10 to 20 minutes at a time to stop swelling. Put a thin cloth between the ice pack and your skin. · Prop up the sore arm or leg on a pillow when you ice it or anytime you sit or lie down during the next 3 days. Try to keep it above the level of your heart. This will help reduce swelling. · Take pain medicines exactly as directed. ? If the doctor gave you a prescription medicine for pain, take it as prescribed. ? If you are not taking a prescription pain medicine, ask your doctor if you can take an over-the-counter medicine. · Do not do anything that makes the pain worse. Return to exercise gradually as you feel better. When should you call for help? Call your doctor now or seek immediate medical care if:    · You have new severe pain.     · Your injured limb is cool or pale or changes color.     · You have tingling, weakness, or numbness in your injured limb.     · You cannot move the injured area. Watch closely for changes in your health, and be sure to contact your doctor if:    · You cannot put weight on a joint, or it feels unsteady when you walk.     · Pain and swelling get worse or do not start to get better after 2 days of home treatment.    Where can you learn more? Go to https://chpepiceweb.[employer]. org and sign in to your [employer] account. Enter Z073 in the [facility] box to learn more about \"Muscle Strain: Care Instructions. \"     If you do not have an account, please click on the \"Sign Up Now\" link. Current as of: November 16, 2020               Content Version: 12.9  © 2006-2021 [employer]. Care instructions adapted under license by [facility] ([facility]). If you have questions about a medical condition or this instruction, always ask your healthcare professional. [place] [address] any warranty or liability for your use of this information. Patient Education        Learning About RICE (Rest, Ice, Compression, and Elevation)  What is RICE? RICE is a way to care for an injury. RICE helps relieve pain and swelling. It may also help with healing and flexibility. RICE stands for:  · R est and protect the injured or sore area. · I ce or a cold pack used as soon as possible. · C ompression, or wrapping the injured or sore area with an elastic bandage. · E levation (propping up) the injured or sore area. How do you do RICE? You can use RICE for home treatment when you have general aches and pains or after an injury or surgery. Rest  · Do not put weight on the injury for at least 24 to 48 hours. · Use crutches for a badly sprained knee or ankle. · Support a sprained wrist, elbow, or shoulder with a sling. Ice  · Put ice or a cold pack on the injury right away to reduce pain and swelling. Frozen vegetables will also work as an ice pack. Put a thin cloth between the ice or cold pack and your skin. The cloth protects the injured area from getting too cold. · Use ice for 10 to 15 minutes at a time for the first 48 to 72 hours. Compression  · Use compression for sprains, strains, and surgeries of the arms and legs.   · Wrap the injured area with an elastic bandage or compression sleeve to reduce swelling. · Don't wrap it too tightly. If the area below it feels numb, tingles, or feels cool, loosen the wrap. Elevation  · Use elevation for areas of the body that can be propped up, such as arms and legs. · Prop up the injured area on pillows whenever you use ice. Keep it propped up anytime you sit or lie down. · Try to keep the injured area at or above the level of your heart. This will help reduce swelling and bruising. Where can you learn more? Go to https://PersonallypeUpkeep Charlieeweb.UeeeU.com. org and sign in to your [employer] account. Enter H249 in the [employer] box to learn more about \"Learning About RICE (Rest, Ice, Compression, and Elevation). \"     If you do not have an account, please click on the \"Sign Up Now\" link. Current as of: November 16, 2020               Content Version: 12.9  © 2006-2021 Healthwise, [facility]. Care instructions adapted under license by [facility] ([facility]). If you have questions about a medical condition or this instruction, always ask your healthcare professional. [place] [address] any warranty or liability for your use of this information.

## 2021-09-05 ENCOUNTER — HOSPITAL ENCOUNTER (EMERGENCY)
Age: 31
Discharge: HOME HEALTH | End: 2021-09-05
Payer: COMMERCIAL

## 2021-09-05 VITALS
HEART RATE: 114 BPM | DIASTOLIC BLOOD PRESSURE: 98 MMHG | SYSTOLIC BLOOD PRESSURE: 144 MMHG | HEIGHT: 66 IN | BODY MASS INDEX: 38.57 KG/M2 | WEIGHT: 240 LBS | TEMPERATURE: 100.1 F | RESPIRATION RATE: 22 BRPM | OXYGEN SATURATION: 96 %

## 2021-09-05 ASSESSMENT — PAIN DESCRIPTION - ORIENTATION: ORIENTATION: LEFT

## 2021-09-05 ASSESSMENT — PAIN DESCRIPTION - DESCRIPTORS: DESCRIPTORS: SHARP

## 2021-09-05 ASSESSMENT — PAIN DESCRIPTION - PAIN TYPE: TYPE: ACUTE PAIN

## 2021-09-05 ASSESSMENT — PAIN DESCRIPTION - LOCATION: LOCATION: LEG

## 2021-09-05 ASSESSMENT — PAIN SCALES - GENERAL: PAINLEVEL_OUTOF10: 10

## 2021-09-06 NOTE — ED NOTES
Called patient back to room  Patient not in waiting room or outside      Syeda Mason, [facility]  09/05/21 2251

## 2021-09-06 NOTE — ED TRIAGE NOTES
Patient presents to the er with complaints of left leg pain   States that this has been going on for the past 2 days  Was seen at the urgent care and given flexeril and Toradol   Denies incontinence of bowel and bladder

## 2021-09-07 ENCOUNTER — OFFICE VISIT (OUTPATIENT)
Dept: FAMILY MEDICINE CLINIC | Age: 31
End: 2021-09-07
Payer: COMMERCIAL

## 2021-09-07 VITALS
SYSTOLIC BLOOD PRESSURE: 128 MMHG | HEIGHT: 66 IN | DIASTOLIC BLOOD PRESSURE: 86 MMHG | BODY MASS INDEX: 38.57 KG/M2 | WEIGHT: 240 LBS | OXYGEN SATURATION: 98 % | HEART RATE: 83 BPM | TEMPERATURE: 98.7 F

## 2021-09-07 DIAGNOSIS — R73.9 HYPERGLYCEMIA: ICD-10-CM

## 2021-09-07 DIAGNOSIS — M79.605 LEFT LEG PAIN: Primary | ICD-10-CM

## 2021-09-07 LAB — HBA1C MFR BLD: 5.7 %

## 2021-09-07 PROCEDURE — G8427 DOCREV CUR MEDS BY ELIG CLIN: HCPCS | Performed by: INTERNAL MEDICINE

## 2021-09-07 PROCEDURE — 1036F TOBACCO NON-USER: CPT | Performed by: INTERNAL MEDICINE

## 2021-09-07 PROCEDURE — 99214 OFFICE O/P EST MOD 30 MIN: CPT | Performed by: INTERNAL MEDICINE

## 2021-09-07 PROCEDURE — G8417 CALC BMI ABV UP PARAM F/U: HCPCS | Performed by: INTERNAL MEDICINE

## 2021-09-07 PROCEDURE — 83036 HEMOGLOBIN GLYCOSYLATED A1C: CPT | Performed by: INTERNAL MEDICINE

## 2021-09-07 RX ORDER — TIZANIDINE 4 MG/1
4 TABLET ORAL NIGHTLY PRN
Qty: 14 TABLET | Refills: 0 | Status: SHIPPED | OUTPATIENT
Start: 2021-09-07 | End: 2021-09-21 | Stop reason: CLARIF

## 2021-09-07 RX ORDER — PREDNISONE 10 MG/1
TABLET ORAL
Qty: 21 TABLET | Refills: 0 | Status: SHIPPED | OUTPATIENT
Start: 2021-09-07 | End: 2021-09-21 | Stop reason: CLARIF

## 2021-09-07 RX ORDER — LIDOCAINE 50 MG/G
1 PATCH TOPICAL DAILY
Qty: 30 PATCH | Refills: 0 | Status: SHIPPED | OUTPATIENT
Start: 2021-09-07 | End: 2021-09-21 | Stop reason: CLARIF

## 2021-09-07 SDOH — ECONOMIC STABILITY: FOOD INSECURITY: WITHIN THE PAST 12 MONTHS, YOU WORRIED THAT YOUR FOOD WOULD RUN OUT BEFORE YOU GOT MONEY TO BUY MORE.: NEVER TRUE

## 2021-09-07 SDOH — ECONOMIC STABILITY: TRANSPORTATION INSECURITY
IN THE PAST 12 MONTHS, HAS LACK OF TRANSPORTATION KEPT YOU FROM MEETINGS, WORK, OR FROM GETTING THINGS NEEDED FOR DAILY LIVING?: NO

## 2021-09-07 SDOH — ECONOMIC STABILITY: FOOD INSECURITY: WITHIN THE PAST 12 MONTHS, THE FOOD YOU BOUGHT JUST DIDN'T LAST AND YOU DIDN'T HAVE MONEY TO GET MORE.: NEVER TRUE

## 2021-09-07 SDOH — ECONOMIC STABILITY: TRANSPORTATION INSECURITY
IN THE PAST 12 MONTHS, HAS THE LACK OF TRANSPORTATION KEPT YOU FROM MEDICAL APPOINTMENTS OR FROM GETTING MEDICATIONS?: NO

## 2021-09-07 ASSESSMENT — ENCOUNTER SYMPTOMS
EYE PAIN: 0
BACK PAIN: 0
ABDOMINAL PAIN: 0
SHORTNESS OF BREATH: 0

## 2021-09-07 ASSESSMENT — SOCIAL DETERMINANTS OF HEALTH (SDOH): HOW HARD IS IT FOR YOU TO PAY FOR THE VERY BASICS LIKE FOOD, HOUSING, MEDICAL CARE, AND HEATING?: NOT HARD AT ALL

## 2021-09-07 NOTE — PATIENT INSTRUCTIONS
Patient Education        Iliotibial Band Syndrome: Care Instructions  Your Care Instructions  Iliotibial band syndrome is pain and swelling of the iliotibial band (also called the IT band). This is a band of tissue that runs down the outside of your thigh. It connects the side of your hip to the side of your knee. It helps keep your knee and hip stable and in their normal position. When you have IT band syndrome, you may feel pain on the outside of your hip. It happens as your IT band snaps back and forth over the bony point of your hip. Sometimes you may only feel pain on the outside of your knee. You can get this syndrome if the IT band is too tight or if you do certain activities over and over that put pressure on your hip or knee. This is a common problem in runners, cyclists, and people who do other aerobic activities. IT band syndrome is treated with rest and medicines. These relieve swelling and pain. Physical therapy is also used. It may include stretching or doing certain exercises that can help strengthen your IT band and hip muscles. Sometimes a steroid shot is given to help relieve pain at the spot that is most sore. Follow-up care is a key part of your treatment and safety. Be sure to make and go to all appointments, and call your doctor if you are having problems. It's also a good idea to know your test results and keep a list of the medicines you take. How can you care for yourself at home? · Stay at a healthy weight. Being overweight puts extra strain on your hip and knee joints. · Take pain medicines exactly as directed. ? If the doctor gave you a prescription medicine for pain, take it as prescribed. ? If you are not taking a prescription pain medicine, ask your doctor if you can take an over-the-counter medicine. · Talk to your doctor or physical therapist about exercises that will help ease hip and knee pain. ? Stretch before you exercise. This can help prevent stiffness and injury. You can try gentle forms of yoga to help keep your joints and muscles flexible. ? Use exercises that are less stressful on the joints. Walk instead of jog. Ride a stationary bike with little resistance. Or you can swim or try water exercise. ? Do exercises that can help strengthen your IT band and hip muscles. Your doctor or physical therapist can tell you what kind of exercises are best for you. He or she can help you learn the right way to do the exercises. When should you call for help? Watch closely for changes in your health, and be sure to contact your doctor if:    · You have pain in your hip or knee that doesn't go away.     · You do not get better as expected. Where can you learn more? Go to https://[employer]. org and sign in to your [employer] account. Enter L449 in the [employer] box to learn more about \"Iliotibial Band Syndrome: Care Instructions. \"     If you do not have an account, please click on the \"Sign Up Now\" link. Current as of: November 16, 2020               Content Version: 12.9  © 2006-2021 [email or web address]. Care instructions adapted under license by [facility] ([facility]). If you have questions about a medical condition or this instruction, always ask your healthcare professional. [address] any warranty or liability for your use of this information. Patient Education        Iliotibial Band Syndrome: Exercises  Introduction  Here are some examples of exercises for you to try. The exercises may be suggested for a condition or for rehabilitation. Start each exercise slowly. Ease off the exercises if you start to have pain. You will be told when to start these exercises and which ones will work best for you. How to do the exercises  Iliotibial band stretch   1. Lean sideways against a wall. If you are not steady on your feet, hold on to a chair or counter.   2. Stand on the leg with the affected hip, with that leg close to the wall. Then cross your other leg in front of it. 3. Let your affected hip drop out to the side of your body and against the wall. Then lean away from your affected hip until you feel a stretch. 4. Hold the stretch for 15 to 30 seconds. 5. Repeat 2 to 4 times. Piriformis stretch   1. Lie on your back with your legs straight. 2. Lift your affected leg and bend your knee. With your opposite hand, reach across your body, and then gently pull your knee toward your opposite shoulder. 3. Hold the stretch for 15 to 30 seconds. 4. Repeat 2 to 4 times. Hamstring wall stretch   1. Lie on your back in a doorway, with your good leg through the open door. 2. Slide your affected leg up the wall to straighten your knee. You should feel a gentle stretch down the back of your leg. 3. Hold the stretch for at least 1 minute to begin. Then try to lengthen the time you hold the stretch to as long as 6 minutes. 4. Repeat 2 to 4 times. 5. If you do not have a place to do this exercise in a doorway, there is another way to do it:  6. Lie on your back, and bend the knee of your affected leg. 7. Loop a towel under the ball and toes of that foot, and hold the ends of the towel in your hands. 8. Straighten your knee, and slowly pull back on the towel. You should feel a gentle stretch down the back of your leg. 9. Hold the stretch for 15 to 30 seconds. Or even better, hold the stretch for 1 minute if you can. 10. Repeat 2 to 4 times. 1. Do not arch your back. 2. Do not bend either knee. 3. Keep one heel touching the floor and the other heel touching the wall. Do not point your toes. Follow-up care is a key part of your treatment and safety. Be sure to make and go to all appointments, and call your doctor if you are having problems. It's also a good idea to know your test results and keep a list of the medicines you take. Where can you learn more? Go to https://ciro.health-partners. org and sign in to your [employer] account. Enter P252 in the [facility] box to learn more about \"Iliotibial Band Syndrome: Exercises. \"     If you do not have an account, please click on the \"Sign Up Now\" link. Current as of: November 16, 2020               Content Version: 12.9  © 9325-1991 Healthwise, [employer]. Care instructions adapted under license by [address] If you have questions about a medical condition or this instruction, always ask your healthcare professional. [place] [address] any warranty or liability for your use of this information.

## 2021-09-07 NOTE — PROGRESS NOTES
Subjective:      Patient ID: Heidy Rubio is a 32 y.o. male who presents today with:  Chief Complaint   Patient presents with    Leg Pain     Here today for continued left leg pain. Unable to move the leg. Saw [facility] care  and was given Toradol and Flexeril. Went to [facility] ED  but waited for 8 hours and ended up leaving. Has not gotten any relief. Ibuprofen does help slightly but makes him nauseated. HPI    Has been able to move leg today  A little better. Is better today   No falls, no trauma. Past Medical History:   Diagnosis Date    Anxiety     Depression     Essential hypertension 10/10/2017    Jejunal intussusception ([address]     Nonintractable migraine 10/10/2017     No past surgical history on file. Social History     Socioeconomic History    Marital status:      Spouse name: Not on file    Number of children: Not on file    Years of education: Not on file    Highest education level: Not on file   Occupational History    Not on file   Tobacco Use    Smoking status: Former Smoker     Packs/day: 0.50     Years: 5.00     Pack years: 2.50     Quit date: 2019     Years since quittin.7    Smokeless tobacco: Never Used   Vaping Use    Vaping Use: Every day    Start date: 2019    Substances: Always   Substance and Sexual Activity    Alcohol use: No    Drug use: Yes     Types: Marijuana    Sexual activity: Not on file   Other Topics Concern    Not on file   Social History Narrative    Not on file     Social Determinants of Health     Financial Resource Strain: Low Risk     Difficulty of Paying Living Expenses: Not hard at all   Food Insecurity: No Food Insecurity    Worried About Running Out of Food in the Last Year: Never true    Jayce of Food in the Last Year: Never true   Transportation Needs: No Transportation Needs    Lack of Transportation (Medical): No    Lack of Transportation (Non-Medical):  No   Physical Activity:     Days of Exercise per Week:     Minutes of Exercise per Session:    Stress:     Feeling of Stress :    Social Connections:     Frequency of Communication with Friends and Family:     Frequency of Social Gatherings with Friends and Family:     Attends Ca[employer] Services:     Active Member of Clubs or Organizations:     Attends Club or Organization Meetings:     Marital Status:    Intimate Partner Violence:     Fear of Current or Ex-Partner:     Emotionally Abused:     Physically Abused:     Sexually Abused: Allergies   Allergen Reactions    Amoxil [Amoxicillin]      unknow to pt happens happens when he takes it    Buspar [Buspirone]      Groggy     Lexapro [Escitalopram Oxalate]      groggy    Zoloft [Sertraline Hcl]      Lost effectiveness      Current Outpatient Medications on File Prior to Visit   Medication Sig Dispense Refill    amLODIPine (NORVASC) 2.5 MG tablet TAKE 1 TABLET BY MOUTH EVERY DAY 90 tablet 0    citalopram (CELEXA) 10 MG tablet TAKE 1 TABLET BY MOUTH EVERY DAY 90 tablet 0    ammonium lactate (LAC-HYDRIN) 12 % lotion Apply  Thin layer topically to dry areas on feet (Patient not taking: Reported on 9/7/2021) 1 Bottle 0     No current facility-administered medications on file prior to visit. I have personally reviewed the ROS, PMH, PFH, and social history     Review of Systems   Constitutional: Negative for chills. HENT: Negative for congestion. Eyes: Negative for pain. Respiratory: Negative for shortness of breath. Cardiovascular: Negative for chest pain. Gastrointestinal: Negative for abdominal pain. Genitourinary: Negative for hematuria. Musculoskeletal: Negative for back pain. Leg pain    Allergic/Immunologic: Negative for immunocompromised state. Neurological: Negative for headaches. Psychiatric/Behavioral: Negative for hallucinations.        Objective:   /86 (Site: Left Upper Arm, Position: Sitting, Cuff Size: Medium Adult)   Pulse 83   Temp 98.7 °F (37.1 °C) (Oral)   Ht 5' 6\" (1.676 m)   Wt 240 lb (108.9 kg)   SpO2 98%   BMI 38.74 kg/m²     Physical Exam  Constitutional:       General: He is not in acute distress. Appearance: Normal appearance. He is not ill-appearing, toxic-appearing or diaphoretic. HENT:      Head: Normocephalic. Neck:      Vascular: No carotid bruit. Cardiovascular:      Rate and Rhythm: Normal rate and regular rhythm. Pulses: Normal pulses. Heart sounds: Normal heart sounds. No murmur heard. No friction rub. No gallop. Pulmonary:      Effort: Pulmonary effort is normal. No respiratory distress. Breath sounds: Normal breath sounds. No wheezing, rhonchi or rales. Abdominal:      General: Abdomen is flat. There is no distension. Palpations: Abdomen is soft. Tenderness: There is no abdominal tenderness. There is no right CVA tenderness, left CVA tenderness, guarding or rebound. Musculoskeletal:      Cervical back: Neck supple. Right lower leg: No edema. Left lower leg: No edema. Comments: No saddle anesthesia  5/5 muscle strength in bl hip ext/flexors, knee flex/extensors, and plantar and dorsiflexion  Negative Straight leg test  Sensation intact to soft touch and prick   No point tenderness over spinous processes. 2+ patellar and achilles reflexes bl   No palpable step off   Negative babinski  No clonus    Skin:     General: Skin is warm. Findings: No erythema or rash. Neurological:      Mental Status: He is alert. Psychiatric:         Mood and Affect: Mood normal.           Assessment:       Diagnosis Orders   1. Left leg pain  US Duplex Lower Extremity Left Haja    XR HIP LEFT (2-3 VIEWS)    XR FEMUR LEFT (MIN 2 VIEWS)    Ambulatory referral to Physical Therapy    XR LUMBAR SPINE (MIN 4 VIEWS)    Sedimentation Rate    C-Reactive Protein    lidocaine (LIDODERM) 5 %    tiZANidine (ZANAFLEX) 4 MG tablet    predniSONE (DELTASONE) 10 MG tablet   2.  Hyperglycemia  POCT anything should change or worsen call ASAP, don't wait for next scheduled appointment. Return in about 2 weeks (around 9/21/2021) for worsening symptoms, call ASAP for appointment, Chronic condition management/appointment.       Joe Luo MD

## 2021-09-21 ENCOUNTER — VIRTUAL VISIT (OUTPATIENT)
Dept: FAMILY MEDICINE CLINIC | Age: 31
End: 2021-09-21
Payer: COMMERCIAL

## 2021-09-21 ENCOUNTER — NURSE ONLY (OUTPATIENT)
Dept: FAMILY MEDICINE CLINIC | Age: 31
End: 2021-09-21

## 2021-09-21 DIAGNOSIS — B34.9 VIRAL ILLNESS: Primary | ICD-10-CM

## 2021-09-21 DIAGNOSIS — E78.5 HYPERLIPIDEMIA, UNSPECIFIED HYPERLIPIDEMIA TYPE: ICD-10-CM

## 2021-09-21 DIAGNOSIS — Z11.52 ENCOUNTER FOR SCREENING FOR COVID-19: Primary | ICD-10-CM

## 2021-09-21 DIAGNOSIS — M79.605 LEFT LEG PAIN: ICD-10-CM

## 2021-09-21 DIAGNOSIS — E55.9 VITAMIN D DEFICIENCY: ICD-10-CM

## 2021-09-21 DIAGNOSIS — R74.8 LOW SERUM ALKALINE PHOSPHATASE: ICD-10-CM

## 2021-09-21 DIAGNOSIS — Z80.0 FAMILY HISTORY OF COLON CANCER: ICD-10-CM

## 2021-09-21 DIAGNOSIS — R79.89 ELEVATED PLATELET COUNT: ICD-10-CM

## 2021-09-21 DIAGNOSIS — E78.6 LOW HDL (UNDER 40): ICD-10-CM

## 2021-09-21 LAB
INFLUENZA A ANTIBODY: NEGATIVE
INFLUENZA B ANTIBODY: NEGATIVE
Lab: NORMAL
PERFORMING INSTRUMENT: NORMAL
QC PASS/FAIL: NORMAL
SARS-COV-2, POC: NORMAL

## 2021-09-21 PROCEDURE — 87426 SARSCOV CORONAVIRUS AG IA: CPT | Performed by: INTERNAL MEDICINE

## 2021-09-21 PROCEDURE — 87804 INFLUENZA ASSAY W/OPTIC: CPT | Performed by: INTERNAL MEDICINE

## 2021-09-21 PROCEDURE — G8427 DOCREV CUR MEDS BY ELIG CLIN: HCPCS | Performed by: INTERNAL MEDICINE

## 2021-09-21 PROCEDURE — 99214 OFFICE O/P EST MOD 30 MIN: CPT | Performed by: INTERNAL MEDICINE

## 2021-09-21 RX ORDER — ERGOCALCIFEROL 1.25 MG/1
50000 CAPSULE ORAL WEEKLY
Qty: 6 CAPSULE | Refills: 0 | Status: SHIPPED | OUTPATIENT
Start: 2021-09-21

## 2021-09-21 ASSESSMENT — ENCOUNTER SYMPTOMS
SHORTNESS OF BREATH: 0
ABDOMINAL PAIN: 0
BACK PAIN: 0
EYE PAIN: 0

## 2021-09-21 NOTE — PROGRESS NOTES
Subjective:      Patient ID: Sunny Devi is a 32 y.o. male who presents today with:  No chief complaint on file. HPI   Mentions tested for covid yesterday at urgent care and was negative  Was not tested for flu  Was tested for strep wand covid and both were negative   Leg is one hundred percent better per patient. 3 days ago  Runny nose  Sore throat  achey  Able to swallow. Able to eat and drink. Past Medical History:   Diagnosis Date    Anxiety     Depression     Essential hypertension 10/10/2017    Jejunal intussusception ([facility] [address]     Nonintractable migraine 10/10/2017     No past surgical history on file. Social History     Socioeconomic History    Marital status:      Spouse name: Not on file    Number of children: Not on file    Years of education: Not on file    Highest education level: Not on file   Occupational History    Not on file   Tobacco Use    Smoking status: Former Smoker     Packs/day: 0.50     Years: 5.00     Pack years: 2.50     Quit date: 2019     Years since quittin.7    Smokeless tobacco: Never Used   Vaping Use    Vaping Use: Every day    Start date: 2019    Substances: Always   Substance and Sexual Activity    Alcohol use: No    Drug use: Yes     Types: Marijuana    Sexual activity: Not on file   Other Topics Concern    Not on file   Social History Narrative    Not on file     Social Determinants of Health     Financial Resource Strain: Low Risk     Difficulty of Paying Living Expenses: Not hard at all   Food Insecurity: No Food Insecurity    Worried About Running Out of Food in the Last Year: Never true    Jayce of Food in the Last Year: Never true   Transportation Needs: No Transportation Needs    Lack of Transportation (Medical): No    Lack of Transportation (Non-Medical):  No   Physical Activity:     Days of Exercise per Week:     Minutes of Exercise per Session:    Stress:     Feeling of Stress :    Social Connections:     Frequency of Communication with Friends and Family:     Frequency of Social Gatherings with Friends and Family:     Attends Congregation Services:     Active Member of Clubs or Organizations:     Attends Club or Organization Meetings:     Marital Status:    Intimate Partner Violence:     Fear of Current or Ex-Partner:     Emotionally Abused:     Physically Abused:     Sexually Abused: Allergies   Allergen Reactions    Amoxil [Amoxicillin]      unknow to pt happens happens when he takes it    Buspar [Buspirone]      Groggy     Lexapro [Escitalopram Oxalate]      groggy    Zoloft [Sertraline Hcl]      Lost effectiveness      Current Outpatient Medications on File Prior to Visit   Medication Sig Dispense Refill    amLODIPine (NORVASC) 2.5 MG tablet TAKE 1 TABLET BY MOUTH EVERY DAY 90 tablet 0    citalopram (CELEXA) 10 MG tablet TAKE 1 TABLET BY MOUTH EVERY DAY 90 tablet 0     No current facility-administered medications on file prior to visit. I have personally reviewed the ROS, PMH, PFH, and social history     Review of Systems   Constitutional: Negative for chills. HENT: Negative for congestion. Eyes: Negative for pain. Respiratory: Negative for shortness of breath. Cardiovascular: Negative for chest pain. Gastrointestinal: Negative for abdominal pain. Genitourinary: Negative for hematuria. Musculoskeletal: Negative for back pain. Allergic/Immunologic: Negative for immunocompromised state. Neurological: Negative for headaches. Psychiatric/Behavioral: Negative for hallucinations. Objective: There were no vitals taken for this visit. Physical Exam  Constitutional:       General: He is not in acute distress. Appearance: He is not ill-appearing, toxic-appearing or diaphoretic. Pulmonary:      Effort: Pulmonary effort is normal. No respiratory distress. Assessment:       Diagnosis Orders   1.  Viral illness  POCT COVID-19, Antigen    POCT Influenza A/B Misc. Devices (PULSE OXIMETER FOR FINGER) MISC    C-Reactive Protein    Sedimentation Rate   2. Left leg pain     3. Low HDL (under 40)  C-Reactive Protein    Sedimentation Rate   4. Hyperlipidemia, unspecified hyperlipidemia type  C-Reactive Protein    Sedimentation Rate   5. Low serum alkaline phosphatase  C-Reactive Protein    Sedimentation Rate    Zinc   6. Elevated platelet count  C-Reactive Protein    Sedimentation Rate    CBC Auto Differential   7. Family history of colon cancer  C-Reactive Protein    [address] Willie Levy MD, [facility]ology[facility]   8. Vitamin D deficiency  C-Reactive Protein    Sedimentation Rate    vitamin D (ERGOCALCIFEROL) 1.25 MG (78977 UT) CAPS capsule         Plan:     Video visit done because of coronavirus pandemic. Doxy. me used   explained billeable visit, patient understands and agrees to continue  I was at office and patient was at home during this visit. xrays and duplex from 9/7/2021 not done yet. PT Not started  Repeat inflammatory markers  Referred to derm 7/2021  High ldl, low hdl, life style changes forr now  [facility] 04/2022  See gi for colonoscopy per their discretion.    Pain gone.                        Orders Placed This Encounter   Procedures    C-Reactive Protein     Standing Status:   Future     Standing Expiration Date:   9/21/2022    Sedimentation Rate     Standing Status:   Future     Standing Expiration Date:   9/21/2022    Zinc     Standing Status:   Future     Standing Expiration Date:   9/21/2022    CBC Auto Differential     Standing Status:   Future     Standing Expiration Date:   9/21/2022   [facility] Herminia Bynum, Tessa Wright     Referral Priority:   Routine     Referral Type:   Eval and Treat     Referral Reason:   Specialty Services Required     Referred to Provider:   Floyd Mart MD     Requested Specialty:   Gastroenterology     Number of Visits Requested:   1    POCT COVID-19, Antigen Order Specific Question:   Is this test for diagnosis or screening? Answer:   Diagnosis of ill patient     Order Specific Question:   Symptomatic for COVID-19 as defined by CDC? Answer:   Yes     Order Specific Question:   Date of Symptom Onset     Answer:   9/17/2021     Order Specific Question:   Hospitalized for COVID-19? Answer:   No     Order Specific Question:   Admitted to ICU for COVID-19? Answer:   No     Order Specific Question:   Employed in healthcare setting? Answer:   No     Order Specific Question:   Resident in a congregate (group) care setting? Answer:   No     Order Specific Question:   Pregnant: Answer:   No     Order Specific Question:   Previously tested for COVID-19? Answer: Yes    POCT Influenza A/B     Orders Placed This Encounter   Medications    Misc. Devices (PULSE OXIMETER FOR FINGER) MISC     Sig: USE as directed     Dispense:  1 each     Refill:  0    vitamin D (ERGOCALCIFEROL) 1.25 MG (90734 UT) CAPS capsule     Sig: Take 1 capsule by mouth once a week     Dispense:  6 capsule     Refill:  0   please quarantine  High plt likely reactive repeat. Will recheck covid 19 and first time check for influenza  If any chest pain, arm or leg swelling, shortness of breath, or fevers which don't subside with tylenol please call asap. If pulse ox under 95. Check several times per day then call asap   If anything should change or worsen call ASAP, don't wait for next scheduled appointment. Return in about 6 weeks (around 11/2/2021) for Chronic condition management/appointment, worsening symptoms, call ASAP for appointment.       Kwesi Jenkins MD

## 2021-09-22 ENCOUNTER — TELEPHONE (OUTPATIENT)
Dept: FAMILY MEDICINE CLINIC | Age: 31
End: 2021-09-22

## 2021-10-20 ENCOUNTER — E-VISIT (OUTPATIENT)
Dept: FAMILY MEDICINE CLINIC | Age: 31
End: 2021-10-20
Payer: COMMERCIAL

## 2021-10-20 ENCOUNTER — TELEPHONE (OUTPATIENT)
Dept: WOMENS IMAGING | Age: 31
End: 2021-10-20

## 2021-10-20 DIAGNOSIS — R19.7 DIARRHEA, UNSPECIFIED TYPE: Primary | ICD-10-CM

## 2021-10-20 PROCEDURE — 99421 OL DIG E/M SVC 5-10 MIN: CPT | Performed by: INTERNAL MEDICINE

## 2021-10-20 RX ORDER — LOPERAMIDE HYDROCHLORIDE 2 MG/1
2 CAPSULE ORAL EVERY 6 HOURS PRN
Qty: 28 CAPSULE | Refills: 0 | Status: SHIPPED | OUTPATIENT
Start: 2021-10-20 | End: 2021-10-27

## 2021-10-20 NOTE — TELEPHONE ENCOUNTER
Patient states he does not have a fever and has not been vomiting anymore but he is still experiencing diarrhea. Offered walk in but states he started feeling this way after something he ate at work. Wants to know if you can just send something in to help stop the diarrhea? Please advise. We can not re route the evisit per chance, only can cancel if there is resolution.

## 2021-10-21 NOTE — TELEPHONE ENCOUNTER
E VISIT Completed yesterday  See message from yesterday  If he is not better he will need to be seen asap

## 2021-11-01 ENCOUNTER — OFFICE VISIT (OUTPATIENT)
Dept: GASTROENTEROLOGY | Age: 31
End: 2021-11-01
Payer: COMMERCIAL

## 2021-11-01 VITALS
RESPIRATION RATE: 12 BRPM | WEIGHT: 247 LBS | BODY MASS INDEX: 39.87 KG/M2 | HEART RATE: 89 BPM | OXYGEN SATURATION: 98 % | SYSTOLIC BLOOD PRESSURE: 128 MMHG | DIASTOLIC BLOOD PRESSURE: 78 MMHG

## 2021-11-01 DIAGNOSIS — R19.7 DIARRHEA, UNSPECIFIED TYPE: Primary | ICD-10-CM

## 2021-11-01 DIAGNOSIS — Z01.818 PRE-OP TESTING: ICD-10-CM

## 2021-11-01 DIAGNOSIS — K21.9 GASTROESOPHAGEAL REFLUX DISEASE WITHOUT ESOPHAGITIS: ICD-10-CM

## 2021-11-01 PROCEDURE — G8417 CALC BMI ABV UP PARAM F/U: HCPCS | Performed by: INTERNAL MEDICINE

## 2021-11-01 PROCEDURE — G8427 DOCREV CUR MEDS BY ELIG CLIN: HCPCS | Performed by: INTERNAL MEDICINE

## 2021-11-01 PROCEDURE — 1036F TOBACCO NON-USER: CPT | Performed by: INTERNAL MEDICINE

## 2021-11-01 PROCEDURE — 99204 OFFICE O/P NEW MOD 45 MIN: CPT | Performed by: INTERNAL MEDICINE

## 2021-11-01 PROCEDURE — G8484 FLU IMMUNIZE NO ADMIN: HCPCS | Performed by: INTERNAL MEDICINE

## 2021-11-01 RX ORDER — FAMOTIDINE 40 MG/1
40 TABLET, FILM COATED ORAL EVERY EVENING
Qty: 30 TABLET | Refills: 3 | Status: SHIPPED | OUTPATIENT
Start: 2021-11-01 | End: 2022-07-11 | Stop reason: CLARIF

## 2021-11-01 RX ORDER — POLYETHYLENE GLYCOL 3350, SODIUM CHLORIDE, SODIUM BICARBONATE, POTASSIUM CHLORIDE 420; 11.2; 5.72; 1.48 G/4L; G/4L; G/4L; G/4L
4000 POWDER, FOR SOLUTION ORAL ONCE
Qty: 4000 ML | Refills: 0 | Status: SHIPPED | OUTPATIENT
Start: 2021-11-01 | End: 2021-11-01

## 2021-11-01 ASSESSMENT — ENCOUNTER SYMPTOMS
VOICE CHANGE: 0
ABDOMINAL PAIN: 1
EYE PAIN: 0
NAUSEA: 0
PHOTOPHOBIA: 0
ABDOMINAL DISTENTION: 0
SHORTNESS OF BREATH: 0
CHEST TIGHTNESS: 0
RECTAL PAIN: 0
DIARRHEA: 1
WHEEZING: 0
COLOR CHANGE: 0
EYE REDNESS: 0
CONSTIPATION: 0
BLOOD IN STOOL: 0
TROUBLE SWALLOWING: 0
VOMITING: 0

## 2021-11-01 NOTE — PROGRESS NOTES
Subjective:      Patient ID: Soumya Fortune is a 32 y.o. male who presents today for:  Chief Complaint   Patient presents with    New Patient    Gastroesophageal Reflux       HPI  Patient came in to establish GI care with a constellation of GI symptoms including increased heartburn and diarrhea. Patient reports a longstanding history of increased GERD and report intolerance to PPIs owing to low heart rate. Reports nocturnal symptoms, identifies trigger foods as red sauce, has risk factors including nicotine use and excessive caffeine. Also reports longstanding history of intermittent diarrhea and fecal urgency with multiple second-degree relatives with colorectal cancer, no unintentional weight loss, abdominal pain, F/C, blood or mucous, melena, or hematochezia. At baseline is not on anticoagulants or antiplatelets, no family history of IBD, no extraintestinal manifestations of IBD. Past Medical History:   Diagnosis Date    Anxiety     Depression     Essential hypertension 10/10/2017    Jej[facility] intuss[facility]ception ([facility] [address]     Nonintractable migraine 10/10/2017     No past surgical history on file.   Social History     Socioeconomic History    Marital status:      Spouse name: Not on file    Number of children: Not on file    Years of education: Not on file    Highest education level: Not on file   Occupational History    Not on file   Tobacco Use    Smoking status: Former Smoker     Packs/day: 0.50     Years: 5.00     Pack years: 2.50     Quit date: 2019     Years since quittin.8    Smokeless tobacco: Never Used   Vaping Use    Vaping Use: Every day    Start date: 2019    Substances: Always   Substance and Sexual Activity    Alcohol use: No    Drug use: Yes     Types: Marijuana Victoria Mustard)    Sexual activity: Not on file   Other Topics Concern    Not on file   Social History Narrative    Not on file     Social Determinants of Health     Financial Resource Strain: Low Risk     Difficulty of Paying Living Expenses: Not hard at all   Food Insecurity: No Food Insecurity    Worried About Running Out of Food in the Last Year: Never true    Ran Out of Food in the Last Year: Never true   Transportation Needs: No Transportation Needs    Lack of Transportation (Medical): No    Lack of Transportation (Non-Medical): No   Physical Activity:     Days of Exercise per Week:     Minutes of Exercise per Session:    Stress:     Feeling of Stress :    Social Connections:     Frequency of Communication with Friends and Family:     Frequency of Social Gatherings with Friends and Family:     Attends Taoist Services:     Active Member of Clubs or Organizations:     Attends Club or Organization Meetings:     Marital Status:    Intimate Partner Violence:     Fear of Current or Ex-Partner:     Emotionally Abused:     Physically Abused:     Sexually Abused:      Family History   Problem Relation Age of Onset    High Blood Pressure Father     Mental Illness Father     Other Father         gout    Cancer Maternal Grandfather         Cancer from agent orange but he isn't sure what type    Cancer Paternal Grandmother         Breast cancer    Cancer Paternal Grandfather         LUNG SMOKER    Cancer Paternal Cousin         colon cancer    Cancer Paternal Cousin         colon cancer    Cancer Paternal Uncle         colon cancer    Cancer Paternal Great Grandparent         COLON CANCER     Allergies   Allergen Reactions    Amoxil [Amoxicillin]      unknow to pt happens happens when he takes it   [facility] [Buspirone]      Groggy     Lexapro [Escitalopram Oxalate]      groggy    Zoloft [Sertraline Hcl]      Lost effectiveness          Review of Systems   Constitutional: Negative for appetite change, chills, fatigue, fever and unexpected weight change. HENT: Negative for nosebleeds, tinnitus, trouble swallowing and voice change. Eyes: Negative for photophobia, pain and redness. Respiratory: Negative for chest tightness, shortness of breath and wheezing. Cardiovascular: Negative for chest pain, palpitations and leg swelling. Gastrointestinal: Positive for abdominal pain (heartburn) and diarrhea. Negative for abdominal distention, blood in stool, constipation, nausea, rectal pain and vomiting. Endocrine: Negative for polydipsia, polyphagia and polyuria. Genitourinary: Negative for difficulty urinating and hematuria. Skin: Negative for color change, pallor and rash. Neurological: Negative for dizziness, speech difficulty and headaches. Psychiatric/Behavioral: Negative for confusion and suicidal ideas. Objective:   /78 (Site: Right Upper Arm, Position: Sitting, Cuff Size: Large Adult)   Pulse 89   Resp 12   Wt 247 lb (112 kg)   SpO2 98%   BMI 39.87 kg/m²     Physical Exam  Constitutional:       General: He is not in acute distress. Appearance: He is well-developed. HENT:      Head: Normocephalic and atraumatic. Eyes:      Conjunctiva/sclera: Conjunctivae normal.      Pupils: Pupils are equal, round, and reactive to light. Cardiovascular:      Rate and Rhythm: Normal rate and regular rhythm. Heart sounds: Normal heart sounds. Pulmonary:      Effort: Pulmonary effort is normal. No respiratory distress. Breath sounds: Normal breath sounds. No wheezing or rales. Abdominal:      General: Bowel sounds are normal. There is no distension. Palpations: Abdomen is soft. Abdomen is not rigid. There is no hepatomegaly, splenomegaly or mass. Tenderness: There is no abdominal tenderness. There is no guarding or rebound. Musculoskeletal:         General: No tenderness or deformity. Normal range of motion. Cervical back: Neck supple. Skin:     Coloration: Skin is not pale. Findings: No erythema or rash. Neurological:      Mental Status: He is alert and oriented to person, place, and time.          Laboratory, Pathology, Radiology reviewed in detail with relevantimportant investigations summarized below:  Lab Results   Component Value Date    WBC 10.4 09/07/2021    WBC 7.9 10/23/2019    WBC 13.4 12/25/2018    WBC 10.7 10/28/2016    WBC 11.6 08/25/2016    HGB 13.9 09/07/2021    HGB 14.7 10/23/2019    HGB 14.6 12/25/2018    HGB 15.0 10/28/2016    HGB 14.5 08/25/2016    HCT 42.9 09/07/2021    HCT 44.5 10/23/2019    HCT 43.0 12/25/2018    HCT 43.7 10/28/2016    HCT 44.3 08/25/2016    MCV 79.6 09/07/2021    MCV 83.9 10/23/2019    MCV 82.1 12/25/2018    MCV 86.4 10/28/2016    MCV 87.1 08/25/2016     09/07/2021     10/23/2019     12/25/2018     10/28/2016     08/25/2016    . Lab Results   Component Value Date    ALT 21 09/07/2021    ALT 19 10/23/2019    ALT 11 08/25/2016    AST 18 09/07/2021    AST 17 10/23/2019    AST 12 08/25/2016    ALKPHOS 22 09/07/2021    ALKPHOS 20 10/23/2019    ALKPHOS 15 08/25/2016    BILITOT 0.3 09/07/2021    BILITOT 0.3 10/23/2019    BILITOT 0.3 08/25/2016       No results found. No results found for: IRON, TIBC, FERRITIN  Lab Results   Component Value Date    INR 1.1 08/17/2016    INR 1.0 08/14/2016     No components found for: ACUTEHEPATITISSCREEN  No components found for: CELIACPANEL  No components found for: STOOLCULTURE, C.DIFF, STOOLOVAPARASITE, STOOLLEUCOCYTE        Assessment:       Diagnosis Orders   1. Diarrhea, unspecified type  Colonoscopy    polyethylene glycol-electrolytes (NULYTELY) 420 g solution   2. Gastroesophageal reflux disease without esophagitis  EGD   3. Pre-op testing  Covid-19 Ambulatory               Orders Placed This Encounter   Procedures    Covid-19 Ambulatory     Standing Status:   Future     Standing Expiration Date:   11/1/2022     Scheduling Instructions:      Saline media preferred given current shortage of viral transport media but both acceptable     Order Specific Question:   Is this test for diagnosis or screening?      Answer: Screening     Order Specific Question:   Symptomatic for COVID-19 as defined by CDC? Answer:   No     Order Specific Question:   Date of Symptom Onset     Answer:   N/A     Order Specific Question:   Hospitalized for COVID-19? Answer:   No     Order Specific Question:   Admitted to ICU for COVID-19? Answer:   No     Order Specific Question:   Employed in healthcare setting? Answer:   Unknown     Order Specific Question:   Resident in a congregate (group) care setting? Answer:   Unknown     Order Specific Question:   Pregnant: Answer:   No     Order Specific Question:   Previously tested for COVID-19? Answer: Yes    EGD     Standing Status:   Future     Standing Expiration Date:   3/1/2022     Order Specific Question:   Screening or Diagnostic? Answer:   Screening     Orders Placed This Encounter   Medications    polyethylene glycol-electrolytes (NULYTELY) 420 g solution     Sig: Take 4,000 mLs by mouth once for 1 dose     Dispense:  4000 mL     Refill:  0     Plan:  1. Heartburn / GERD  Patient reports a longstanding history of increased GERD and report intolerance to PPIs owing to low heart rate. Reports nocturnal symptoms, identifies trigger foods as red sauce, has risk factors including nicotine use and excessive caffeine  = Reported poor intolerance to PPIs owing to low heart rate, initiate H2 blocker twice daily  = Pathophysiology and etiology of reflux discussed at length  = Lifestyle modification recommended and discussed with the patient   --Avoid spicy and acidic based foods   --Limit coffee, tea, alcohol use   --Limit the amount of food during meal time   --Avoid bedtime snacks and eat meals 3 to 4 hrs before lying down   --Elevate the head of the bed    --Keep healthy weight  EGD requested to assess for heartburn/GERD related complication. Assess the presence of hiatal hernia. The upper endoscopy procedure, complications, risk and benefit were reviewed.   Patient would like to proceed accordingly. 2.  Diarrhea, family history of CRC  longstanding history of intermittent diarrhea and fecal urgency with multiple second-degree relatives with colorectal cancer, no unintentional weight loss, abdominal pain, F/C, blood or mucous, melena, or hematochezia. At baseline is not on anticoagulants or antiplatelets, no family history of IBD, no extraintestinal manifestations of IBD. -Requested colonoscopy to assess for any mucosal or luminal etiologies, with TI intubation, indications and outcomes discussed at length, all of his questions were answered he agrees to proceed the next available appointment. Prep adherence discussed to ensure adequate exam.  -Further recommendations pending clinical course and colonoscopy results  3. Associated medical conditions include but are not limited to anxiety and depression, headaches, hypertension      Return in about 8 weeks (around 12/27/2021) for Post procedure results discussion, further management.        Renae Raines MD

## 2021-11-03 RX ORDER — AMLODIPINE BESYLATE 2.5 MG/1
TABLET ORAL
Qty: 90 TABLET | Refills: 3 | Status: SHIPPED | OUTPATIENT
Start: 2021-11-03 | End: 2022-07-11 | Stop reason: CLARIF

## 2021-11-03 RX ORDER — CITALOPRAM 10 MG/1
TABLET ORAL
Qty: 90 TABLET | Refills: 1 | Status: SHIPPED | OUTPATIENT
Start: 2021-11-03 | End: 2022-04-29

## 2021-11-26 ENCOUNTER — OFFICE VISIT (OUTPATIENT)
Dept: FAMILY MEDICINE CLINIC | Age: 31
End: 2021-11-26
Payer: COMMERCIAL

## 2021-11-26 DIAGNOSIS — R09.81 NASAL CONGESTION: ICD-10-CM

## 2021-11-26 DIAGNOSIS — J02.9 SORE THROAT: ICD-10-CM

## 2021-11-26 DIAGNOSIS — U07.1 COVID-19 VIRUS INFECTION: Primary | ICD-10-CM

## 2021-11-26 LAB
INFLUENZA A ANTIBODY: NORMAL
INFLUENZA B ANTIBODY: NORMAL
Lab: ABNORMAL
PERFORMING INSTRUMENT: ABNORMAL
QC PASS/FAIL: ABNORMAL
S PYO AG THROAT QL: NORMAL
SARS-COV-2, POC: DETECTED

## 2021-11-26 PROCEDURE — 87804 INFLUENZA ASSAY W/OPTIC: CPT | Performed by: PHYSICIAN ASSISTANT

## 2021-11-26 PROCEDURE — 99441 PR PHYS/QHP TELEPHONE EVALUATION 5-10 MIN: CPT | Performed by: PHYSICIAN ASSISTANT

## 2021-11-26 PROCEDURE — 87880 STREP A ASSAY W/OPTIC: CPT | Performed by: PHYSICIAN ASSISTANT

## 2021-11-26 PROCEDURE — 87426 SARSCOV CORONAVIRUS AG IA: CPT | Performed by: PHYSICIAN ASSISTANT

## 2021-11-26 RX ORDER — DEXTROMETHORPHAN HYDROBROMIDE AND PROMETHAZINE HYDROCHLORIDE 15; 6.25 MG/5ML; MG/5ML
5 SYRUP ORAL 4 TIMES DAILY PRN
Qty: 150 ML | Refills: 0 | Status: SHIPPED | OUTPATIENT
Start: 2021-11-26 | End: 2021-12-03

## 2021-11-26 ASSESSMENT — ENCOUNTER SYMPTOMS
VOMITING: 0
DIARRHEA: 0
COUGH: 1
ABDOMINAL PAIN: 0
BACK PAIN: 0
CHEST TIGHTNESS: 0
TROUBLE SWALLOWING: 0
SINUS PRESSURE: 0
SORE THROAT: 1
SHORTNESS OF BREATH: 0

## 2021-11-26 ASSESSMENT — PATIENT HEALTH QUESTIONNAIRE - PHQ9
SUM OF ALL RESPONSES TO PHQ9 QUESTIONS 1 & 2: 0
1. LITTLE INTEREST OR PLEASURE IN DOING THINGS: 0
2. FEELING DOWN, DEPRESSED OR HOPELESS: 0
SUM OF ALL RESPONSES TO PHQ QUESTIONS 1-9: 0

## 2021-11-26 NOTE — PROGRESS NOTES
TELEHEALTH EVALUATION -- Audio/Visual (During [place]-46 public health emergency)    -   Thaddeus Hare is a 32 y.o. male being evaluated by a Virtual Visit (video visit) encounter to address concerns as mentioned above. A caregiver was present when appropriate. Due to this being a TeleHealth encounter (During -84 public health emergency), evaluation of the following organ systems was limited: Vitals/Constitutional/EENT/Resp/CV/GI//MS/Neuro/Skin/Heme-Lymph-Imm. Pursuant to the emergency declaration under the [facility], [address] [place] [address] authority and the [employer] Act, this Virtual Visit was conducted with patient's (and/or legal guardian's) consent, to reduce the patient's risk of exposure to COVID-19 and provide necessary medical care. The patient (and/or legal guardian) has also been advised to contact this office for worsening conditions or problems, and seek emergency medical treatment and/or call 911 if deemed necessary. Patient was contacted and agreed to proceed with a virtual visit via Telephone Visit  The risks and benefits of converting to a virtual visit were discussed in light of the current infectious disease epidemic. Patient also understood that insurance coverage and co-pays are up to their individual insurance plans. Patient was located at their home. Provider was located at their office. 2021  Thaddeus Hare (:  1990) has requested an audio/video evaluation for the following concern(s):    HPI  32year old male who presents with nasal congestion, cough, sore throat, nausea, diarrhea and body aches for the past 4 days      Review of Systems   Constitutional: Positive for fatigue and fever. Negative for activity change, appetite change, chills and unexpected weight change. HENT: Positive for congestion and sore throat.  Negative for drooling, ear pain, nosebleeds, sinus pressure and trouble swallowing. Respiratory: Positive for cough. Negative for chest tightness and shortness of breath. Cardiovascular: Negative for chest pain and leg swelling. Gastrointestinal: Negative for abdominal pain, diarrhea and vomiting. Endocrine: Negative for polydipsia and polyphagia. Genitourinary: Negative for dysuria, flank pain and frequency. Musculoskeletal: Negative for back pain and myalgias. Skin: Negative for pallor and rash. Neurological: Negative for syncope, weakness and headaches. Hematological: Does not bruise/bleed easily. Psychiatric/Behavioral: Negative for agitation and behavioral problems. All other systems reviewed and are negative. Prior to Visit Medications    Medication Sig Taking? Authorizing Provider   promethazine-dextromethorphan (PROMETHAZINE-DM) 6.25-15 MG/5ML syrup Take 5 mLs by mouth 4 times daily as needed for Cough Yes VANE Neal   citalopram (CELEXA) 10 MG tablet TAKE 1 TABLET BY MOUTH EVERY DAY   Hernan Pederson MD   amLODIPine (NORVASC) 2.5 MG tablet TAKE 1 TABLET BY MOUTH EVERY DAY   Hernan Pederson MD   famotidine (PEPCID) 40 MG tablet Take 1 tablet by mouth every evening  APARNA Chiang - CNP   Misc. Devices (PULSE OXIMETER FOR FINGER) MISC USE as directed  Hernan Pederson MD   vitamin D (ERGOCALCIFEROL) 1.25 MG (71833 UT) CAPS capsule Take 1 capsule by mouth once a week  Hernan Pederson MD       Past Medical History:   Diagnosis Date    Anxiety     Depression     Essential hypertension 10/10/2017    [facility] intussusception ([facility] [address]     Nonintractable migraine 10/10/2017     No past surgical history on file.   Social History     Socioeconomic History    Marital status:      Spouse name: Not on file    Number of children: Not on file    Years of education: Not on file    Highest education level: Not on file   Occupational History    Not on file   Tobacco Use  Smoking status: Former Smoker     Packs/day: 0.50     Years: 5.00     Pack years: 2.50     Quit date: 2019     Years since quittin.9    Smokeless tobacco: Never Used   Vaping Use    Vaping Use: Every day    Start date: 2019    Substances: Always   Substance and Sexual Activity    Alcohol use: No    Drug use: Yes     Types: Marijuana Aguilar Venkat)    Sexual activity: Not on file   Other Topics Concern    Not on file   Social History Narrative    Not on file     Social Determinants of Health     Financial Resource Strain: Low Risk     Difficulty of Paying Living Expenses: Not hard at all   Food Insecurity: No Food Insecurity    Worried About [address] in the Last Year: Never true    [employer] [email or web address] in the Last Year: Never true   Transportation Needs: No Transportation Needs    Lack of Transportation (Medical): No    Lack of Transportation (Non-Medical):  No   Physical Activity:     Days of Exercise per Week: Not on file    Minutes of Exercise per Session: Not on file   Stress:     Feeling of Stress : Not on file   Social Connections:     Frequency of Communication with Friends and Family: Not on file    Frequency of Social Gatherings with Friends and Family: Not on file    Attends [employer] Services: Not on file    Active Member of [address] [place] [address] [employer] or Organizations: Not on file    Attends Club or Organization Meetings: Not on file    Marital Status: Not on file   Intimate Partner Violence:     Fear of Current or Ex-Partner: Not on file    Emotionally Abused: Not on file    Physically Abused: Not on file    Sexually Abused: Not on file   Housing Stability:     Unable to Pay for Housing in the Last Year: Not on file    Number of [place] in the Last Year: Not on file    Unstable Housing in the Last Year: Not on file     Family History   Problem Relation Age of Onset    High Blood Pressure Father     Mental Illness Father     Other Father         gout    Cancer Maternal Grandfather         Cancer from agent orange but he isn't sure what type    Cancer Paternal Grandmother         Breast cancer    Cancer Paternal Grandfather         LUNG SMOKER    Cancer Paternal Cousin         colon cancer    Cancer Paternal Cousin         colon cancer    Cancer Paternal Uncle         colon cancer    Cancer Paternal Great Grandparent         COLON CANCER     Allergies   Allergen Reactions    Amoxil [Amoxicillin]      unknow to pt happens happens when he takes it   [facility] [Buspirone]      Groggy     Lexapro [Escitalopram Oxalate]      groggy    Zoloft [Sertraline Hcl]      Lost effectiveness        PMH, Surgical Hx, Family Hx, and Social Hx reviewed and updated. Health Maintenance reviewed. PHYSICAL EXAMINATION:  \"[x]\" Indicates a positive item  \"[]\" Indicates a negative item    Vital Signs: (As obtained by patient/caregiver or practitioner observation)    Blood pressure-  Heart rate-    Respiratory rate-    Temperature-  Pulse oximetry-     Constitutional: [x] Appears well-developed and well-nourished [x] No apparent distress      [] Abnormal-   Mental status  [x] Alert and awake  [x] Oriented to person/place/time [x]Able to follow commands      Eyes:  EOM    []  Normal  [] Abnormal-  Sclera  [x]  Normal  [] Abnormal -         Discharge [x]  None visible  [] Abnormal -    HENT:   [x] Normocephalic, atraumatic.   [] Abnormal   [x] Mouth/Throat: Mucous membranes are moist.     External Ears [x] Normal  [] Abnormal-     Neck: [x] No visualized mass     Pulmonary/Chest: [x] Respiratory effort normal.  [x] No visualized signs of difficulty breathing or respiratory distress        [] Abnormal-      Musculoskeletal:   [x] Normal gait with no signs of ataxia         [x] Normal range of motion of neck        [] Abnormal-       Neurological:       [x] No Facial Asymmetry (Cranial nerve 7 motor function) (limited exam to video visit)          [x] No gaze palsy        [] Abnormal-         Skin: [x] No significant exanthematous lesions or discoloration noted on facial skin         [] Abnormal-            Psychiatric:       [x] Normal Affect [x] No Hallucinations        [] Abnormal-     Other pertinent observable physical exam findings-   Results for orders placed or performed in visit on 11/26/21   POCT Influenza A/B   Result Value Ref Range    Influenza A Ab neg     Influenza B Ab neg    POCT COVID-19, Antigen   Result Value Ref Range    SARS-COV-2, POC Detected (A) Not Detected    Lot Number [id number]     QC Pass/Fail p     Performing Instrument BD Veritor    POCT rapid strep A   Result Value Ref Range    Strep A Ag None Detected None Detected       ASSESSMENT/PLAN:  Assessment & Plan   Lilian Pittman was seen today for cough. Diagnoses and all orders for this visit:    COVID-19 virus infection  -     promethazine-dextromethorphan (PROMETHAZINE-DM) 6.25-15 MG/5ML syrup; Take 5 mLs by mouth 4 times daily as needed for Cough    Nasal congestion  -     POCT Influenza A/B  -     POCT COVID-19, Antigen  -     promethazine-dextromethorphan (PROMETHAZINE-DM) 6.25-15 MG/5ML syrup; Take 5 mLs by mouth 4 times daily as needed for Cough    Sore throat  -     Culture, Throat; Future  -     POCT rapid strep A  -     promethazine-dextromethorphan (PROMETHAZINE-DM) 6.25-15 MG/5ML syrup; Take 5 mLs by mouth 4 times daily as needed for Cough      Orders Placed This Encounter   Procedures    Culture, Throat     Standing Status:   Future     Standing Expiration Date:   11/26/2022    POCT Influenza A/B    POCT COVID-19, Antigen     Order Specific Question:   Is this test for diagnosis or screening? Answer:   Diagnosis of ill patient     Order Specific Question:   Symptomatic for COVID-19 as defined by CDC? Answer:   Yes     Order Specific Question:   Date of Symptom Onset     Answer:   11/23/2021     Order Specific Question:   Hospitalized for COVID-19?      Answer:   No     Order Specific Question:   Admitted to ICU for COVID-19? Answer:   No     Order Specific Question:   Employed in healthcare setting? Answer:   Unknown     Order Specific Question:   Resident in a congregate (group) care setting? Answer:   Unknown     Order Specific Question:   Pregnant: Answer:   No     Order Specific Question:   Previously tested for COVID-19? Answer: Yes    POCT rapid strep A     Orders Placed This Encounter   Medications    promethazine-dextromethorphan (PROMETHAZINE-DM) 6.25-15 MG/5ML syrup     Sig: Take 5 mLs by mouth 4 times daily as needed for Cough     Dispense:  150 mL     Refill:  0     There are no discontinued medications. Return if symptoms worsen or fail to improve. Reviewed with the patient: current clinical status, medications, activities and diet. Side effects, adverse effects of the medication prescribed today, as well as treatment plan/ rationale and result expectations have been discussed with the patient who expresses understanding and desires to proceed. Close follow up to evaluate treatment results and for coordination of care. I have reviewed the patient's medical history in detail and updated the computerized patient record. Patient identification was verified at the start of the visit: Yes    Total time spent on this encounter: Not billed by time      --VANE Quiles on 11/26/2021 at 1:28 PM    An electronic signature was used to authenticate this note.

## 2021-11-26 NOTE — PATIENT INSTRUCTIONS
Patient Education        Learning About Coronavirus [phone])  What is coronavirus (COVID-19)? COVID-19 is a disease caused by a type of coronavirus. This illness was first found in December 2019. It has since spread worldwide. Coronaviruses are a large group of viruses. They cause the common cold. They also cause more serious illnesses like Middle East respiratory syndrome (MERS) and severe acute respiratory syndrome (SARS). COVID-19 is caused by a novel coronavirus. That means it's a new type that has not been seen in people before. What are the symptoms? COVID-19 symptoms may include:  · Fever. · Cough. · Trouble breathing. · Chills or repeated shaking with chills. · Muscle and body aches. · Headache. · Sore throat. · New loss of taste or smell. · Vomiting. · Diarrhea. In severe cases, COVID-19 can cause pneumonia and make it hard to breathe without help from a machine. It can cause death. How is it diagnosed? COVID-19 is diagnosed with a viral test. This may also be called a PCR test or antigen test. It looks for evidence of the virus in your breathing passages or lungs (respiratory system). The test is most often done on a sample from the nose, throat, or lungs. It's sometimes done on a sample of saliva. One way a sample is collected is by putting a long swab into the back of your nose. How is it treated? Mild cases of COVID-19 can be treated at home. Serious cases need treatment in the hospital. Treatment may include medicines to reduce symptoms, plus breathing support such as oxygen therapy or a ventilator. Some people may be placed on their belly to help their oxygen levels. Treatments that may help people who have COVID-19 include:  Antiviral medicines. These medicines treat viral infections. Remdesivir is an example. Immune-based therapy. These medicines help the immune system fight COVID-19. Examples include monoclonal antibodies. Blood thinners.    These medicines help prevent blood clots. People with severe illness are at risk for blood clots. How can you protect yourself and others? The best way to protect yourself from getting sick is to:  · Get vaccinated. · Avoid sick people. · If you are not fully vaccinated:  ? Wear a mask if you have to go to public areas. ? Avoid crowds and try to stay at least 6 feet away from other people. · Cover your mouth with a tissue when you cough or sneeze. · Wash your hands often, especially after you cough or sneeze. Use soap and water, and scrub for at least 20 seconds. If soap and water aren't available, use an alcohol-based hand . · Avoid touching your mouth, nose, and eyes. To help avoid spreading the virus to others:  · Get vaccinated. · Cover your mouth with a tissue when you cough or sneeze. · Wash your hands often, especially after you cough or sneeze. Use soap and water, and scrub for at least 20 seconds. If soap and water aren't available, use an alcohol-based hand . · If you have been exposed to the virus and are not fully vaccinated:  ? Stay home. Don't go to school, work, or public areas. And don't use public transportation, ride-shares, or taxis unless you have no choice. ? Wear a mask if you have to go to public areas, like the pharmacy. · If you're sick:  ? Leave your home only if you need to get medical care. But call the doctor's office first so they know you're coming. And wear a mask. ? Wear a mask whenever you're around other people. ? Limit contact with pets and people in your home. If possible, stay in a separate bedroom and use a separate bathroom. ? Clean and disinfect your home every day. Use household  and disinfectant wipes or sprays. Take special care to clean things that you touch with your hands. How can you self-isolate when you have COVID-19? If you have COVID-19, there are things you can do to help avoid spreading the virus to others.   · Limit contact with people in your home. If possible, stay in a separate bedroom and use a separate bathroom. · Wear a mask when you are around other people. · If you have to leave home, avoid crowds and try to stay at least 6 feet away from other people. · Avoid contact with pets and other animals. · Cover your mouth and nose with a tissue when you cough or sneeze. Then throw it in the trash right away. · Wash your hands often, especially after you cough or sneeze. Use soap and water, and scrub for at least 20 seconds. If soap and water aren't available, use an alcohol-based hand . · Don't share personal household items. These include bedding, towels, cups and glasses, and eating utensils. · [address] in the warmest water allowed for the fabric type, and dry it completely. It's okay to wash other people's laundry with yours. · Clean and disinfect your home. Use household  and disinfectant wipes or sprays. When should you call for help? Call 911 anytime you think you may need emergency care. For example, call if you have life-threatening symptoms, such as:    · You have severe trouble breathing. (You can't talk at all.)     · You have constant chest pain or pressure.     · You are severely dizzy or lightheaded.     · You are confused or can't think clearly.     · You have pale, gray, or blue-colored skin or lips.     · You pass out (lose consciousness) or are very hard to wake up. Call your doctor now or seek immediate medical care if:    · You have moderate trouble breathing. (You can't speak a full sentence.)     · You are coughing up blood (more than about 1 teaspoon).     · You have signs of low blood pressure. These include feeling lightheaded; being too weak to stand; and having cold, pale, clammy skin.    Watch closely for changes in your health, and be sure to contact your doctor if:    · Your symptoms get worse.     · You are not getting better as expected.     · You have new or worse symptoms of anxiety,

## 2021-11-27 DIAGNOSIS — J02.9 SORE THROAT: ICD-10-CM

## 2021-11-29 LAB — THROAT CULTURE: NORMAL

## 2021-12-06 ENCOUNTER — OFFICE VISIT (OUTPATIENT)
Dept: FAMILY MEDICINE CLINIC | Age: 31
End: 2021-12-06
Payer: COMMERCIAL

## 2021-12-06 DIAGNOSIS — U07.1 COVID-19: Primary | ICD-10-CM

## 2021-12-06 DIAGNOSIS — J01.90 ACUTE NON-RECURRENT SINUSITIS, UNSPECIFIED LOCATION: ICD-10-CM

## 2021-12-06 DIAGNOSIS — R09.81 CONGESTION OF NASAL SINUS: ICD-10-CM

## 2021-12-06 PROCEDURE — 99213 OFFICE O/P EST LOW 20 MIN: CPT | Performed by: NURSE PRACTITIONER

## 2021-12-06 PROCEDURE — G8427 DOCREV CUR MEDS BY ELIG CLIN: HCPCS | Performed by: NURSE PRACTITIONER

## 2021-12-06 RX ORDER — CETIRIZINE HYDROCHLORIDE 10 MG/1
10 TABLET ORAL DAILY
Qty: 30 TABLET | Refills: 0 | Status: SHIPPED | OUTPATIENT
Start: 2021-12-06 | End: 2022-01-05

## 2021-12-06 RX ORDER — AZITHROMYCIN 250 MG/1
250 TABLET, FILM COATED ORAL SEE ADMIN INSTRUCTIONS
Qty: 6 TABLET | Refills: 0 | Status: SHIPPED | OUTPATIENT
Start: 2021-12-06 | End: 2022-03-16 | Stop reason: SDUPTHER

## 2021-12-06 RX ORDER — FLUTICASONE PROPIONATE 50 MCG
2 SPRAY, SUSPENSION (ML) NASAL DAILY
Qty: 16 G | Refills: 0 | Status: SHIPPED | OUTPATIENT
Start: 2021-12-06

## 2021-12-06 ASSESSMENT — ENCOUNTER SYMPTOMS
VOMITING: 0
NAUSEA: 0
WHEEZING: 0
SORE THROAT: 0
DIARRHEA: 0
RHINORRHEA: 0
SHORTNESS OF BREATH: 0
TROUBLE SWALLOWING: 0
COUGH: 1

## 2021-12-06 NOTE — PATIENT INSTRUCTIONS
nose.  · Put a hot, wet towel or a warm gel pack on your face 3 or 4 times a day for 5 to 10 minutes each time. · Try a decongestant nasal spray like oxymetazoline (Afrin). Do not use it for more than 3 days in a row. Using it for more than 3 days can make your congestion worse. When should you call for help? Call your doctor now or seek immediate medical care if:    · You have new or worse swelling or redness in your face or around your eyes.     · You have a new or higher fever. Watch closely for changes in your health, and be sure to contact your doctor if:    · You have new or worse facial pain.     · The mucus from your nose becomes thicker (like pus) or has new blood in it.     · You are not getting better as expected. Where can you learn more? Go to https://CloudTagspeImmusoft.Core Solutions. org and sign in to your [employer] account. Enter A763 in the [employer] box to learn more about \"Sinusitis: Care Instructions. \"     If you do not have an account, please click on the \"Sign Up Now\" link. Current as of: December 2, 2020               Content Version: 13.0  © 2191-4476 Healthwise, [employer]. Care instructions adapted under license by [facility] [address] If you have questions about a medical condition or this instruction, always ask your healthcare professional. [address] any warranty or liability for your use of this information.

## 2021-12-06 NOTE — LETTER
[facility]  [address] [address]  Phone: [phone]  Fax: [phone] [facility], APRN - CNP        December 6, 2021     Patient: Moises Pereira   YOB: 1990   Date of Visit: 12/6/2021       To Whom It May Concern: It is my medical opinion that Benito Knapp may return to work on 12/7. If you have any questions or concerns, please don't hesitate to call.     Sincerely,        Adrian Thomas, APARNA - CNP

## 2021-12-06 NOTE — PROGRESS NOTES
TELEHEALTH EVALUATION -- Audio/Visual (During XMGOE-63 public health emergency)    -   Elvin Haile is a 32 y.o. male being evaluated by a Virtual Visit (video visit) encounter to address concerns as mentioned above. A caregiver was present when appropriate. Due to this being a TeleHealth encounter (During XJBFN-27 public health emergency), evaluation of the following organ systems was limited: Vitals/Constitutional/EENT/Resp/CV/GI//MS/Neuro/Skin/Heme-Lymph-Imm. Pursuant to the emergency declaration under the [facility], [address] [place] [address] authority and the [employer] Act, this Virtual Visit was conducted with patient's (and/or legal guardian's) consent, to reduce the patient's risk of exposure to COVID-19 and provide necessary medical care. The patient (and/or legal guardian) has also been advised to contact this office for worsening conditions or problems, and seek emergency medical treatment and/or call 911 if deemed necessary. Patient was contacted and agreed to proceed with a virtual visit via Doxy. me  The risks and benefits of converting to a virtual visit were discussed in light of the current infectious disease epidemic. Patient also understood that insurance coverage and co-pays are up to their individual insurance plans. Patient was located at their home. Provider was located at their office. 2021  Elvin Haile (:  1990) has requested an audio/video evaluation for the following concern(s):    HPI        Tested positive for covid   Only thing left is nose severely stuffed   Coughing but because the post nasal drip   Fatigue   He has HTN   He has some SOB but could be related to anxiety   The quarantine was through       Review of Systems   Constitutional: Positive for fatigue. Negative for chills and fever.    HENT: Positive for congestion Former Smoker     Packs/day: 0.50     Years: 5.00     Pack years: 2.50     Quit date: 2019     Years since quittin.9    Smokeless tobacco: Never Used   Vaping Use    Vaping Use: Every day    Start date: 2019    Substances: Always   Substance and Sexual Activity    Alcohol use: No    Drug use: Yes     Types: Marijuana Malou Angela)    Sexual activity: Not on file   Other Topics Concern    Not on file   Social History Narrative    Not on file     Social Determinants of Health     Financial Resource Strain: Low Risk     Difficulty of Paying Living Expenses: Not hard at all   Food Insecurity: No Food Insecurity    Worried About [address] in the Last Year: Never true    [address] in the Last Year: Never true   Transportation Needs: No Transportation Needs    Lack of Transportation (Medical): No    Lack of Transportation (Non-Medical):  No   Physical Activity:     Days of Exercise per Week: Not on file    Minutes of Exercise per Session: Not on file   Stress:     Feeling of Stress : Not on file   Social Connections:     Frequency of Communication with Friends and Family: Not on file    Frequency of Social Gatherings with Friends and Family: Not on file    Attends Alevism Services: Not on file    Active Member of [address] [place] [address] [employer] or Organizations: Not on file    Attends Club or Organization Meetings: Not on file    Marital Status: Not on file   Intimate Partner Violence:     Fear of Current or Ex-Partner: Not on file    Emotionally Abused: Not on file    Physically Abused: Not on file    Sexually Abused: Not on file   Housing Stability:     Unable to Pay for Housing in the Last Year: Not on file    Number of [place] in the Last Year: Not on file    Unstable Housing in the Last Year: Not on file     Family History   Problem Relation Age of Onset    High Blood Pressure Father     Mental Illness Father     Other Father         gout    Cancer Maternal Grandfather Cancer from agent orange but he isn't sure what type    Cancer Paternal Grandmother         Breast cancer    Cancer Paternal Grandfather         LUNG SMOKER    Cancer Paternal Cousin         colon cancer    Cancer Paternal Cousin         colon cancer    Cancer Paternal Uncle         colon cancer    Cancer Paternal Great Grandparent         COLON CANCER     Allergies   Allergen Reactions    Amoxil [Amoxicillin]      unknow to pt happens happens when he takes it   [facility] [Buspirone]      Groggy     Lexapro [Escitalopram Oxalate]      groggy    Zoloft [Sertraline Hcl]      Lost effectiveness        PMH, Surgical Hx, Family Hx, and Social Hx reviewed and updated. Health Maintenance reviewed. PHYSICAL EXAMINATION:  \"[x]\" Indicates a positive item  \"[]\" Indicates a negative item    Vital Signs: (As obtained by patient/caregiver or practitioner observation)    Blood pressure-  Heart rate-    Respiratory rate-    Temperature-  Pulse oximetry-     Constitutional: [x] Appears well-developed and well-nourished [x] No apparent distress      [] Abnormal-   Mental status  [x] Alert and awake  [x] Oriented to person/place/time [x]Able to follow commands      Eyes:  EOM    []  Normal  [] Abnormal-  Sclera  [x]  Normal  [] Abnormal -         Discharge [x]  None visible  [] Abnormal -    HENT:   [x] Normocephalic, atraumatic.   [] Abnormal   [x] Mouth/Throat: Mucous membranes are moist.     External Ears [x] Normal  [] Abnormal-     Neck: [x] No visualized mass     Pulmonary/Chest: [x] Respiratory effort normal.  [x] No visualized signs of difficulty breathing or respiratory distress        [] Abnormal-      Musculoskeletal:   [] Normal gait with no signs of ataxia         [] Normal range of motion of neck        [] Abnormal-       Neurological:       [x] No Facial Asymmetry (Cranial nerve 7 motor function) (limited exam to video visit)          [x] No gaze palsy        [] Abnormal-         Skin:        [x] No significant exanthematous lesions or discoloration noted on facial skin         [] Abnormal-            Psychiatric:       [x] Normal Affect [x] No Hallucinations        [] Abnormal-     Other pertinent observable physical exam findings-   Results for orders placed or performed in visit on 21   Culture, Throat    Specimen: Throat   Result Value Ref Range    Throat Culture Usual respiratory brandan in 48 hours        ASSESSMENT/PLAN:          Assessment & Plan   Diagnoses and all orders for this visit:    COVID-19  -     azithromycin (ZITHROMAX) 250 MG tablet; Take 1 tablet by mouth See Admin Instructions for 5 days 500mg on day 1 followed by 250mg on days 2 - 5    Congestion of nasal sinus  -     cetirizine (ZYRTEC ALLERGY) 10 MG tablet; Take 1 tablet by mouth daily  -     fluticasone (FLONASE) 50 MCG/ACT nasal spray; 2 sprays by Each Nostril route daily    Acute non-recurrent sinusitis, unspecified location      No orders of the defined types were placed in this encounter. Orders Placed This Encounter   Medications    cetirizine (ZYRTEC ALLERGY) 10 MG tablet     Sig: Take 1 tablet by mouth daily     Dispense:  30 tablet     Refill:  0    azithromycin (ZITHROMAX) 250 MG tablet     Sig: Take 1 tablet by mouth See Admin Instructions for 5 days 500mg on day 1 followed by 250mg on days 2 - 5     Dispense:  6 tablet     Refill:  0    fluticasone (FLONASE) 50 MCG/ACT nasal spray     Si sprays by Each Nostril route daily     Dispense:  16 g     Refill:  0     There are no discontinued medications. No follow-ups on file. Reviewed with the patient: current clinical status, medications, activities and diet. Side effects, adverse effects of the medication prescribed today, as well as treatment plan/ rationale and result expectations have been discussed with the patient who expresses understanding and desires to proceed. Close follow up to evaluate treatment results and for coordination of care.   I have reviewed the patient's medical history in detail and updated the computerized patient record. Patient identification was verified at the start of the visit: Yes    Total time spent on this encounter: Not billed by time      --APARNA Fritz CNP on 12/6/2021 at 11:31 AM    An electronic signature was used to authenticate this note.

## 2021-12-26 PROBLEM — J02.9 SORE THROAT: Status: RESOLVED | Noted: 2021-11-26 | Resolved: 2021-12-26

## 2022-03-16 ENCOUNTER — OFFICE VISIT (OUTPATIENT)
Dept: FAMILY MEDICINE CLINIC | Age: 32
End: 2022-03-16
Payer: COMMERCIAL

## 2022-03-16 VITALS
SYSTOLIC BLOOD PRESSURE: 128 MMHG | HEIGHT: 66 IN | WEIGHT: 253 LBS | HEART RATE: 89 BPM | BODY MASS INDEX: 40.66 KG/M2 | DIASTOLIC BLOOD PRESSURE: 88 MMHG | RESPIRATION RATE: 17 BRPM | TEMPERATURE: 97.3 F

## 2022-03-16 DIAGNOSIS — R06.02 SOB (SHORTNESS OF BREATH): ICD-10-CM

## 2022-03-16 DIAGNOSIS — J06.9 URI WITH COUGH AND CONGESTION: Primary | ICD-10-CM

## 2022-03-16 LAB
INFLUENZA A ANTIBODY: NORMAL
INFLUENZA B ANTIBODY: NORMAL
Lab: NORMAL
PERFORMING INSTRUMENT: NORMAL
QC PASS/FAIL: NORMAL
SARS-COV-2, POC: NORMAL

## 2022-03-16 PROCEDURE — 99213 OFFICE O/P EST LOW 20 MIN: CPT | Performed by: NURSE PRACTITIONER

## 2022-03-16 PROCEDURE — G8427 DOCREV CUR MEDS BY ELIG CLIN: HCPCS | Performed by: NURSE PRACTITIONER

## 2022-03-16 PROCEDURE — G8484 FLU IMMUNIZE NO ADMIN: HCPCS | Performed by: NURSE PRACTITIONER

## 2022-03-16 PROCEDURE — 1036F TOBACCO NON-USER: CPT | Performed by: NURSE PRACTITIONER

## 2022-03-16 PROCEDURE — 87804 INFLUENZA ASSAY W/OPTIC: CPT | Performed by: NURSE PRACTITIONER

## 2022-03-16 PROCEDURE — G8417 CALC BMI ABV UP PARAM F/U: HCPCS | Performed by: NURSE PRACTITIONER

## 2022-03-16 PROCEDURE — 87426 SARSCOV CORONAVIRUS AG IA: CPT | Performed by: NURSE PRACTITIONER

## 2022-03-16 RX ORDER — METHYLPREDNISOLONE 4 MG/1
TABLET ORAL
Qty: 1 KIT | Refills: 0 | Status: SHIPPED | OUTPATIENT
Start: 2022-03-16 | End: 2022-03-22

## 2022-03-16 RX ORDER — AZITHROMYCIN 250 MG/1
250 TABLET, FILM COATED ORAL SEE ADMIN INSTRUCTIONS
Qty: 6 TABLET | Refills: 0 | Status: SHIPPED | OUTPATIENT
Start: 2022-03-16 | End: 2022-03-21

## 2022-03-16 RX ORDER — BENZONATATE 200 MG/1
200 CAPSULE ORAL 3 TIMES DAILY PRN
Qty: 30 CAPSULE | Refills: 0 | Status: SHIPPED | OUTPATIENT
Start: 2022-03-16 | End: 2022-03-26

## 2022-03-16 ASSESSMENT — ENCOUNTER SYMPTOMS
RHINORRHEA: 1
VOMITING: 1
NAUSEA: 1
SHORTNESS OF BREATH: 1
DIARRHEA: 1
COUGH: 1
SORE THROAT: 0
WHEEZING: 0

## 2022-03-16 NOTE — LETTER
[facility]  [address] [address]  Phone: [phone]  Fax: [phone] [facility] APARNA Bauer CNP        March 16, 2022     Patient: Navya Spicer   YOB: 1990   Date of Visit: 3/16/2022       To Whom It May Concern: It is my medical opinion that Hi Revel may return to work on 3/20. If you have any questions or concerns, please don't hesitate to call.     Sincerely,        APARNA Morales CNP

## 2022-03-16 NOTE — PATIENT INSTRUCTIONS
Patient Education        Upper Respiratory Infection (Cold): Care Instructions  Your Care Instructions     An upper respiratory infection, or URI, is an infection of the nose, sinuses, or throat. URIs are spread by coughs, sneezes, and direct contact. The common cold is the most frequent kind of URI. The flu and sinus infections are other kinds of URIs. Almost all URIs are caused by viruses. Antibiotics won't cure them. But you can treat most infections with home care. This may include drinking lots of fluids and taking over-the-counter pain medicine. You will probably feel better in 4 to 10 days. The doctor has checked you carefully, but problems can develop later. If you notice any problems or new symptoms, get medical treatment right away. Follow-up care is a key part of your treatment and safety. Be sure to make and go to all appointments, and call your doctor if you are having problems. It's also a good idea to know your test results and keep a list of the medicines you take. How can you care for yourself at home? · To prevent dehydration, drink plenty of fluids. Choose water and other clear liquids until you feel better. If you have kidney, heart, or liver disease and have to limit fluids, talk with your doctor before you increase the amount of fluids you drink. · Take an over-the-counter pain medicine, such as acetaminophen (Tylenol), ibuprofen (Advil, Motrin), or naproxen (Aleve). Read and follow all instructions on the label. · Before you use cough and cold medicines, check the label. These medicines may not be safe for young children or for people with certain health problems. · Be careful when taking over-the-counter cold or flu medicines and Tylenol at the same time. Many of these medicines have acetaminophen, which is Tylenol. Read the labels to make sure that you are not taking more than the recommended dose. Too much acetaminophen (Tylenol) can be harmful.   · Get plenty of rest.  · Do not smoke or allow others to smoke around you. If you need help quitting, talk to your doctor about stop-smoking programs and medicines. These can increase your chances of quitting for good. When should you call for help? Call 911 anytime you think you may need emergency care. For example, call if:    · You have severe trouble breathing. Call your doctor now or seek immediate medical care if:    · You seem to be getting much sicker.     · You have new or worse trouble breathing.     · You have a new or higher fever.     · You have a new rash. Watch closely for changes in your health, and be sure to contact your doctor if:    · You have a new symptom, such as a sore throat, an earache, or sinus pain.     · You cough more deeply or more often, especially if you notice more mucus or a change in the color of your mucus.     · You do not get better as expected. Where can you learn more? Go to https://[employer].iGuiders. org and sign in to your [employer] account. Enter A191 in the [employer] box to learn more about \"Upper Respiratory Infection (Cold): Care Instructions. \"     If you do not have an account, please click on the \"Sign Up Now\" link. Current as of: July 6, 2021               Content Version: 13.1  © 2006-2021 Healthwise, [employer]. Care instructions adapted under license by [facility] ([facility]). If you have questions about a medical condition or this instruction, always ask your healthcare professional. [place] [address] any warranty or liability for your use of this information. Patient Education        Cough: Care Instructions  Your Care Instructions     A cough is your body's response to something that bothers your throat or airways. Many things can cause a cough. You might cough because of a cold or the flu, bronchitis, or asthma. Smoking, postnasal drip, allergies, and stomach acid that backs up into your throat also can cause coughs.   A cough is a symptom, not a disease. Most coughs stop when the cause, such as a cold, goes away. You can take a few steps at home to cough less and feel better. Follow-up care is a key part of your treatment and safety. Be sure to make and go to all appointments, and call your doctor if you are having problems. It's also a good idea to know your test results and keep a list of the medicines you take. How can you care for yourself at home? · Drink lots of water and other fluids. This helps thin the mucus and soothes a dry or sore throat. Honey or lemon juice in hot water or tea may ease a dry cough. · Take cough medicine as directed by your doctor. · Prop up your head on pillows to help you breathe and ease a dry cough. · Try cough drops to soothe a dry or sore throat. Cough drops don't stop a cough. Medicine-flavored cough drops are no better than candy-flavored drops or hard candy. · Do not smoke. Avoid secondhand smoke. If you need help quitting, talk to your doctor about stop-smoking programs and medicines. These can increase your chances of quitting for good. When should you call for help? Call 911 anytime you think you may need emergency care. For example, call if:    · You have severe trouble breathing. Call your doctor now or seek immediate medical care if:    · You cough up blood.     · You have new or worse trouble breathing.     · You have a new or higher fever.     · You have a new rash. Watch closely for changes in your health, and be sure to contact your doctor if:    · You cough more deeply or more often, especially if you notice more mucus or a change in the color of your mucus.     · You have new symptoms, such as a sore throat, an earache, or sinus pain.     · You do not get better as expected. Where can you learn more? Go to https://Feedgenasa.Bitauto Holdings. org and sign in to your [employer] account. Enter D279 in the [employer] box to learn more about \"Cough: Care Instructions. \"     If

## 2022-03-16 NOTE — PROGRESS NOTES
Subjective:      Patient ID: Giovanni Akbar is a 32 y.o. male who presents today for:  Chief Complaint   Patient presents with    Cough     sx x 1 week       HPI      hes been sick for about 1 week   progressively getting worst the last 2 days   The cough is worse  The nose congestion is worse   The stomach   2 days ago coughed so much he threw up a couple times and couldn't stop coughing for almost 2 hours straight   A little nauseated here and there  Maybe 10-15 min then gone   He is eating   Only hungry at night time   He is drinking liquids and holding them down no prob  He has persistent SOB when he had covid recelenty and still havign that, doesn't feel worse though    squeezing headache for 3 days   Past Medical History:   Diagnosis Date    Anxiety     Depression     Essential hypertension 10/10/2017    Jejunal intussusception ([facility] [address]     Nonintractable migraine 10/10/2017     History reviewed. No pertinent surgical history.   Social History     Socioeconomic History    Marital status:      Spouse name: Not on file    Number of children: Not on file    Years of education: Not on file    Highest education level: Not on file   Occupational History    Not on file   Tobacco Use    Smoking status: Former Smoker     Packs/day: 0.50     Years: 5.00     Pack years: 2.50     Quit date: 2019     Years since quittin.2    Smokeless tobacco: Never Used   Vaping Use    Vaping Use: Every day    Start date: 2019    Substances: Always   Substance and Sexual Activity    Alcohol use: No    Drug use: Yes     Types: Marijuana Graciela Peals)    Sexual activity: Not on file   Other Topics Concern    Not on file   Social History Narrative    Not on file     Social Determinants of Health     Financial Resource Strain: Low Risk     Difficulty of Paying Living Expenses: Not hard at all   Food Insecurity: No Food Insecurity    Worried About [address] in the Last Year: Never true   [employer] of [employer] in the Last Year: Never true   Transportation Needs: No Transportation Needs    Lack of Transportation (Medical): No    Lack of Transportation (Non-Medical):  No   Physical Activity:     Days of Exercise per Week: Not on file    Minutes of Exercise per Session: Not on file   Stress:     Feeling of Stress : Not on file   Social Connections:     Frequency of Communication with Friends and Family: Not on file    Frequency of Social Gatherings with Friends and Family: Not on file    Attends Yazdanism Services: Not on file    Active Member of [address] [place] [address] [employer] or Organizations: Not on file    Attends Club or Organization Meetings: Not on file    Marital Status: Not on file   Intimate Partner Violence:     Fear of Current or Ex-Partner: Not on file    Emotionally Abused: Not on file    Physically Abused: Not on file    Sexually Abused: Not on file   Housing Stability:     Unable to Pay for Housing in the Last Year: Not on file    Number of [place] in the Last Year: Not on file    Unstable Housing in the Last Year: Not on file     Family History   Problem Relation Age of Onset    High Blood Pressure Father     Mental Illness Father     Other Father         gout    Cancer Maternal Grandfather         Cancer from agent orange but he isn't sure what type    Cancer Paternal Grandmother         Breast cancer    Cancer Paternal Grandfather         LUNG SMOKER    Cancer Paternal Cousin         colon cancer    Cancer Paternal Cousin         colon cancer    Cancer Paternal Uncle         colon cancer    Cancer Paternal Great Grandparent         COLON CANCER     Allergies   Allergen Reactions    Amoxil [Amoxicillin]      unknow to pt happens happens when he takes it   [facility] [Buspirone]      Groggy     Lexapro [Escitalopram Oxalate]      groggy    Zoloft [Sertraline Hcl]      Lost effectiveness      Current Outpatient Medications   Medication Sig Dispense Refill    benzonatate (TESSALON) 200 MG capsule Take 1 capsule by mouth 3 times daily as needed for Cough 30 capsule 0    azithromycin (ZITHROMAX) 250 MG tablet Take 1 tablet by mouth See Admin Instructions for 5 days 500mg on day 1 followed by 250mg on days 2 - 5 6 tablet 0    methylPREDNISolone (MEDROL DOSEPACK) 4 MG tablet Take by mouth. 1 kit 0    fluticasone (FLONASE) 50 MCG/ACT nasal spray 2 sprays by Each Nostril route daily 16 g 0    citalopram (CELEXA) 10 MG tablet TAKE 1 TABLET BY MOUTH EVERY DAY  90 tablet 1    amLODIPine (NORVASC) 2.5 MG tablet TAKE 1 TABLET BY MOUTH EVERY DAY  90 tablet 3    famotidine (PEPCID) 40 MG tablet Take 1 tablet by mouth every evening 30 tablet 3    Misc. Devices (PULSE OXIMETER FOR FINGER) MISC USE as directed 1 each 0    vitamin D (ERGOCALCIFEROL) 1.25 MG (52565 UT) CAPS capsule Take 1 capsule by mouth once a week 6 capsule 0     No current facility-administered medications for this visit. Review of Systems   Constitutional: Positive for appetite change, chills, diaphoresis and fatigue. Negative for fever. HENT: Positive for congestion and rhinorrhea. Negative for sore throat and trouble swallowing. Respiratory: Positive for cough and shortness of breath. Negative for wheezing. Gastrointestinal: Positive for diarrhea, nausea and vomiting (with coughing). Musculoskeletal: Positive for myalgias. Skin: Negative for rash. Neurological: Positive for headaches. Negative for dizziness and light-headedness. Psychiatric/Behavioral: Negative for agitation, confusion and hallucinations. Objective:   /88   Pulse 89   Temp 97.3 °F (36.3 °C) (Tympanic)   Resp 17   Ht 5' 6\" (1.676 m)   Wt 253 lb (114.8 kg)   BMI 40.84 kg/m²     Physical Exam  Vitals reviewed. Constitutional:       Appearance: Normal appearance. HENT:      Head: Normocephalic and atraumatic. Right Ear: Hearing, tympanic membrane, ear canal and external ear normal. No middle ear effusion. No foreign body. Capillary refill takes less than 2 seconds. Neurological:      General: No focal deficit present. Mental Status: He is alert and oriented to person, place, and time. Cranial Nerves: Cranial nerves are intact. Gait: Gait is intact. Psychiatric:         Mood and Affect: Mood normal.         Speech: Speech normal.         Behavior: Behavior normal. Behavior is cooperative. Thought Content: Thought content normal.         Judgment: Judgment normal.         Assessment:       Diagnosis Orders   1. URI with cough and congestion  POCT COVID-19, Antigen    POCT Influenza A/B    benzonatate (TESSALON) 200 MG capsule    azithromycin (ZITHROMAX) 250 MG tablet   2. SOB (shortness of breath)  methylPREDNISolone (MEDROL DOSEPACK) 4 MG tablet     Results for POC orders placed in visit on 03/16/22   POCT Influenza A/B   Result Value Ref Range    Influenza A Ab neg     Influenza B Ab neg       Plan:     Assessment & Plan   Sudheer Lundberg was seen today for cough. Diagnoses and all orders for this visit:    URI with cough and congestion  -     POCT COVID-19, Antigen  -     POCT Influenza A/B  -     benzonatate (TESSALON) 200 MG capsule; Take 1 capsule by mouth 3 times daily as needed for Cough  -     azithromycin (ZITHROMAX) 250 MG tablet; Take 1 tablet by mouth See Admin Instructions for 5 days 500mg on day 1 followed by 250mg on days 2 - 5    SOB (shortness of breath)  -     methylPREDNISolone (MEDROL DOSEPACK) 4 MG tablet; Take by mouth. Orders Placed This Encounter   Procedures    POCT COVID-19, Antigen     Order Specific Question:   Is this test for diagnosis or screening? Answer:   Diagnosis of ill patient     Order Specific Question:   Symptomatic for COVID-19 as defined by CDC? Answer:   Yes     Order Specific Question:   Date of Symptom Onset     Answer:   3/9/2022     Order Specific Question:   Hospitalized for COVID-19?      Answer:   No     Order Specific Question:   Admitted to ICU for COVID-19? Answer:   No     Order Specific Question:   Employed in healthcare setting? Answer:   No     Order Specific Question:   Resident in a congregate (group) care setting? Answer:   No     Order Specific Question:   Pregnant: Answer:   No     Order Specific Question:   Previously tested for COVID-19? Answer: Yes    POCT Influenza A/B     Orders Placed This Encounter   Medications    benzonatate (TESSALON) 200 MG capsule     Sig: Take 1 capsule by mouth 3 times daily as needed for Cough     Dispense:  30 capsule     Refill:  0    azithromycin (ZITHROMAX) 250 MG tablet     Sig: Take 1 tablet by mouth See Admin Instructions for 5 days 500mg on day 1 followed by 250mg on days 2 - 5     Dispense:  6 tablet     Refill:  0    methylPREDNISolone (MEDROL DOSEPACK) 4 MG tablet     Sig: Take by mouth. Dispense:  1 kit     Refill:  0     Medications Discontinued During This Encounter   Medication Reason    azithromycin (ZITHROMAX) 250 MG tablet REORDER     Return if symptoms worsen or fail to improve. Reviewed with the patient/family: current clinical status & medications. Side effects of the medication prescribed today, as well as treatment plan/rationale and result expectations have been discussed with the patient/family who expresses understanding. Patient will be discharged home in stable condition. Follow up with PCP to evaluate treatment results or return if symptoms worsen or fail to improve. Discussed signs and symptoms which require immediate follow-up in ED/call to 911. Understanding verbalized. I have reviewed the patient's medical history in detail and updated the computerized patient record.     Luisa Urbina, APRN - CNP

## 2022-03-21 ASSESSMENT — ENCOUNTER SYMPTOMS: TROUBLE SWALLOWING: 0

## 2022-04-29 RX ORDER — CITALOPRAM 10 MG/1
TABLET ORAL
Qty: 30 TABLET | Refills: 0 | Status: SHIPPED | OUTPATIENT
Start: 2022-04-29 | End: 2022-07-11 | Stop reason: CLARIF

## 2022-06-03 ENCOUNTER — OFFICE VISIT (OUTPATIENT)
Dept: FAMILY MEDICINE CLINIC | Age: 32
End: 2022-06-03
Payer: COMMERCIAL

## 2022-06-03 VITALS
DIASTOLIC BLOOD PRESSURE: 78 MMHG | TEMPERATURE: 97.7 F | WEIGHT: 252 LBS | BODY MASS INDEX: 40.5 KG/M2 | SYSTOLIC BLOOD PRESSURE: 128 MMHG | HEIGHT: 66 IN | HEART RATE: 73 BPM | OXYGEN SATURATION: 98 %

## 2022-06-03 DIAGNOSIS — L55.1 SUNBURN OF SECOND DEGREE: Primary | ICD-10-CM

## 2022-06-03 PROCEDURE — 99213 OFFICE O/P EST LOW 20 MIN: CPT | Performed by: NURSE PRACTITIONER

## 2022-06-03 PROCEDURE — G8417 CALC BMI ABV UP PARAM F/U: HCPCS | Performed by: NURSE PRACTITIONER

## 2022-06-03 PROCEDURE — 1036F TOBACCO NON-USER: CPT | Performed by: NURSE PRACTITIONER

## 2022-06-03 PROCEDURE — G8427 DOCREV CUR MEDS BY ELIG CLIN: HCPCS | Performed by: NURSE PRACTITIONER

## 2022-06-03 ASSESSMENT — PATIENT HEALTH QUESTIONNAIRE - PHQ9
9. THOUGHTS THAT YOU WOULD BE BETTER OFF DEAD, OR OF HURTING YOURSELF: 0
SUM OF ALL RESPONSES TO PHQ QUESTIONS 1-9: 0
8. MOVING OR SPEAKING SO SLOWLY THAT OTHER PEOPLE COULD HAVE NOTICED. OR THE OPPOSITE, BEING SO FIGETY OR RESTLESS THAT YOU HAVE BEEN MOVING AROUND A LOT MORE THAN USUAL: 0
5. POOR APPETITE OR OVEREATING: 0
4. FEELING TIRED OR HAVING LITTLE ENERGY: 0
SUM OF ALL RESPONSES TO PHQ9 QUESTIONS 1 & 2: 0
SUM OF ALL RESPONSES TO PHQ QUESTIONS 1-9: 0
1. LITTLE INTEREST OR PLEASURE IN DOING THINGS: 0
6. FEELING BAD ABOUT YOURSELF - OR THAT YOU ARE A FAILURE OR HAVE LET YOURSELF OR YOUR FAMILY DOWN: 0
7. TROUBLE CONCENTRATING ON THINGS, SUCH AS READING THE NEWSPAPER OR WATCHING TELEVISION: 0
SUM OF ALL RESPONSES TO PHQ QUESTIONS 1-9: 0
SUM OF ALL RESPONSES TO PHQ QUESTIONS 1-9: 0
3. TROUBLE FALLING OR STAYING ASLEEP: 0
2. FEELING DOWN, DEPRESSED OR HOPELESS: 0
10. IF YOU CHECKED OFF ANY PROBLEMS, HOW DIFFICULT HAVE THESE PROBLEMS MADE IT FOR YOU TO DO YOUR WORK, TAKE CARE OF THINGS AT HOME, OR GET ALONG WITH OTHER PEOPLE: 0

## 2022-06-03 ASSESSMENT — ENCOUNTER SYMPTOMS
TROUBLE SWALLOWING: 0
NAUSEA: 0
SORE THROAT: 0
VOMITING: 0
COLOR CHANGE: 1
RHINORRHEA: 0
COUGH: 0
WHEEZING: 0
DIARRHEA: 0
SHORTNESS OF BREATH: 0

## 2022-06-03 NOTE — LETTER
[facility]  [address] [place] [address] [address] [address]  Phone: [phone]  Fax: [phone] [address] [address] APARNA Bauer CNP        Charley 3, 2022     Patient: Jose Cason   YOB: 1990   Date of Visit: 6/3/2022       To Whom It May Concern:     Emilyblas Thomasry was seen in the office today. If you have any questions or concerns, please don't hesitate to call.     Sincerely,        APARNA Nunn CNP

## 2022-06-03 NOTE — PATIENT INSTRUCTIONS
Patient Education        Sunburn: Care Instructions  Overview  A sunburn is skin damage from the sun's ultraviolet (UV) rays. Most sunburns cause mild pain and redness but affect only the outer layer of skin. These are called first-degree burns. The red skin might hurt when you touch it. Thesesunburns are mild and can usually be treated at home. Skin that is red and painful and that swells up and blisters may mean that deep skin layers and nerve endings have been damaged. These are second-degree burns. This type of sunburn is usually more painful and takes longer to heal.  Follow-up care is a key part of your treatment and safety. Be sure to make and go to all appointments, and call your doctor if you are having problems. It's also a good idea to know your test results and keep alist of the medicines you take. How can you care for yourself at home?  Use cool cloths on the sunburned areas.  Apply soothing lotions with aloe vera to sunburned areas.  Try anti-inflammatory medicine (like ibuprofen) to reduce pain, swelling, and fever. Read and follow all instructions on the label.  Don't try to stop peeling after a sunburn. It's part of the healing process.  Protect your skin by using sunscreen, hats, and loose-fitting, tightly-woven clothes. Caring for blisters  Blisters often heal on their own.  Don't try to break blisters. Leave them alone.  Don't remove the flap of skin covering the blister unless it tears or gets dirty or pus forms under it. The flap protects the healing skin underneath.  If a blister ruptures, gently clean it with mild soap and water and loosely cover it. Put a thin layer of petroleum jelly on the bandage before you put the bandage on. This will keep it from sticking to the blister. When should you call for help? Call your doctor now or seek immediate medical care if:     You have signs of needing more fluids.  You have sunken eyes, a dry mouth, and you pass only a little urine.      You have signs of infection, such as:  ? Increased pain, swelling, warmth, or redness. ? Red streaks leading from the area. ? Pus draining from the area. ? A fever. Watch closely for changes in your health, and be sure to contact your doctor if:     You do not get better as expected. Where can you learn more? Go to [email or web address]://[email or web address] org and sign in to your [employer] account. Enter H146 in the SecureLink box to learn more about \"Sunburn: Care Instructions. \"     If you do not have an account, please click on the \"Sign Up Now\" link. Current as of: July 1, 2021               Content Version: 13.2  © 2006-2022 [employer]. Care instructions adapted under license by [employer] ([facility]). If you have questions about a medical condition or this instruction, always ask your healthcare professional. [address] any warranty or liability for your use of this information. Patient Education         Blister Care (01:44)  Your health professional recommends that you watch this short online healthvideo. Learn how to care for your blister so it will heal and won't get infected. Purpose:  Outlines basic care of a blister to promote healing and prevent infection. Goal:  User will learn basic care for blisters, including signs of infection and howto avoid it. How to watch the video    Scan the QR code   OR Visit the website    https://hwi. se/r/Ijyfmsxo8eill   Current as of: November 15, 2021               Content Version: 13.2  © 2006-2022 [employer]. Care instructions adapted under license by [employer] ([facility]). If you have questions about a medical condition or this instruction, always ask your healthcare professional. [address] any warranty or liability for your use of this information.

## 2022-06-03 NOTE — PROGRESS NOTES
Subjective:      Patient ID: Heidy Rubio is a 32 y.o. male who presents today for:  Chief Complaint   Patient presents with    Sunburn     Patient states has had sunburn for 3 days. HPI      He has sunburn for three days now   Its red  It hurts  He has a couple open blister that keep dripping      Past Medical History:   Diagnosis Date    Anxiety     Depression     Essential hypertension 10/10/2017    Jejunal intussusception ([address]     Nonintractable migraine 10/10/2017     History reviewed. No pertinent surgical history. Social History     Socioeconomic History    Marital status:      Spouse name: Not on file    Number of children: Not on file    Years of education: Not on file    Highest education level: Not on file   Occupational History    Not on file   Tobacco Use    Smoking status: Former Smoker     Packs/day: 0.50     Years: 5.00     Pack years: 2.50     Quit date: 2019     Years since quittin.4    Smokeless tobacco: Never Used   Vaping Use    Vaping Use: Every day    Start date: 2019    Substances: Always   Substance and Sexual Activity    Alcohol use: No    Drug use: Yes     Types: Marijuana [place])    Sexual activity: Not on file   Other Topics Concern    Not on file   Social History Narrative    Not on file     Social Determinants of Health     Financial Resource Strain: Low Risk     Difficulty of Paying Living Expenses: Not hard at all   Food Insecurity: No Food Insecurity    Worried About [address] in the Last Year: Never true    [address]  in the Last Year: Never true   Transportation Needs: No Transportation Needs    Lack of Transportation (Medical): No    Lack of Transportation (Non-Medical):  No   Physical Activity:     Days of Exercise per Week: Not on file    Minutes of Exercise per Session: Not on file   Stress:     Feeling of Stress : Not on file   Social Connections:     Frequency of Communication with Friends and Family: Not on file    Frequency of Social Gatherings with Friends and Family: Not on file    Attends Anglican Services: Not on file    Active Member of Clubs or Organizations: Not on file    Attends Club or Organization Meetings: Not on file    Marital Status: Not on file   Intimate Partner Violence:     Fear of Current or Ex-Partner: Not on file    Emotionally Abused: Not on file    Physically Abused: Not on file    Sexually Abused: Not on file   Housing Stability:     Unable to Pay for Housing in the Last Year: Not on file    Number of [place] in the Last Year: Not on file    Unstable Housing in the Last Year: Not on file     Family History   Problem Relation Age of Onset    High Blood Pressure Father     Mental Illness Father     Other Father         gout    Cancer Maternal Grandfather         Cancer from agent orange but he isn't sure what type    Cancer Paternal Grandmother         Breast cancer    Cancer Paternal Grandfather         LUNG SMOKER    Cancer Paternal Cousin         colon cancer    Cancer Paternal Cousin         colon cancer    Cancer Paternal Uncle         colon cancer    Cancer Paternal Great Grandparent         COLON CANCER     Allergies   Allergen Reactions    Amoxil [Amoxicillin]      unknow to pt happens happens when he takes it   [facility] [Buspirone]      Groggy     Lexapro [Escitalopram Oxalate]      groggy    Zoloft [Sertraline Hcl]      Lost effectiveness      Current Outpatient Medications   Medication Sig Dispense Refill    citalopram (CELEXA) 10 MG tablet TAKE 1 TABLET BY MOUTH EVERY DAY 30 tablet 0    fluticasone (FLONASE) 50 MCG/ACT nasal spray 2 sprays by Each Nostril route daily 16 g 0    amLODIPine (NORVASC) 2.5 MG tablet TAKE 1 TABLET BY MOUTH EVERY DAY  90 tablet 3    famotidine (PEPCID) 40 MG tablet Take 1 tablet by mouth every evening 30 tablet 3    Misc.  Devices (PULSE OXIMETER FOR FINGER) MISC USE as directed 1 each 0    vitamin D (ERGOCALCIFEROL) 1.25 MG (81019 UT) CAPS capsule Take 1 capsule by mouth once a week 6 capsule 0     No current facility-administered medications for this visit. Review of Systems   Constitutional: Negative for chills, fatigue and fever. HENT: Negative for congestion, rhinorrhea, sore throat and trouble swallowing. Respiratory: Negative for cough, shortness of breath and wheezing. Gastrointestinal: Negative for diarrhea, nausea and vomiting. Musculoskeletal: Negative for myalgias. Skin: Positive for color change. Negative for rash. Neurological: Negative for dizziness, light-headedness and headaches. Objective:   /78   Pulse 73   Temp 97.7 °F (36.5 °C) (Infrared)   Ht 5' 6\" (1.676 m)   Wt 252 lb (114.3 kg)   SpO2 98%   BMI 40.67 kg/m²     Physical Exam  Vitals reviewed. Constitutional:       Appearance: Normal appearance. HENT:      Head: Normocephalic and atraumatic. Nose: Nose normal.      Mouth/Throat:      Lips: Pink. Mouth: Mucous membranes are moist.   Eyes:      General: Lids are normal.      Conjunctiva/sclera: Conjunctivae normal.   Cardiovascular:      Rate and Rhythm: Normal rate. Pulmonary:      Effort: Pulmonary effort is normal.   Abdominal:      Tenderness: There is no guarding. Musculoskeletal:      Cervical back: Normal range of motion. Skin:     General: Skin is warm and dry. Capillary Refill: Capillary refill takes less than 2 seconds. Findings: Burn, erythema and rash present. Rash is not vesicular. Comments: Sun burn to both shoulders. The left has a open blister a little bigger then a quarter. Weeping serous fluid   Neurological:      General: No focal deficit present. Mental Status: He is alert and oriented to person, place, and time. Psychiatric:         Mood and Affect: Mood normal.         Behavior: Behavior normal. Behavior is cooperative. Assessment:       Diagnosis Orders   1.  Sunburn of second degree       No results found for this visit on 06/03/22. Plan:   Gave pt Xeroform gauze to put on the area that has open blister, otherwise encouraged Aloe with Lidocain     Assessment & Plan   Lopez Check was seen today for sunburn. Diagnoses and all orders for this visit:    Sunburn of second degree      No orders of the defined types were placed in this encounter. No orders of the defined types were placed in this encounter. There are no discontinued medications. Return if symptoms worsen or fail to improve. Reviewed with the patient/family: current clinical status & medications. Side effects of the medication prescribed today, as well as treatment plan/rationale and result expectations have been discussed with the patient/family who expresses understanding. Patient will be discharged home in stable condition. Follow up with PCP to evaluate treatment results or return if symptoms worsen or fail to improve. Discussed signs and symptoms which require immediate follow-up in ED/call to 911. Understanding verbalized. I have reviewed the patient's medical history in detail and updated the computerized patient record.     Estrellita Godoy, APRN - CNP

## 2022-06-14 RX ORDER — CITALOPRAM 10 MG/1
TABLET ORAL
Qty: 30 TABLET | Refills: 2 | OUTPATIENT
Start: 2022-06-14

## 2022-06-14 NOTE — TELEPHONE ENCOUNTER
Please call him  Mail letter if no response  Will send emergency supply if I know he is still seeing me

## 2022-06-24 ENCOUNTER — OFFICE VISIT (OUTPATIENT)
Dept: FAMILY MEDICINE CLINIC | Age: 32
End: 2022-06-24
Payer: COMMERCIAL

## 2022-06-24 VITALS
OXYGEN SATURATION: 98 % | TEMPERATURE: 97.4 F | HEIGHT: 66 IN | HEART RATE: 78 BPM | DIASTOLIC BLOOD PRESSURE: 84 MMHG | WEIGHT: 280 LBS | BODY MASS INDEX: 45 KG/M2 | SYSTOLIC BLOOD PRESSURE: 118 MMHG

## 2022-06-24 DIAGNOSIS — R19.7 DIARRHEA OF PRESUMED INFECTIOUS ORIGIN: Primary | ICD-10-CM

## 2022-06-24 PROCEDURE — G8417 CALC BMI ABV UP PARAM F/U: HCPCS | Performed by: PHYSICIAN ASSISTANT

## 2022-06-24 PROCEDURE — 99213 OFFICE O/P EST LOW 20 MIN: CPT | Performed by: PHYSICIAN ASSISTANT

## 2022-06-24 PROCEDURE — 1036F TOBACCO NON-USER: CPT | Performed by: PHYSICIAN ASSISTANT

## 2022-06-24 PROCEDURE — G8427 DOCREV CUR MEDS BY ELIG CLIN: HCPCS | Performed by: PHYSICIAN ASSISTANT

## 2022-06-24 RX ORDER — CIPROFLOXACIN 500 MG/1
500 TABLET, FILM COATED ORAL 2 TIMES DAILY
Qty: 10 TABLET | Refills: 0 | Status: SHIPPED | OUTPATIENT
Start: 2022-06-24 | End: 2022-06-29

## 2022-06-24 ASSESSMENT — ENCOUNTER SYMPTOMS
DIARRHEA: 1
SHORTNESS OF BREATH: 0
FLATUS: 0
NAUSEA: 0
SORE THROAT: 0
BLOATING: 1
BACK PAIN: 0
CHEST TIGHTNESS: 0
VOMITING: 1
SINUS PRESSURE: 0
ABDOMINAL PAIN: 1
SINUS PAIN: 0
COUGH: 0

## 2022-06-24 ASSESSMENT — VISUAL ACUITY: OU: 1

## 2022-06-24 NOTE — LETTER
[facility]  [address] [facility] [address]  Phone: [phone]  Fax: [phone]    Grace Eisenberg        June 24, 2022     Patient: Marcelo Angeles   YOB: 1990   Date of Visit: 6/24/2022       To Whom it May Concern:    Carly Baldwin was seen in my clinic on 6/24/2022. He may return to work on 06/27/22. If you have any questions or concerns, please don't hesitate to call.     Sincerely,         VANE Eisenberg

## 2022-06-24 NOTE — PROGRESS NOTES
[address] Encounter  CHIEF COMPLAINT       Chief Complaint   Patient presents with    Abdominal Pain     Patient is here c/o abdominal pain. Pt states he has spells of diarrhea, body aches, sweats, and abdominal pain. Pt states pain occurs at top of abdomen, describes pain as pulsating. Pt states issues have been ongoing for about 3 days. HISTORY OF PRESENT ILLNESS   Georgette Eric is a 28 y.o. male who presents with:  Diarrhea   This is a new problem. The problem occurs 5 to 10 times per day. The problem has been unchanged. The stool consistency is described as watery. The patient states that diarrhea awakens him from sleep. Associated symptoms include abdominal pain, bloating, myalgias and vomiting. Pertinent negatives include no arthralgias, chills, coughing, fever, headaches, increased  flatus, sweats, URI or weight loss. Nothing aggravates the symptoms. There are no known risk factors. Treatments tried: tylenol. There is no history of bowel resection, inflammatory bowel disease, irritable bowel syndrome, malabsorption, a recent abdominal surgery or short gut syndrome. REVIEW OF SYSTEMS     Review of Systems   Constitutional: Negative for activity change, appetite change, chills, fever and weight loss. HENT: Negative for congestion, drooling, sinus pressure, sinus pain and sore throat. Eyes: Negative for visual disturbance. Respiratory: Negative for cough, chest tightness and shortness of breath. Cardiovascular: Negative for chest pain. Gastrointestinal: Positive for abdominal pain, bloating, diarrhea and vomiting. Negative for flatus and nausea. Endocrine: Negative for cold intolerance. Genitourinary: Negative for dysuria, flank pain, frequency and hematuria. Musculoskeletal: Positive for myalgias. Negative for arthralgias and back pain. Skin: Negative for rash. Allergic/Immunologic: Negative for food allergies. Neurological: Negative for weakness, light-headedness, numbness and headaches. Hematological: Does not bruise/bleed easily. PAST MEDICAL HISTORY         Diagnosis Date    Anxiety     Depression     Essential hypertension 10/10/2017    Jejunal intussusception ([facility] [address]     Nonintractable migraine 10/10/2017     SURGICAL HISTORY     Patient  has no past surgical history on file. CURRENT MEDICATIONS       Previous Medications    AMLODIPINE (NORVASC) 2.5 MG TABLET    TAKE 1 TABLET BY MOUTH EVERY DAY     CITALOPRAM (CELEXA) 10 MG TABLET    TAKE 1 TABLET BY MOUTH EVERY DAY    FAMOTIDINE (PEPCID) 40 MG TABLET    Take 1 tablet by mouth every evening    FLUTICASONE (FLONASE) 50 MCG/ACT NASAL SPRAY    2 sprays by Each Nostril route daily    MISC. DEVICES (PULSE OXIMETER FOR FINGER) MISC    USE as directed    VITAMIN D (ERGOCALCIFEROL) 1.25 MG (30630 UT) CAPS CAPSULE    Take 1 capsule by mouth once a week     ALLERGIES     Patient is is allergic to amoxil [amoxicillin], buspar [buspirone], lexapro [escitalopram oxalate], and zoloft [sertraline hcl]. FAMILY HISTORY     Patient'sfamily history includes Cancer in his maternal grandfather, paternal cousin, paternal cousin, paternal grandfather, paternal grandmother, paternal great grandparent, and paternal uncle; High Blood Pressure in his father; Mental Illness in his father; Other in his father. SOCIAL HISTORY     Patient  reports that he quit smoking about 2 years ago. He has a 2.50 pack-year smoking history. He has never used smokeless tobacco. He reports current drug use. Drug: Marijuana Veldon Breath). He reports that he does not drink alcohol. PHYSICAL EXAM     VITALS  BP: 118/84, Temp: 97.4 °F (36.3 °C), Heart Rate: 78,  , SpO2: 98 %  Physical Exam  Vitals and nursing note reviewed. Constitutional:       General: He is awake. He is not in acute distress. Appearance: Normal appearance. He is well-developed. He is not ill-appearing, toxic-appearing or diaphoretic. HENT:      Head: Normocephalic and atraumatic. Right Ear: Hearing and external ear normal.      Left Ear: Hearing and external ear normal.      Nose: Nose normal.      Mouth/Throat:      Lips: Pink. Mouth: Mucous membranes are moist.   Eyes:      General: Lids are normal. Vision grossly intact. Gaze aligned appropriately. Conjunctiva/sclera: Conjunctivae normal.   Cardiovascular:      Rate and Rhythm: Normal rate and regular rhythm. Pulses: Normal pulses. Heart sounds: Normal heart sounds, S1 normal and S2 normal.   Pulmonary:      Effort: Pulmonary effort is normal.      Breath sounds: Normal breath sounds and air entry. Abdominal:      General: Abdomen is protuberant. Tenderness: There is abdominal tenderness in the right lower quadrant and left lower quadrant. There is no right CVA tenderness or left CVA tenderness. Hernia: No hernia is present. Musculoskeletal:      Cervical back: Normal range of motion. Skin:     General: Skin is warm. Capillary Refill: Capillary refill takes less than 2 seconds. Neurological:      Mental Status: He is alert and oriented to person, place, and time. Gait: Gait is intact. Psychiatric:         Attention and Perception: Attention normal.         Mood and Affect: Mood normal.         Speech: Speech normal.         Behavior: Behavior normal. Behavior is cooperative. READY CARE COURSE   Labs:  No results found for this visit on 06/24/22. IMAGING:  No orders to display     Scheduled Meds:  Continuous Infusions:  PRN Meds:. PROCEDURES:  FINAL IMPRESSION      1. Diarrhea of presumed infectious origin      DISPOSITION/PLAN   1. Presuming infectious diarrhea. Will start abx. Went over possible s/e of the medications. Patient would like to proceed with therapy. Recommend BRAT diet and oral hydration. No signs of dehydration at this time, recommend f/u with PCP if symptoms do not improve after abx.  Went over possible s/e of the medications. Patient would like to proceed with therapy. Discussed signs and symptoms which require immediate follow-up in ED/call to 911. Patient verbalized understanding. On this date 6/24/2022 I have spent 20 minutes reviewing previous notes, test results and face to face with the patient discussing the diagnosis and importance of compliance with the treatment plan as well as documenting on the day of the visit. PATIENT REFERRED TO:  Return if symptoms worsen or fail to improve. DISCHARGE MEDICATIONS:  New Prescriptions    CIPROFLOXACIN (CIPRO) 500 MG TABLET    Take 1 tablet by mouth 2 times daily for 5 days     Cannot display discharge medications since this is not an admission.        Lenora Davidson [place]

## 2022-07-11 ENCOUNTER — TELEPHONE (OUTPATIENT)
Dept: FAMILY MEDICINE CLINIC | Age: 32
End: 2022-07-11

## 2022-07-11 ENCOUNTER — OFFICE VISIT (OUTPATIENT)
Dept: FAMILY MEDICINE CLINIC | Age: 32
End: 2022-07-11
Payer: COMMERCIAL

## 2022-07-11 VITALS
OXYGEN SATURATION: 98 % | DIASTOLIC BLOOD PRESSURE: 105 MMHG | TEMPERATURE: 98.3 F | BODY MASS INDEX: 40.51 KG/M2 | SYSTOLIC BLOOD PRESSURE: 148 MMHG | WEIGHT: 251 LBS | HEART RATE: 97 BPM

## 2022-07-11 DIAGNOSIS — R42 DIZZINESS: ICD-10-CM

## 2022-07-11 DIAGNOSIS — R10.9 INTERMITTENT ABDOMINAL PAIN: ICD-10-CM

## 2022-07-11 DIAGNOSIS — R19.7 DIARRHEA, UNSPECIFIED TYPE: ICD-10-CM

## 2022-07-11 DIAGNOSIS — R06.09 EXERTIONAL DYSPNEA: ICD-10-CM

## 2022-07-11 DIAGNOSIS — Z12.11 SCREENING FOR COLON CANCER: ICD-10-CM

## 2022-07-11 DIAGNOSIS — R19.7 DIARRHEA, UNSPECIFIED TYPE: Primary | ICD-10-CM

## 2022-07-11 DIAGNOSIS — F32.A DEPRESSION, UNSPECIFIED DEPRESSION TYPE: ICD-10-CM

## 2022-07-11 LAB
ALBUMIN SERPL-MCNC: 4.3 G/DL (ref 3.5–4.6)
ALP BLD-CCNC: 24 U/L (ref 35–104)
ALT SERPL-CCNC: 33 U/L (ref 0–41)
ANION GAP SERPL CALCULATED.3IONS-SCNC: 16 MEQ/L (ref 9–15)
AST SERPL-CCNC: 30 U/L (ref 0–40)
BASOPHILS ABSOLUTE: 0 K/UL (ref 0–0.2)
BASOPHILS RELATIVE PERCENT: 0.1 %
BILIRUB SERPL-MCNC: <0.2 MG/DL (ref 0.2–0.7)
BUN BLDV-MCNC: 13 MG/DL (ref 6–20)
C-REACTIVE PROTEIN: 27.2 MG/L (ref 0–5)
CALCIUM SERPL-MCNC: 9.7 MG/DL (ref 8.5–9.9)
CHLORIDE BLD-SCNC: 101 MEQ/L (ref 95–107)
CHOLESTEROL, TOTAL: 173 MG/DL (ref 0–199)
CO2: 22 MEQ/L (ref 20–31)
CREAT SERPL-MCNC: 0.85 MG/DL (ref 0.7–1.2)
EOSINOPHILS ABSOLUTE: 0.1 K/UL (ref 0–0.7)
EOSINOPHILS RELATIVE PERCENT: 1.1 %
GFR AFRICAN AMERICAN: >60
GFR NON-AFRICAN AMERICAN: >60
GLOBULIN: 3 G/DL (ref 2.3–3.5)
GLUCOSE BLD-MCNC: 80 MG/DL (ref 70–99)
HCT VFR BLD CALC: 41.7 % (ref 42–52)
HDLC SERPL-MCNC: 30 MG/DL (ref 40–59)
HEMOGLOBIN: 13.6 G/DL (ref 14–18)
LDL CHOLESTEROL CALCULATED: 120 MG/DL (ref 0–129)
LIPASE: 25 U/L (ref 12–95)
LYMPHOCYTES ABSOLUTE: 1.8 K/UL (ref 1–4.8)
LYMPHOCYTES RELATIVE PERCENT: 21.9 %
MCH RBC QN AUTO: 26.1 PG (ref 27–31.3)
MCHC RBC AUTO-ENTMCNC: 32.5 % (ref 33–37)
MCV RBC AUTO: 80.2 FL (ref 80–100)
MONOCYTES ABSOLUTE: 0.5 K/UL (ref 0.2–0.8)
MONOCYTES RELATIVE PERCENT: 6.2 %
NEUTROPHILS ABSOLUTE: 5.9 K/UL (ref 1.4–6.5)
NEUTROPHILS RELATIVE PERCENT: 70.7 %
PDW BLD-RTO: 15.6 % (ref 11.5–14.5)
PLATELET # BLD: 382 K/UL (ref 130–400)
POTASSIUM SERPL-SCNC: 4.2 MEQ/L (ref 3.4–4.9)
RBC # BLD: 5.2 M/UL (ref 4.7–6.1)
SEDIMENTATION RATE, ERYTHROCYTE: 25 MM (ref 0–10)
SODIUM BLD-SCNC: 139 MEQ/L (ref 135–144)
TOTAL PROTEIN: 7.3 G/DL (ref 6.3–8)
TRIGL SERPL-MCNC: 116 MG/DL (ref 0–150)
WBC # BLD: 8.4 K/UL (ref 4.8–10.8)

## 2022-07-11 PROCEDURE — 93000 ELECTROCARDIOGRAM COMPLETE: CPT | Performed by: INTERNAL MEDICINE

## 2022-07-11 PROCEDURE — 99214 OFFICE O/P EST MOD 30 MIN: CPT | Performed by: INTERNAL MEDICINE

## 2022-07-11 PROCEDURE — G8417 CALC BMI ABV UP PARAM F/U: HCPCS | Performed by: INTERNAL MEDICINE

## 2022-07-11 PROCEDURE — 1036F TOBACCO NON-USER: CPT | Performed by: INTERNAL MEDICINE

## 2022-07-11 PROCEDURE — G8427 DOCREV CUR MEDS BY ELIG CLIN: HCPCS | Performed by: INTERNAL MEDICINE

## 2022-07-11 RX ORDER — AMLODIPINE BESYLATE 2.5 MG/1
2.5 TABLET ORAL DAILY
COMMUNITY

## 2022-07-11 RX ORDER — FAMOTIDINE 20 MG/1
20 TABLET, FILM COATED ORAL 2 TIMES DAILY
Qty: 60 TABLET | Refills: 2 | Status: SHIPPED | OUTPATIENT
Start: 2022-07-11

## 2022-07-11 RX ORDER — ALBUTEROL SULFATE 90 UG/1
1 AEROSOL, METERED RESPIRATORY (INHALATION) EVERY 6 HOURS PRN
Qty: 18 G | Refills: 5 | Status: SHIPPED | OUTPATIENT
Start: 2022-07-11

## 2022-07-11 RX ORDER — AMLODIPINE BESYLATE 10 MG/1
10 TABLET ORAL DAILY
Qty: 30 TABLET | Refills: 0 | Status: SHIPPED | OUTPATIENT
Start: 2022-07-11 | End: 2022-07-11

## 2022-07-11 RX ORDER — CITALOPRAM 20 MG/1
20 TABLET ORAL DAILY
Qty: 30 TABLET | Refills: 1 | Status: SHIPPED | OUTPATIENT
Start: 2022-07-11 | End: 2022-09-15

## 2022-07-11 ASSESSMENT — ENCOUNTER SYMPTOMS
BACK PAIN: 0
SHORTNESS OF BREATH: 0
ABDOMINAL PAIN: 0
EYE PAIN: 0

## 2022-07-11 ASSESSMENT — PATIENT HEALTH QUESTIONNAIRE - PHQ9
SUM OF ALL RESPONSES TO PHQ QUESTIONS 1-9: 0
SUM OF ALL RESPONSES TO PHQ QUESTIONS 1-9: 0
1. LITTLE INTEREST OR PLEASURE IN DOING THINGS: 0
SUM OF ALL RESPONSES TO PHQ QUESTIONS 1-9: 0
SUM OF ALL RESPONSES TO PHQ9 QUESTIONS 1 & 2: 0
2. FEELING DOWN, DEPRESSED OR HOPELESS: 0
SUM OF ALL RESPONSES TO PHQ QUESTIONS 1-9: 0

## 2022-07-11 NOTE — PROGRESS NOTES
Subjective:      Patient ID: Jeanne Licea is a 28 y.o. male who presents today with:  Chief Complaint   Patient presents with    Follow-up     pt is here today for pat f/u visit       HPI      Here for follow up  Did not fu with gi  Diarrhea came back   Dizzy  Daily  Random  Not worse with head movement  No passing out  Multiple nonspecific symptoms   Past Medical History:   Diagnosis Date    Anxiety     Depression     Essential hypertension 10/10/2017    Jejunal intussusception ([address]     Nonintractable migraine 10/10/2017     No past surgical history on file. Social History     Socioeconomic History    Marital status:      Spouse name: Not on file    Number of children: Not on file    Years of education: Not on file    Highest education level: Not on file   Occupational History    Not on file   Tobacco Use    Smoking status: Former Smoker     Packs/day: 0.50     Years: 5.00     Pack years: 2.50     Quit date: 2019     Years since quittin.5    Smokeless tobacco: Never Used   Vaping Use    Vaping Use: Every day    Start date: 2019    Substances: Always   Substance and Sexual Activity    Alcohol use: No    Drug use: Yes     Types: Marijuana Nubia Robledo)    Sexual activity: Not on file   Other Topics Concern    Not on file   Social History Narrative    Not on file     Social Determinants of Health     Financial Resource Strain: Low Risk     Difficulty of Paying Living Expenses: Not hard at all   Food Insecurity: No Food Insecurity    Worried About [address] in the Last Year: Never true    [facility] in the Last Year: Never true   Transportation Needs: No Transportation Needs    Lack of Transportation (Medical): No    Lack of Transportation (Non-Medical):  No   Physical Activity:     Days of Exercise per Week: Not on file    Minutes of Exercise per Session: Not on file   Stress:     Feeling of Stress : Not on file   Social Connections:     Frequency of Communication with Friends and Family: Not on file    Frequency of Social Gatherings with Friends and Family: Not on file    Attends Taoist Services: Not on file    Active Member of Clubs or Organizations: Not on file    Attends Club or Organization Meetings: Not on file    Marital Status: Not on file   Intimate Partner Violence:     Fear of Current or Ex-Partner: Not on file    Emotionally Abused: Not on file    Physically Abused: Not on file    Sexually Abused: Not on file   Housing Stability:     Unable to Pay for Housing in the Last Year: Not on file    Number of [place] in the Last Year: Not on file    Unstable Housing in the Last Year: Not on file     Allergies   Allergen Reactions    Amoxil [Amoxicillin]      unknow to pt happens happens when he takes it   [facility] [Buspirone]      Groggy     Lexapro [Escitalopram Oxalate]      groggy    Zoloft [Sertraline Hcl]      Lost effectiveness      Current Outpatient Medications on File Prior to Visit   Medication Sig Dispense Refill    fluticasone (FLONASE) 50 MCG/ACT nasal spray 2 sprays by Each Nostril route daily 16 g 0    amLODIPine (NORVASC) 2.5 MG tablet TAKE 1 TABLET BY MOUTH EVERY DAY  90 tablet 3    Misc. Devices (PULSE OXIMETER FOR FINGER) MISC USE as directed 1 each 0    vitamin D (ERGOCALCIFEROL) 1.25 MG (53038 UT) CAPS capsule Take 1 capsule by mouth once a week 6 capsule 0     No current facility-administered medications on file prior to visit. I have personally reviewed the ROS, PMH, PFH, and social history     Review of Systems   Constitutional: Negative for chills. HENT: Negative for congestion. Eyes: Negative for pain. Respiratory: Negative for shortness of breath. Cardiovascular: Negative for chest pain. Gastrointestinal: Negative for abdominal pain. Genitourinary: Negative for hematuria. Musculoskeletal: Negative for back pain. Allergic/Immunologic: Negative for immunocompromised state. No     Order Specific Question:   Reason for exam:     Answer:   diarrhea, and history of intussecption    XR CHEST (2 VW)     Standing Status:   Future     Standing Expiration Date:   7/11/2023    Allergen, Food, Comprehensive Profile 1     Standing Status:   Future     Standing Expiration Date:   7/11/2023    Comprehensive Metabolic Panel     Standing Status:   Future     Standing Expiration Date:   7/11/2023    CBC with Auto Differential     Standing Status:   Future     Standing Expiration Date:   7/11/2023    Lipid Panel     Standing Status:   Future     Standing Expiration Date:   7/11/2023     Order Specific Question:   Is Patient Fasting?/# of Hours     Answer:   10    Lipase     Standing Status:   Future     Standing Expiration Date:   7/11/2023    Sedimentation Rate     Standing Status:   Future     Standing Expiration Date:   7/11/2023    C-Reactive Protein     Standing Status:   Future     Standing Expiration Date:   7/11/2023    Zinc     Standing Status:   Future     Standing Expiration Date:   7/11/2023   [facility], [place]     Referral Priority:   Routine     Referral Type:   Eval and Treat     Referral Reason:   Specialty Services Required     Referred to Provider:   Rosmery Beckett MD     Requested Specialty:   Gastroenterology     Number of Visits Requested:   1    EKG 12 lead     Standing Status:   Future     Standing Expiration Date:   9/9/2022     Order Specific Question:   Reason for Exam?     Answer:    Other     Orders Placed This Encounter   Medications    albuterol sulfate HFA (VENTOLIN HFA) 108 (90 Base) MCG/ACT inhaler     Sig: Inhale 1 puff into the lungs every 6 hours as needed for Wheezing or Shortness of Breath     Dispense:  18 g     Refill:  5    citalopram (CELEXA) 20 MG tablet     Sig: Take 1 tablet by mouth daily     Dispense:  30 tablet     Refill:  1    famotidine (PEPCID) 20 MG tablet     Sig: Take 1 tablet by mouth 2 times daily     Dispense:  60 tablet     Refill:  2   Gi stuff (Referral put in again)   Blood in stools, abdominal pain, fevers, black tarry, bright bed, come in same day. Intermittent very random bouts of shortness of breath  Unpredictable when   No se from citalopram  No si/hi   WILL INCREASE citalopram  Next visit plan on getting off tobacco.   Understands SSRI can increase SI and to call immediately if this should occur. NON TOXIC   If anything should change or worsen call ASAP, don't wait for next scheduled appointment. Return in about 4 weeks (around 8/8/2022) for Chronic condition management/appointment, worsening symptoms, call ASAP for appointment.       Tomi Elizabeth MD

## 2022-07-13 LAB
ALLERGEN BARLEY IGE: <0.1 KU/L
ALLERGEN BEEF: <0.1 KU/L
ALLERGEN CABBAGE IGE: <0.1 KU/L
ALLERGEN CARROT IGE: <0.1 KU/L
ALLERGEN CHICKEN IGE: <0.1 KU/L
ALLERGEN CODFISH IGE: <0.1 KU/L
ALLERGEN CORN IGE: <0.1 KU/L
ALLERGEN COW MILK IGE: <0.1 KU/L
ALLERGEN CRAB IGE: <0.1 KU/L
ALLERGEN EGG WHITE IGE: <0.1 KU/L
ALLERGEN GRAPE IGE: <0.1 KU/L
ALLERGEN LETTUCE IGE: <0.1 KU/L
ALLERGEN NAVY BEAN: <0.1 KU/L
ALLERGEN OAT: <0.1 KU/L
ALLERGEN ORANGE IGE: <0.1 KU/L
ALLERGEN PEANUT (F13) IGE: <0.1 KU/L
ALLERGEN PEPPER C. ANNUUM IGE: <0.1 KU/L
ALLERGEN PORK: <0.1 KU/L
ALLERGEN RICE IGE: <0.1 KU/L
ALLERGEN RYE IGE: <0.1 KU/L
ALLERGEN SOYBEAN IGE: <0.1 KU/L
ALLERGEN TOMATO IGE: <0.1 KU/L
ALLERGEN TUNA IGE: <0.1 KU/L
ALLERGEN WHEAT IGE: <0.1 KU/L
IGE: 483 IU/ML
POTATO, IGE: <0.1 KU/L
SHRIMP: <0.1 KU/L

## 2022-07-14 DIAGNOSIS — R19.7 DIARRHEA, UNSPECIFIED TYPE: ICD-10-CM

## 2022-07-15 LAB
ADENOVIRUS F 40 41 PCR: NOT DETECTED
ASTROVIRUS PCR: NOT DETECTED
C DIFF TOXIN/ANTIGEN: NORMAL
CAMPYLOBACTER PCR: NOT DETECTED
CRYPTOSPORIDIUM PCR: NOT DETECTED
CYCLOSPORA CAYETANENSIS PCR: NOT DETECTED
E COLI ENTEROAGGREGATIVE PCR: NOT DETECTED
E COLI ENTEROPATHOGENIC PCR: NOT DETECTED
E COLI ENTEROTOXIGENIC PCR: NOT DETECTED
E COLI SHIGELLA/ENTEROINVASIVE PCR: NOT DETECTED
ENTAMOEBA HISTOLYTICA PCR: NOT DETECTED
GIARDIA ANTIGEN STOOL: NORMAL
GIARDIA LAMBLIA PCR: NOT DETECTED
NOROVIRUS GI GII PCR: NOT DETECTED
PLESIOMONAS SHIGELLOIDES PCR: NOT DETECTED
ROTAVIRUS A PCR: NOT DETECTED
SALMONELLA PCR: NOT DETECTED
SAPOVIRUS PCR: NOT DETECTED
SHIGA-LIKE TOXIN-PRODUCING E. COLI (STEC) STX1/STX2: NOT DETECTED
VIBRIO CHOLERAE PCR: NOT DETECTED
VIBRIO PCR: NOT DETECTED
YERSINIA ENTEROCOLITICA PCR: NOT DETECTED
ZINC: 87.1 UG/DL (ref 60–120)

## 2022-08-08 ENCOUNTER — OFFICE VISIT (OUTPATIENT)
Dept: FAMILY MEDICINE CLINIC | Age: 32
End: 2022-08-08
Payer: COMMERCIAL

## 2022-08-08 VITALS
WEIGHT: 247 LBS | BODY MASS INDEX: 39.7 KG/M2 | DIASTOLIC BLOOD PRESSURE: 60 MMHG | TEMPERATURE: 97.8 F | HEART RATE: 89 BPM | HEIGHT: 66 IN | SYSTOLIC BLOOD PRESSURE: 120 MMHG | OXYGEN SATURATION: 96 %

## 2022-08-08 DIAGNOSIS — Z13.1 SCREENING FOR DIABETES MELLITUS: ICD-10-CM

## 2022-08-08 DIAGNOSIS — M76.62 ACHILLES TENDINITIS OF LEFT LOWER EXTREMITY: ICD-10-CM

## 2022-08-08 DIAGNOSIS — M25.572 ACUTE LEFT ANKLE PAIN: Primary | ICD-10-CM

## 2022-08-08 DIAGNOSIS — R73.03 PREDIABETES: ICD-10-CM

## 2022-08-08 LAB — HBA1C MFR BLD: 5.8 %

## 2022-08-08 PROCEDURE — 83036 HEMOGLOBIN GLYCOSYLATED A1C: CPT

## 2022-08-08 PROCEDURE — G8427 DOCREV CUR MEDS BY ELIG CLIN: HCPCS

## 2022-08-08 PROCEDURE — 1036F TOBACCO NON-USER: CPT

## 2022-08-08 PROCEDURE — G8417 CALC BMI ABV UP PARAM F/U: HCPCS

## 2022-08-08 PROCEDURE — 99213 OFFICE O/P EST LOW 20 MIN: CPT

## 2022-08-08 RX ORDER — NAPROXEN 500 MG/1
500 TABLET ORAL 2 TIMES DAILY WITH MEALS
Qty: 60 TABLET | Refills: 0 | Status: SHIPPED | OUTPATIENT
Start: 2022-08-08

## 2022-08-08 ASSESSMENT — ENCOUNTER SYMPTOMS: COLOR CHANGE: 0

## 2022-08-08 NOTE — PROGRESS NOTES
[address] Encounter    SUBJECTIVE    CHIEF COMPLAINT:   Chief Complaint   Patient presents with    Ankle Pain     Patient states left ankle pain can hardly walk on it no injury woke up this morning like this. HPI:  Michelle Castro is a 28 y.o. male who presents to the walk-in clinic today for:   L posterior ankle pain upon wakening this AM worse with movement. Denies injury/trauma. Painful to bear weight but he is able to do so. 9/10 throbbing pain. Has not tried anything at home. He is on his feet all day as a . Reports wearing good shoes. Review of Systems:  Review of Systems   Constitutional:  Negative for chills, fatigue and fever. Musculoskeletal:  Positive for arthralgias, gait problem and joint swelling. Skin:  Negative for color change, pallor and rash.      Past Medical History:  Past Medical History:   Diagnosis Date    Anxiety     Depression     Essential hypertension 10/10/2017    Jejunal intussusception ([address]     Nonintractable migraine 10/10/2017       Family Medical History:  Family History   Problem Relation Age of Onset    High Blood Pressure Father     Mental Illness Father     Other Father         gout    Cancer Maternal Grandfather         Cancer from agent orange but he isn't sure what type    Cancer Paternal Grandmother         Breast cancer    Cancer Paternal Grandfather         LUNG SMOKER    Cancer Paternal Cousin         colon cancer    Cancer Paternal Cousin         colon cancer    Cancer Paternal Uncle         colon cancer    Cancer Paternal Great Grandparent         COLON CANCER       Social History:  Social History     Socioeconomic History    Marital status:      Spouse name: Not on file    Number of children: Not on file    Years of education: Not on file    Highest education level: Not on file   Occupational History    Not on file   Tobacco Use    Smoking status: Former     Packs/day: 0.50 Years: 5.00     Pack years: 2.50     Types: Cigarettes     Quit date: 2019     Years since quittin.6    Smokeless tobacco: Never   Vaping Use    Vaping Use: Every day    Start date: 2019    Substances: Always   Substance and Sexual Activity    Alcohol use: No    Drug use: Yes     Types: Marijuana Charmayne Stai)    Sexual activity: Not on file   Other Topics Concern    Not on file   Social History Narrative    Not on file     Social Determinants of Health     Financial Resource Strain: Low Risk     Difficulty of Paying Living Expenses: Not hard at all   Food Insecurity: No Food Insecurity    Worried About [address] in the Last Year: Never true    [address] in the Last Year: Never true   Transportation Needs: No Transportation Needs    Lack of Transportation (Medical): No    Lack of Transportation (Non-Medical): No   Physical Activity: Not on file   Stress: Not on file   Social Connections: Not on file   Intimate Partner Violence: Not on file   Housing Stability: Not on file       Allergies and medications have been reviewed. OBJECTIVE:  Vitals:  /60   Pulse 89   Temp 97.8 °F (36.6 °C) (Infrared)   Ht 5' 6\" (1.676 m)   Wt 247 lb (112 kg)   SpO2 96%   BMI 39.87 kg/m²     Physical Exam  Vitals reviewed. Constitutional:       General: He is not in acute distress. Appearance: Normal appearance. He is obese. Musculoskeletal:      Right ankle: Normal. No swelling, deformity or ecchymosis. Normal range of motion. Normal pulse. Right Achilles Tendon: Normal. No tenderness or defects. Alexis's test negative. Left ankle: Swelling present. No deformity or ecchymosis. Decreased range of motion. Normal pulse. Left Achilles Tendon: Tenderness present. No defects. Alexis's test negative. Right foot: Normal.      Left foot: Normal.        Legs:    Skin:     General: Skin is warm and dry. Capillary Refill: Capillary refill takes less than 2 seconds. Findings: No lesion or rash. Neurological:      Mental Status: He is alert. LABS:  Results for POC orders placed in visit on 08/08/22   POCT glycosylated hemoglobin (Hb A1C)   Result Value Ref Range    Hemoglobin A1C 5.8 %           ASSESSMENT/PLAN:    1. Acute left ankle pain  - XR ANKLE LEFT (MIN 3 VIEWS); Future  - naproxen (NAPROSYN) 500 MG tablet; Take 1 tablet by mouth in the morning and 1 tablet in the evening. Take with meals. Dispense: 60 tablet; Refill: 0  - [employer] Other (specify in comments); Other - ACE wrap applied to L ankle  - RICE    2. Achilles tendinitis of left lower extremity  - As above    3. Screening for diabetes mellitus  - POCT glycosylated hemoglobin (Hb A1C)    4. Prediabetes  - HbA1C 5.8%  - Discussed weight loss and lifestyle modifications  - Follow up with PCP         Return in about 4 weeks (around 9/5/2022) for reassessment with PCP. All prescribed medication today including purpose, proper use, and side effects discussed. Treatment plan/rationale and result expectations have been discussed with the patient who expresses understanding and desires to proceed. An electronic signature was used to authenticate this note.   APARNA Nunez - CNP

## 2022-08-08 NOTE — LETTER
[facility]  [address]  Phone: [phone]  Fax: [phone]    APARNA Liang CNP        August 8, 2022     Patient: Jamaal Hardy   YOB: 1990   Date of Visit: 8/8/2022       To Whom It May Concern: It is my medical opinion that Adam Bangura may return to work on 8/11/2022 with the following restrictions: avoid excessive walking or standing for 2 weeks . If you have any questions or concerns, please don't hesitate to call.     Sincerely,        APARNA Liang CNP

## 2022-08-09 PROBLEM — U07.1 COVID-19 VIRUS INFECTION: Status: RESOLVED | Noted: 2021-11-26 | Resolved: 2022-08-09

## 2022-08-09 PROBLEM — R73.03 PREDIABETES: Status: ACTIVE | Noted: 2022-08-09

## 2022-09-14 DIAGNOSIS — F32.A DEPRESSION, UNSPECIFIED DEPRESSION TYPE: ICD-10-CM

## 2022-09-15 RX ORDER — CITALOPRAM 20 MG/1
TABLET ORAL
Qty: 30 TABLET | Refills: 0 | Status: SHIPPED | OUTPATIENT
Start: 2022-09-15 | End: 2022-10-24

## 2022-10-11 ENCOUNTER — OFFICE VISIT (OUTPATIENT)
Dept: FAMILY MEDICINE CLINIC | Age: 32
End: 2022-10-11
Payer: COMMERCIAL

## 2022-10-11 VITALS
RESPIRATION RATE: 18 BRPM | BODY MASS INDEX: 39.53 KG/M2 | TEMPERATURE: 97.5 F | DIASTOLIC BLOOD PRESSURE: 70 MMHG | HEART RATE: 99 BPM | SYSTOLIC BLOOD PRESSURE: 138 MMHG | OXYGEN SATURATION: 98 % | HEIGHT: 66 IN | WEIGHT: 246 LBS

## 2022-10-11 DIAGNOSIS — J01.90 ACUTE BACTERIAL SINUSITIS: Primary | ICD-10-CM

## 2022-10-11 DIAGNOSIS — J06.9 URI WITH COUGH AND CONGESTION: ICD-10-CM

## 2022-10-11 DIAGNOSIS — B96.89 ACUTE BACTERIAL SINUSITIS: Primary | ICD-10-CM

## 2022-10-11 PROCEDURE — G8484 FLU IMMUNIZE NO ADMIN: HCPCS | Performed by: PHYSICIAN ASSISTANT

## 2022-10-11 PROCEDURE — G8427 DOCREV CUR MEDS BY ELIG CLIN: HCPCS | Performed by: PHYSICIAN ASSISTANT

## 2022-10-11 PROCEDURE — G8417 CALC BMI ABV UP PARAM F/U: HCPCS | Performed by: PHYSICIAN ASSISTANT

## 2022-10-11 PROCEDURE — 87804 INFLUENZA ASSAY W/OPTIC: CPT | Performed by: PHYSICIAN ASSISTANT

## 2022-10-11 PROCEDURE — 1036F TOBACCO NON-USER: CPT | Performed by: PHYSICIAN ASSISTANT

## 2022-10-11 PROCEDURE — 87426 SARSCOV CORONAVIRUS AG IA: CPT | Performed by: PHYSICIAN ASSISTANT

## 2022-10-11 PROCEDURE — 99213 OFFICE O/P EST LOW 20 MIN: CPT | Performed by: PHYSICIAN ASSISTANT

## 2022-10-11 RX ORDER — CLINDAMYCIN HYDROCHLORIDE 300 MG/1
300 CAPSULE ORAL 3 TIMES DAILY
Qty: 30 CAPSULE | Refills: 0 | Status: SHIPPED | OUTPATIENT
Start: 2022-10-11 | End: 2022-10-21

## 2022-10-11 SDOH — ECONOMIC STABILITY: FOOD INSECURITY: WITHIN THE PAST 12 MONTHS, YOU WORRIED THAT YOUR FOOD WOULD RUN OUT BEFORE YOU GOT MONEY TO BUY MORE.: NEVER TRUE

## 2022-10-11 SDOH — ECONOMIC STABILITY: FOOD INSECURITY: WITHIN THE PAST 12 MONTHS, THE FOOD YOU BOUGHT JUST DIDN'T LAST AND YOU DIDN'T HAVE MONEY TO GET MORE.: NEVER TRUE

## 2022-10-11 ASSESSMENT — PATIENT HEALTH QUESTIONNAIRE - PHQ9
8. MOVING OR SPEAKING SO SLOWLY THAT OTHER PEOPLE COULD HAVE NOTICED. OR THE OPPOSITE, BEING SO FIGETY OR RESTLESS THAT YOU HAVE BEEN MOVING AROUND A LOT MORE THAN USUAL: 0
4. FEELING TIRED OR HAVING LITTLE ENERGY: 0
10. IF YOU CHECKED OFF ANY PROBLEMS, HOW DIFFICULT HAVE THESE PROBLEMS MADE IT FOR YOU TO DO YOUR WORK, TAKE CARE OF THINGS AT HOME, OR GET ALONG WITH OTHER PEOPLE: 0
9. THOUGHTS THAT YOU WOULD BE BETTER OFF DEAD, OR OF HURTING YOURSELF: 0
5. POOR APPETITE OR OVEREATING: 0
SUM OF ALL RESPONSES TO PHQ9 QUESTIONS 1 & 2: 0
SUM OF ALL RESPONSES TO PHQ QUESTIONS 1-9: 0
7. TROUBLE CONCENTRATING ON THINGS, SUCH AS READING THE NEWSPAPER OR WATCHING TELEVISION: 0
SUM OF ALL RESPONSES TO PHQ QUESTIONS 1-9: 0
3. TROUBLE FALLING OR STAYING ASLEEP: 0
1. LITTLE INTEREST OR PLEASURE IN DOING THINGS: 0
6. FEELING BAD ABOUT YOURSELF - OR THAT YOU ARE A FAILURE OR HAVE LET YOURSELF OR YOUR FAMILY DOWN: 0
2. FEELING DOWN, DEPRESSED OR HOPELESS: 0

## 2022-10-11 ASSESSMENT — ENCOUNTER SYMPTOMS
TROUBLE SWALLOWING: 0
CHEST TIGHTNESS: 0
VOMITING: 0
SINUS PRESSURE: 0
BACK PAIN: 0
SHORTNESS OF BREATH: 0
ABDOMINAL PAIN: 0
COUGH: 0
DIARRHEA: 0

## 2022-10-11 ASSESSMENT — SOCIAL DETERMINANTS OF HEALTH (SDOH): HOW HARD IS IT FOR YOU TO PAY FOR THE VERY BASICS LIKE FOOD, HOUSING, MEDICAL CARE, AND HEATING?: NOT HARD AT ALL

## 2022-10-11 NOTE — PROGRESS NOTES
Subjective:      Patient ID: Gerry Aschoff is a 28 y.o. male who presents today for:  Chief Complaint   Patient presents with    Cough     Congestion, body aches, migraine and diarrhea sx started       HPI  28year old male who presents congestions, body aches, migraine, diarrhea  and cough. Has chills no fever    Past Medical History:   Diagnosis Date    Anxiety     COVID-19 virus infection 2021    Depression     Essential hypertension 10/10/2017    Jejunal intussusception ([address]     Nonintractable migraine 10/10/2017     No past surgical history on file. Social History     Socioeconomic History    Marital status:      Spouse name: Not on file    Number of children: Not on file    Years of education: Not on file    Highest education level: Not on file   Occupational History    Not on file   Tobacco Use    Smoking status: Former     Packs/day: 0.50     Years: 5.00     Pack years: 2.50     Types: Cigarettes     Quit date: 2019     Years since quittin.8    Smokeless tobacco: Never   Vaping Use    Vaping Use: Every day    Start date: 2019    Substances: Always   Substance and Sexual Activity    Alcohol use: No    Drug use: Yes     Types: Marijuana Berman Nair)    Sexual activity: Not on file   Other Topics Concern    Not on file   Social History Narrative    Not on file     Social Determinants of Health     Financial Resource Strain: Low Risk     Difficulty of Paying Living Expenses: Not hard at all   Food Insecurity: No Food Insecurity    Worried About [address] in the Last Year: Never true    [address] in the Last Year: Never true   Transportation Needs: No Transportation Needs    Lack of Transportation (Medical): No    Lack of Transportation (Non-Medical):  No   Physical Activity: Not on file   Stress: Not on file   Social Connections: Not on file   Intimate Partner Violence: Not on file   Housing Stability: Not on file     Family History   Problem Relation Age of Onset High Blood Pressure Father     Mental Illness Father     Other Father         gout    Cancer Maternal Grandfather         Cancer from agent orange but he isn't sure what type    Cancer Paternal Grandmother         Breast cancer    Cancer Paternal Grandfather         LUNG SMOKER    Cancer Paternal Cousin         colon cancer    Cancer Paternal Cousin         colon cancer    Cancer Paternal Uncle         colon cancer    Cancer Paternal Great Grandparent         COLON CANCER     Allergies   Allergen Reactions    Amoxil [Amoxicillin]      unknow to pt happens happens when he takes it    Buspar [Buspirone]      Groggy     Lexapro [Escitalopram Oxalate]      groggy    Zoloft [Sertraline Hcl]      Lost effectiveness      Current Outpatient Medications   Medication Sig Dispense Refill    clindamycin (CLEOCIN) 300 MG capsule Take 1 capsule by mouth 3 times daily for 10 days 30 capsule 0    citalopram (CELEXA) 20 MG tablet TAKE 1 TABLET BY MOUTH EVERY DAY 30 tablet 0    naproxen (NAPROSYN) 500 MG tablet Take 1 tablet by mouth in the morning and 1 tablet in the evening. Take with meals. 60 tablet 0    albuterol sulfate HFA (VENTOLIN HFA) 108 (90 Base) MCG/ACT inhaler Inhale 1 puff into the lungs every 6 hours as needed for Wheezing or Shortness of Breath 18 g 5    famotidine (PEPCID) 20 MG tablet Take 1 tablet by mouth 2 times daily 60 tablet 2    amLODIPine (NORVASC) 2.5 MG tablet Take 2.5 mg by mouth daily      fluticasone (FLONASE) 50 MCG/ACT nasal spray 2 sprays by Each Nostril route daily 16 g 0    Misc. Devices (PULSE OXIMETER FOR FINGER) MISC USE as directed 1 each 0    vitamin D (ERGOCALCIFEROL) 1.25 MG (89412 UT) CAPS capsule Take 1 capsule by mouth once a week 6 capsule 0     No current facility-administered medications for this visit. Review of Systems   Constitutional:  Negative for activity change, appetite change, chills, fever and unexpected weight change.    HENT:  Negative for drooling, ear pain, nosebleeds, sinus pressure and trouble swallowing. Respiratory:  Negative for cough, chest tightness and shortness of breath. Cardiovascular:  Negative for chest pain and leg swelling. Gastrointestinal:  Negative for abdominal pain, diarrhea and vomiting. Endocrine: Negative for polydipsia and polyphagia. Genitourinary:  Negative for dysuria, flank pain and frequency. Musculoskeletal:  Negative for back pain and myalgias. Skin:  Negative for pallor and rash. Neurological:  Negative for syncope, weakness and headaches. Hematological:  Does not bruise/bleed easily. Psychiatric/Behavioral:  Negative for agitation, behavioral problems and confusion. All other systems reviewed and are negative. Objective:   /70 (Site: Right Upper Arm, Position: Sitting, Cuff Size: Large Adult)   Pulse 99   Temp 97.5 °F (36.4 °C) (Temporal)   Resp 18   Ht 5' 6\" (1.676 m)   Wt 246 lb (111.6 kg)   SpO2 98%   BMI 39.71 kg/m²     Physical Exam  Vitals and nursing note reviewed. Constitutional:       General: He is awake. He is not in acute distress. Appearance: Normal appearance. He is well-developed and normal weight. He is not ill-appearing, toxic-appearing or diaphoretic. Comments: No photophobia. No phonophobia. HENT:      Head: Normocephalic and atraumatic. No Peterson's sign. Right Ear: External ear normal.      Left Ear: External ear normal.      Nose: Nose normal. No congestion or rhinorrhea. Mouth/Throat:      Mouth: Mucous membranes are moist.      Pharynx: Oropharynx is clear. No oropharyngeal exudate or posterior oropharyngeal erythema. Eyes:      General: No scleral icterus. Right eye: No foreign body or discharge. Left eye: No discharge. Extraocular Movements: Extraocular movements intact. Conjunctiva/sclera: Conjunctivae normal.      Left eye: No exudate. Pupils: Pupils are equal, round, and reactive to light.    Neck:      Vascular: No JVD.      Trachea: No tracheal deviation. Comments: No meningismus. Cardiovascular:      Rate and Rhythm: Normal rate and regular rhythm. Heart sounds: Normal heart sounds. Heart sounds not distant. No murmur heard. No friction rub. No gallop. Pulmonary:      Effort: Pulmonary effort is normal. No respiratory distress. Breath sounds: Normal breath sounds. No stridor. No wheezing, rhonchi or rales. Chest:      Chest wall: No tenderness. Abdominal:      General: Abdomen is flat. Bowel sounds are normal. There is no distension. Palpations: Abdomen is soft. There is no mass. Tenderness: There is no abdominal tenderness. There is no right CVA tenderness, left CVA tenderness, guarding or rebound. Hernia: No hernia is present. Musculoskeletal:         General: No swelling, tenderness, deformity or signs of injury. Normal range of motion. Cervical back: Normal range of motion and neck supple. No rigidity. Lymphadenopathy:      Head:      Right side of head: No submental adenopathy. Left side of head: No submental adenopathy. Skin:     General: Skin is warm and dry. Capillary Refill: Capillary refill takes less than 2 seconds. Coloration: Skin is not jaundiced or pale. Findings: No bruising, erythema, lesion or rash. Neurological:      General: No focal deficit present. Mental Status: He is alert and oriented to person, place, and time. Mental status is at baseline. Cranial Nerves: No cranial nerve deficit. Sensory: No sensory deficit. Motor: No weakness. Coordination: Coordination normal.      Deep Tendon Reflexes: Reflexes are normal and symmetric. Psychiatric:         Mood and Affect: Mood normal.         Behavior: Behavior normal. Behavior is cooperative. Thought Content: Thought content normal.         Judgment: Judgment normal.       Assessment:       Diagnosis Orders   1.  Acute bacterial sinusitis  clindamycin (CLEOCIN) 300 MG capsule      2. URI with cough and congestion  POCT COVID-19, Antigen    POCT Influenza A/B    Culture, Throat    clindamycin (CLEOCIN) 300 MG capsule        Results for POC orders placed in visit on 10/11/22   POCT Influenza A/B   Result Value Ref Range    Influenza A Ab neg     Influenza B Ab neg       Plan:     Assessment & Plan   Beltran Vitale was seen today for cough. Diagnoses and all orders for this visit:    Acute bacterial sinusitis  -     clindamycin (CLEOCIN) 300 MG capsule; Take 1 capsule by mouth 3 times daily for 10 days    URI with cough and congestion  -     POCT COVID-19, Antigen  -     POCT Influenza A/B  -     Culture, Throat; Future  -     clindamycin (CLEOCIN) 300 MG capsule; Take 1 capsule by mouth 3 times daily for 10 days    Orders Placed This Encounter   Procedures    Culture, Throat     Standing Status:   Future     Standing Expiration Date:   10/11/2023    POCT COVID-19, Antigen     Order Specific Question:   Is this test for diagnosis or screening? Answer:   Diagnosis of ill patient     Order Specific Question:   Symptomatic for COVID-19 as defined by CDC? Answer:   Yes     Order Specific Question:   Date of Symptom Onset     Answer:   10/9/2022     Order Specific Question:   Hospitalized for COVID-19? Answer:   No     Order Specific Question:   Admitted to ICU for COVID-19? Answer:   No     Order Specific Question:   Employed in healthcare setting? Answer:   No     Order Specific Question:   Resident in a congregate (group) care setting? Answer:   No     Order Specific Question:   Pregnant: Answer:   No     Order Specific Question:   Previously tested for COVID-19? Answer:   Yes    POCT Influenza A/B     Orders Placed This Encounter   Medications    clindamycin (CLEOCIN) 300 MG capsule     Sig: Take 1 capsule by mouth 3 times daily for 10 days     Dispense:  30 capsule     Refill:  0     There are no discontinued medications.   Return if symptoms worsen or fail to improve. Reviewed with the patient/family: current clinical status & medications. Side effects of the medication prescribed today, as well as treatment plan/rationale and result expectations have been discussed with the patient/family who expresses understanding. Patient will be discharged home in stable condition. Follow up with PCP to evaluate treatment results or return if symptoms worsen or fail to improve. Discussed signs and symptoms which require immediate follow-up in ED/call to 911. Understanding verbalized. I have reviewed the patient's medical history in detail and updated the computerized patient record.     VANE Gill

## 2022-10-14 LAB — THROAT CULTURE: NORMAL

## 2022-10-24 DIAGNOSIS — F32.A DEPRESSION, UNSPECIFIED DEPRESSION TYPE: ICD-10-CM

## 2022-10-24 RX ORDER — CITALOPRAM 20 MG/1
20 TABLET ORAL DAILY
Qty: 14 TABLET | Refills: 0 | Status: SHIPPED | OUTPATIENT
Start: 2022-10-24

## 2022-10-24 NOTE — TELEPHONE ENCOUNTER
requesting medication refill. Please approve or deny this request.    Rx requested:  Requested Prescriptions     Pending Prescriptions Disp Refills    citalopram (CELEXA) 20 MG tablet [Pharmacy Med Name: Citalopram Hydrobromide Oral Tablet 20 MG] 30 tablet 0     Sig: TAKE 1 TABLET BY MOUTH EVERY DAY         Last Office Visit:   7/11/2022      Next Visit Date:  No future appointments.

## 2022-11-16 DIAGNOSIS — F32.A DEPRESSION, UNSPECIFIED DEPRESSION TYPE: ICD-10-CM

## 2022-11-16 NOTE — TELEPHONE ENCOUNTER
Patient requesting medication refill.  Please approve or deny this request.    Rx requested:  Requested Prescriptions     Pending Prescriptions Disp Refills    amLODIPine (NORVASC) 2.5 MG tablet 30 tablet      Sig: Take 1 tablet by mouth daily    citalopram (CELEXA) 20 MG tablet 30 tablet 0       Next Visit Date:  Future Appointments   Date Time Provider Crispin Sanders   11/22/2022 11:00 AM Luisa Hoffman, [address]

## 2022-11-17 RX ORDER — CITALOPRAM 20 MG/1
TABLET ORAL
Qty: 30 TABLET | Refills: 0 | Status: SHIPPED | OUTPATIENT
Start: 2022-11-17

## 2022-11-17 RX ORDER — AMLODIPINE BESYLATE 2.5 MG/1
2.5 TABLET ORAL DAILY
Qty: 30 TABLET | Refills: 0 | Status: SHIPPED | OUTPATIENT
Start: 2022-11-17

## 2022-12-27 RX ORDER — AMLODIPINE BESYLATE 2.5 MG/1
TABLET ORAL
Qty: 30 TABLET | Refills: 0 | OUTPATIENT
Start: 2022-12-27

## 2023-04-06 RX ORDER — AMLODIPINE BESYLATE 2.5 MG/1
TABLET ORAL
Qty: 30 TABLET | Refills: 0 | OUTPATIENT
Start: 2023-04-06

## 2023-04-18 NOTE — PROGRESS NOTES
.Patient is seen in follow up for   Chief Complaint   Patient presents with    Anxiety     3 mon f/u-getting worse taking zoloft     Hypertension     f/u on verapamil     Hypertension   This is a chronic problem. The current episode started more than 1 year ago. The problem is unchanged. The problem is controlled. Pertinent negatives include no chest pain, palpitations or shortness of breath. There are no associated agents to hypertension. There are no known risk factors for coronary artery disease. Past treatments include calcium channel blockers. The current treatment provides moderate improvement. There are no compliance problems. Past Medical History:   Diagnosis Date    Depression     Essential hypertension 10/10/2017    Jejunal intussusception ([address]     Nonintractable migraine 10/10/2017     Patient Active Problem List    Diagnosis Date Noted    Essential hypertension 10/10/2017    Nonintractable migraine 10/10/2017    Anxiety 07/17/2017     History reviewed. No pertinent surgical history. Family History   Problem Relation Age of Onset    High Blood Pressure Father     Mental Illness Father     Other Father      gout     Social History     Social History    Marital status: Single     Spouse name: N/A    Number of children: N/A    Years of education: N/A     Social History Main Topics    Smoking status: Current Every Day Smoker     Packs/day: 0.50     Years: 5.00    Smokeless tobacco: Never Used    Alcohol use No    Drug use: Unknown    Sexual activity: Not Asked     Other Topics Concern    None     Social History Narrative    None     Current Outpatient Prescriptions   Medication Sig Dispense Refill    ALPRAZolam (XANAX) 0.25 MG tablet Take 1 tablet by mouth 3 times daily as needed for Anxiety .  60 tablet 1    verapamil (CALAN SR) 180 MG extended release tablet Take 1 tablet by mouth daily 30 tablet 3    sertraline (ZOLOFT) 100 MG tablet Take 2 tablets by mouth daily 180 tablet [Well Developed] : well developed [Well Nourished] : well nourished [No Acute Distress] : no acute distress [Normal Conjunctiva] : normal conjunctiva [Normal Venous Pressure] : normal venous pressure [No Carotid Bruit] : no carotid bruit [Normal S1, S2] : normal S1, S2 [No Murmur] : no murmur [No Rub] : no rub [No Gallop] : no gallop [Clear Lung Fields] : clear lung fields [Good Air Entry] : good air entry [No Respiratory Distress] : no respiratory distress  [Soft] : abdomen soft [Non Tender] : non-tender [No Masses/organomegaly] : no masses/organomegaly [Normal Bowel Sounds] : normal bowel sounds [Normal Gait] : normal gait [No Edema] : no edema [No Cyanosis] : no cyanosis [No Clubbing] : no clubbing [Normal DP B/L] : normal DP B/L [Diminished Pedal Pulses ___] : diminished pedal pulses [unfilled] [No Rash] : no rash [Moves all extremities] : moves all extremities [No Focal Deficits] : no focal deficits [Normal Speech] : normal speech [Alert and Oriented] : alert and oriented [Normal memory] : normal memory

## 2023-11-14 ENCOUNTER — HOSPITAL ENCOUNTER (EMERGENCY)
Age: 33
Discharge: HOME | End: 2023-11-14
Payer: COMMERCIAL

## 2023-11-14 VITALS
RESPIRATION RATE: 20 BRPM | OXYGEN SATURATION: 98 % | HEART RATE: 87 BPM | TEMPERATURE: 97.3 F | DIASTOLIC BLOOD PRESSURE: 108 MMHG | SYSTOLIC BLOOD PRESSURE: 176 MMHG

## 2023-11-14 DIAGNOSIS — K08.89 DENTALGIA: Primary | ICD-10-CM

## 2023-11-14 PROCEDURE — 99283 EMERGENCY DEPT VISIT LOW MDM: CPT

## 2023-11-14 PROCEDURE — 6370000000 HC RX 637 (ALT 250 FOR IP): Performed by: PERSONAL EMERGENCY RESPONSE ATTENDANT

## 2023-11-14 RX ORDER — IBUPROFEN 800 MG/1
800 TABLET ORAL ONCE
Status: COMPLETED | OUTPATIENT
Start: 2023-11-14 | End: 2023-11-14

## 2023-11-14 RX ORDER — CLINDAMYCIN HYDROCHLORIDE 300 MG/1
300 CAPSULE ORAL 3 TIMES DAILY
Qty: 21 CAPSULE | Refills: 0 | Status: SHIPPED | OUTPATIENT
Start: 2023-11-14 | End: 2023-11-21

## 2023-11-14 RX ORDER — HYDROCODONE BITARTRATE AND ACETAMINOPHEN 5; 325 MG/1; MG/1
1 TABLET ORAL ONCE
Status: COMPLETED | OUTPATIENT
Start: 2023-11-14 | End: 2023-11-14

## 2023-11-14 RX ORDER — HYDROCODONE BITARTRATE AND ACETAMINOPHEN 5; 325 MG/1; MG/1
1 TABLET ORAL EVERY 6 HOURS PRN
Qty: 10 TABLET | Refills: 0 | Status: SHIPPED | OUTPATIENT
Start: 2023-11-14 | End: 2023-11-17

## 2023-11-14 RX ADMIN — HYDROCODONE BITARTRATE AND ACETAMINOPHEN 1 TABLET: 5; 325 TABLET ORAL at 02:32

## 2023-11-14 RX ADMIN — IBUPROFEN 800 MG: 800 TABLET, FILM COATED ORAL at 02:33

## 2023-11-14 ASSESSMENT — ENCOUNTER SYMPTOMS
COLOR CHANGE: 0
ABDOMINAL PAIN: 0
DIARRHEA: 0
VOMITING: 0
SORE THROAT: 0
NAUSEA: 0
COUGH: 0
SHORTNESS OF BREATH: 0
BLOOD IN STOOL: 0
RHINORRHEA: 0

## 2023-11-14 ASSESSMENT — PAIN DESCRIPTION - PAIN TYPE: TYPE: ACUTE PAIN

## 2023-11-14 ASSESSMENT — PAIN DESCRIPTION - LOCATION
LOCATION: TEETH
LOCATION: TEETH

## 2023-11-14 ASSESSMENT — PAIN SCALES - GENERAL
PAINLEVEL_OUTOF10: 10
PAINLEVEL_OUTOF10: 10

## 2023-11-14 ASSESSMENT — PAIN - FUNCTIONAL ASSESSMENT: PAIN_FUNCTIONAL_ASSESSMENT: 0-10

## 2023-11-14 NOTE — ED NOTES
Pt provided discharge instructions, verbalizes understanding. Respirations even and unlabored, NAD noted. Pt ambulatory to the lobby following discharge, gait steady and even. No further needs expressed.        James Shearer RN  11/14/23 [id number]

## 2023-11-14 NOTE — ED PROVIDER NOTES
[facility] ED  eMERGENCY dEPARTMENT eNCOUnter      Pt Name: La Garcia  MRN: [id number]  [address] [facility] 1990  Date of evaluation: 11/14/2023  Provider: VANE Nguyen    My attending is Dr. Layne Kelley is a 35 y.o. male with PMHx of anxiety, depression, hypertension, jejunal intussusception, migraine, Monoallelic mutation of ALPL gene presents to the emergency department with dental pain. Patient states that he started with right lower dental pain. 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 He does not have a dentist.  He has been taking Tylenol, Orajel, and at home remedies at home without relief. No fevers or chills. HPI    Nursing Notes were reviewed. REVIEW OF SYSTEMS       Review of Systems   Constitutional:  Negative for appetite change, chills and fever. HENT:  Positive for dental problem. Negative for congestion, rhinorrhea and sore throat. Respiratory:  Negative for cough and shortness of breath. Cardiovascular:  Negative for chest pain. Gastrointestinal:  Negative for abdominal pain, blood in stool, diarrhea, nausea and vomiting. Genitourinary:  Negative for difficulty urinating. Musculoskeletal:  Negative for neck stiffness. Skin:  Negative for color change and rash. Neurological:  Negative for dizziness, syncope, weakness, light-headedness, numbness and headaches. All other systems reviewed and are negative. PAST MEDICAL HISTORY     Past Medical History:   Diagnosis Date    Anxiety     COVID-19 virus infection 11/26/2021    Depression     Essential hypertension 10/10/2017    Jejunal intussusception ([address])     Monoallelic mutation of ALPL gene     Nonintractable migraine 10/10/2017         SURGICAL HISTORY     History reviewed. No pertinent surgical history.       CURRENT MEDICATIONS       Previous Medications    ALBUTEROL SULFATE HFA (VENTOLIN HFA) 108 (90

## 2025-05-13 ENCOUNTER — APPOINTMENT (OUTPATIENT)
Dept: URGENT CARE | Age: 35
End: 2025-05-13